# Patient Record
Sex: FEMALE | Race: ASIAN | NOT HISPANIC OR LATINO | Employment: PART TIME | URBAN - METROPOLITAN AREA
[De-identification: names, ages, dates, MRNs, and addresses within clinical notes are randomized per-mention and may not be internally consistent; named-entity substitution may affect disease eponyms.]

---

## 2017-01-20 ENCOUNTER — OFFICE VISIT - HEALTHEAST (OUTPATIENT)
Dept: FAMILY MEDICINE | Facility: CLINIC | Age: 37
End: 2017-01-20

## 2017-01-20 DIAGNOSIS — G47.9 TROUBLE IN SLEEPING: ICD-10-CM

## 2017-01-26 ENCOUNTER — COMMUNICATION - HEALTHEAST (OUTPATIENT)
Dept: FAMILY MEDICINE | Facility: CLINIC | Age: 37
End: 2017-01-26

## 2017-02-13 ENCOUNTER — AMBULATORY - HEALTHEAST (OUTPATIENT)
Dept: FAMILY MEDICINE | Facility: CLINIC | Age: 37
End: 2017-02-13

## 2017-02-22 ENCOUNTER — OFFICE VISIT - HEALTHEAST (OUTPATIENT)
Dept: FAMILY MEDICINE | Facility: CLINIC | Age: 37
End: 2017-02-22

## 2017-02-22 DIAGNOSIS — F32.3 SEVERE MAJOR DEPRESSION WITH PSYCHOTIC FEATURES (H): ICD-10-CM

## 2017-02-22 DIAGNOSIS — B19.10 HEPATITIS B: ICD-10-CM

## 2017-02-22 DIAGNOSIS — Z00.00 ROUTINE GENERAL MEDICAL EXAMINATION AT A HEALTH CARE FACILITY: ICD-10-CM

## 2017-02-22 LAB
AFP SERPL-MCNC: 2.6 UG/ML
CHOLEST SERPL-MCNC: 208 MG/DL
FASTING STATUS PATIENT QL REPORTED: YES
HDLC SERPL-MCNC: 37 MG/DL
LDLC SERPL CALC-MCNC: 144 MG/DL
TRIGL SERPL-MCNC: 137 MG/DL

## 2017-02-22 ASSESSMENT — MIFFLIN-ST. JEOR: SCORE: 1173.45

## 2017-02-23 LAB — HBE ANTIBODY, S - HISTORICAL: NEGATIVE

## 2017-02-24 LAB
HAV IGM SERPL QL IA: NEGATIVE
HBV CORE IGM SERPL QL IA: NEGATIVE
HBV SURFACE AG SERPL QL IA: ABNORMAL
HCV AB SERPL QL IA: NEGATIVE

## 2017-02-27 LAB — HBV DNA DETECT/QUANT, S: DETECTED IU/ML

## 2017-03-14 ENCOUNTER — OFFICE VISIT - HEALTHEAST (OUTPATIENT)
Dept: FAMILY MEDICINE | Facility: CLINIC | Age: 37
End: 2017-03-14

## 2017-03-14 DIAGNOSIS — J06.9 URI (UPPER RESPIRATORY INFECTION): ICD-10-CM

## 2017-03-14 DIAGNOSIS — F32.3 SEVERE MAJOR DEPRESSION WITH PSYCHOTIC FEATURES (H): ICD-10-CM

## 2017-03-14 DIAGNOSIS — E78.5 HYPERLIPIDEMIA: ICD-10-CM

## 2017-03-14 DIAGNOSIS — E55.9 VITAMIN D DEFICIENCY: ICD-10-CM

## 2017-03-14 DIAGNOSIS — B19.10 HEPATITIS B: ICD-10-CM

## 2017-03-16 ENCOUNTER — OFFICE VISIT - HEALTHEAST (OUTPATIENT)
Dept: BEHAVIORAL HEALTH | Facility: CLINIC | Age: 37
End: 2017-03-16

## 2017-03-16 DIAGNOSIS — F43.10 PTSD (POST-TRAUMATIC STRESS DISORDER): ICD-10-CM

## 2017-03-16 DIAGNOSIS — F32.A DEPRESSION, UNSPECIFIED DEPRESSION TYPE: ICD-10-CM

## 2017-03-30 ENCOUNTER — OFFICE VISIT - HEALTHEAST (OUTPATIENT)
Dept: BEHAVIORAL HEALTH | Facility: CLINIC | Age: 37
End: 2017-03-30

## 2017-03-30 DIAGNOSIS — F32.A DEPRESSION, UNSPECIFIED DEPRESSION TYPE: ICD-10-CM

## 2017-04-17 ENCOUNTER — RECORDS - HEALTHEAST (OUTPATIENT)
Dept: ADMINISTRATIVE | Facility: OTHER | Age: 37
End: 2017-04-17

## 2017-04-27 ENCOUNTER — OFFICE VISIT - HEALTHEAST (OUTPATIENT)
Dept: BEHAVIORAL HEALTH | Facility: CLINIC | Age: 37
End: 2017-04-27

## 2017-04-27 DIAGNOSIS — F43.10 PTSD (POST-TRAUMATIC STRESS DISORDER): ICD-10-CM

## 2017-04-27 DIAGNOSIS — F32.A DEPRESSION, UNSPECIFIED DEPRESSION TYPE: ICD-10-CM

## 2017-06-01 ENCOUNTER — OFFICE VISIT - HEALTHEAST (OUTPATIENT)
Dept: BEHAVIORAL HEALTH | Facility: CLINIC | Age: 37
End: 2017-06-01

## 2017-06-01 ENCOUNTER — OFFICE VISIT - HEALTHEAST (OUTPATIENT)
Dept: FAMILY MEDICINE | Facility: CLINIC | Age: 37
End: 2017-06-01

## 2017-06-01 DIAGNOSIS — F43.10 PTSD (POST-TRAUMATIC STRESS DISORDER): ICD-10-CM

## 2017-06-01 DIAGNOSIS — F32.A DEPRESSION, UNSPECIFIED DEPRESSION TYPE: ICD-10-CM

## 2017-06-01 DIAGNOSIS — R68.89 COLD INTOLERANCE: ICD-10-CM

## 2017-06-15 ENCOUNTER — OFFICE VISIT - HEALTHEAST (OUTPATIENT)
Dept: BEHAVIORAL HEALTH | Facility: CLINIC | Age: 37
End: 2017-06-15

## 2017-06-15 DIAGNOSIS — F33.3 SEVERE EPISODE OF RECURRENT MAJOR DEPRESSIVE DISORDER, WITH PSYCHOTIC FEATURES (H): ICD-10-CM

## 2017-06-15 DIAGNOSIS — F43.10 PTSD (POST-TRAUMATIC STRESS DISORDER): ICD-10-CM

## 2017-06-29 ENCOUNTER — OFFICE VISIT - HEALTHEAST (OUTPATIENT)
Dept: BEHAVIORAL HEALTH | Facility: CLINIC | Age: 37
End: 2017-06-29

## 2017-06-29 DIAGNOSIS — F33.3 SEVERE EPISODE OF RECURRENT MAJOR DEPRESSIVE DISORDER, WITH PSYCHOTIC FEATURES (H): ICD-10-CM

## 2017-06-29 DIAGNOSIS — F43.10 PTSD (POST-TRAUMATIC STRESS DISORDER): ICD-10-CM

## 2017-07-20 ENCOUNTER — OFFICE VISIT - HEALTHEAST (OUTPATIENT)
Dept: BEHAVIORAL HEALTH | Facility: CLINIC | Age: 37
End: 2017-07-20

## 2017-07-20 DIAGNOSIS — F43.10 PTSD (POST-TRAUMATIC STRESS DISORDER): ICD-10-CM

## 2017-07-20 DIAGNOSIS — F33.3 SEVERE EPISODE OF RECURRENT MAJOR DEPRESSIVE DISORDER, WITH PSYCHOTIC FEATURES (H): ICD-10-CM

## 2017-08-29 ENCOUNTER — COMMUNICATION - HEALTHEAST (OUTPATIENT)
Dept: FAMILY MEDICINE | Facility: CLINIC | Age: 37
End: 2017-08-29

## 2017-08-29 ENCOUNTER — OFFICE VISIT - HEALTHEAST (OUTPATIENT)
Dept: FAMILY MEDICINE | Facility: CLINIC | Age: 37
End: 2017-08-29

## 2017-08-29 DIAGNOSIS — F32.3 SEVERE MAJOR DEPRESSION WITH PSYCHOTIC FEATURES (H): ICD-10-CM

## 2017-08-29 DIAGNOSIS — B19.10 HEPATITIS B: ICD-10-CM

## 2017-08-29 DIAGNOSIS — N76.0 VAGINITIS: ICD-10-CM

## 2017-08-30 LAB
AFP SERPL-MCNC: 2.5 UG/ML
HBV SURFACE AB SERPL IA-ACNC: NEGATIVE M[IU]/ML
HBV SURFACE AG SERPL QL IA: ABNORMAL

## 2017-08-31 LAB
HBE ANTIBODY, S - HISTORICAL: POSITIVE
HBV DNA DETECT/QUANT, S: 21 IU/ML
HEPATITIS BE AG, S - HISTORICAL: NEGATIVE

## 2017-09-01 LAB — HCV RNA DETECT/QUANT, S - HISTORICAL: NORMAL IU/ML

## 2017-09-28 ENCOUNTER — OFFICE VISIT - HEALTHEAST (OUTPATIENT)
Dept: FAMILY MEDICINE | Facility: CLINIC | Age: 37
End: 2017-09-28

## 2017-09-28 DIAGNOSIS — G47.9 TROUBLE IN SLEEPING: ICD-10-CM

## 2017-09-28 DIAGNOSIS — Z23 NEED FOR PROPHYLACTIC VACCINATION AND INOCULATION AGAINST INFLUENZA: ICD-10-CM

## 2017-09-28 DIAGNOSIS — R10.11 RUQ ABDOMINAL PAIN: ICD-10-CM

## 2017-09-28 DIAGNOSIS — B19.10 HEPATITIS B: ICD-10-CM

## 2017-09-28 DIAGNOSIS — F32.3 SEVERE MAJOR DEPRESSION WITH PSYCHOTIC FEATURES (H): ICD-10-CM

## 2017-10-02 ENCOUNTER — HOSPITAL ENCOUNTER (OUTPATIENT)
Dept: ULTRASOUND IMAGING | Facility: HOSPITAL | Age: 37
Discharge: HOME | End: 2017-10-02
Attending: FAMILY MEDICINE

## 2017-10-02 ENCOUNTER — COMMUNICATION - HEALTHEAST (OUTPATIENT)
Dept: FAMILY MEDICINE | Facility: CLINIC | Age: 37
End: 2017-10-02

## 2017-10-02 DIAGNOSIS — F32.9 MAJOR DEPRESSION: ICD-10-CM

## 2017-10-02 DIAGNOSIS — R10.11 RUQ ABDOMINAL PAIN: ICD-10-CM

## 2017-10-02 DIAGNOSIS — B19.10 HEPATITIS B: ICD-10-CM

## 2017-10-04 ENCOUNTER — COMMUNICATION - HEALTHEAST (OUTPATIENT)
Dept: FAMILY MEDICINE | Facility: CLINIC | Age: 37
End: 2017-10-04

## 2017-10-04 DIAGNOSIS — R74.8 ELEVATED LIPASE: ICD-10-CM

## 2017-10-05 ENCOUNTER — AMBULATORY - HEALTHEAST (OUTPATIENT)
Dept: LAB | Facility: CLINIC | Age: 37
End: 2017-10-05

## 2017-10-05 DIAGNOSIS — R74.8 ELEVATED LIPASE: ICD-10-CM

## 2017-10-06 ENCOUNTER — COMMUNICATION - HEALTHEAST (OUTPATIENT)
Dept: FAMILY MEDICINE | Facility: CLINIC | Age: 37
End: 2017-10-06

## 2017-10-19 ENCOUNTER — OFFICE VISIT - HEALTHEAST (OUTPATIENT)
Dept: FAMILY MEDICINE | Facility: CLINIC | Age: 37
End: 2017-10-19

## 2017-10-19 DIAGNOSIS — R74.8 ELEVATED LIPASE: ICD-10-CM

## 2017-10-19 DIAGNOSIS — R10.11 RUQ ABDOMINAL PAIN: ICD-10-CM

## 2017-10-20 ENCOUNTER — HOSPITAL ENCOUNTER (OUTPATIENT)
Dept: CT IMAGING | Facility: HOSPITAL | Age: 37
Discharge: HOME | End: 2017-10-20
Attending: FAMILY MEDICINE

## 2017-10-20 DIAGNOSIS — R74.8 ELEVATED LIPASE: ICD-10-CM

## 2017-11-01 ENCOUNTER — OFFICE VISIT - HEALTHEAST (OUTPATIENT)
Dept: FAMILY MEDICINE | Facility: CLINIC | Age: 37
End: 2017-11-01

## 2017-11-01 DIAGNOSIS — B19.10 HEPATITIS B: ICD-10-CM

## 2017-11-01 DIAGNOSIS — R74.8 ELEVATED LIPASE: ICD-10-CM

## 2017-11-01 DIAGNOSIS — F32.3 SEVERE MAJOR DEPRESSION WITH PSYCHOTIC FEATURES (H): ICD-10-CM

## 2017-11-07 ENCOUNTER — OFFICE VISIT - HEALTHEAST (OUTPATIENT)
Dept: BEHAVIORAL HEALTH | Facility: CLINIC | Age: 37
End: 2017-11-07

## 2017-11-07 DIAGNOSIS — F43.10 PTSD (POST-TRAUMATIC STRESS DISORDER): ICD-10-CM

## 2017-11-07 DIAGNOSIS — F33.3 SEVERE EPISODE OF RECURRENT MAJOR DEPRESSIVE DISORDER, WITH PSYCHOTIC FEATURES (H): ICD-10-CM

## 2017-11-09 ENCOUNTER — AMBULATORY - HEALTHEAST (OUTPATIENT)
Dept: FAMILY MEDICINE | Facility: CLINIC | Age: 37
End: 2017-11-09

## 2017-11-09 DIAGNOSIS — F39 MOOD DISORDER (H): ICD-10-CM

## 2017-11-14 ENCOUNTER — OFFICE VISIT - HEALTHEAST (OUTPATIENT)
Dept: BEHAVIORAL HEALTH | Facility: CLINIC | Age: 37
End: 2017-11-14

## 2017-11-14 DIAGNOSIS — F33.3 SEVERE EPISODE OF RECURRENT MAJOR DEPRESSIVE DISORDER, WITH PSYCHOTIC FEATURES (H): ICD-10-CM

## 2017-11-14 DIAGNOSIS — F43.10 PTSD (POST-TRAUMATIC STRESS DISORDER): ICD-10-CM

## 2017-11-28 ENCOUNTER — AMBULATORY - HEALTHEAST (OUTPATIENT)
Dept: BEHAVIORAL HEALTH | Facility: CLINIC | Age: 37
End: 2017-11-28

## 2017-11-28 ENCOUNTER — OFFICE VISIT - HEALTHEAST (OUTPATIENT)
Dept: BEHAVIORAL HEALTH | Facility: CLINIC | Age: 37
End: 2017-11-28

## 2017-11-28 DIAGNOSIS — F33.3 SEVERE EPISODE OF RECURRENT MAJOR DEPRESSIVE DISORDER, WITH PSYCHOTIC FEATURES (H): ICD-10-CM

## 2017-11-28 DIAGNOSIS — F43.10 PTSD (POST-TRAUMATIC STRESS DISORDER): ICD-10-CM

## 2017-12-12 ENCOUNTER — OFFICE VISIT - HEALTHEAST (OUTPATIENT)
Dept: BEHAVIORAL HEALTH | Facility: CLINIC | Age: 37
End: 2017-12-12

## 2017-12-12 DIAGNOSIS — F43.10 PTSD (POST-TRAUMATIC STRESS DISORDER): ICD-10-CM

## 2017-12-12 DIAGNOSIS — F33.3 SEVERE EPISODE OF RECURRENT MAJOR DEPRESSIVE DISORDER, WITH PSYCHOTIC FEATURES (H): ICD-10-CM

## 2017-12-14 ENCOUNTER — OFFICE VISIT - HEALTHEAST (OUTPATIENT)
Dept: BEHAVIORAL HEALTH | Facility: CLINIC | Age: 37
End: 2017-12-14

## 2017-12-14 DIAGNOSIS — F33.3 MDD (MAJOR DEPRESSIVE DISORDER), RECURRENT, SEVERE, WITH PSYCHOSIS (H): ICD-10-CM

## 2017-12-14 ASSESSMENT — MIFFLIN-ST. JEOR: SCORE: 1109.94

## 2017-12-26 ENCOUNTER — OFFICE VISIT - HEALTHEAST (OUTPATIENT)
Dept: FAMILY MEDICINE | Facility: CLINIC | Age: 37
End: 2017-12-26

## 2017-12-26 ENCOUNTER — OFFICE VISIT - HEALTHEAST (OUTPATIENT)
Dept: BEHAVIORAL HEALTH | Facility: CLINIC | Age: 37
End: 2017-12-26

## 2017-12-26 DIAGNOSIS — F43.10 PTSD (POST-TRAUMATIC STRESS DISORDER): ICD-10-CM

## 2017-12-26 DIAGNOSIS — N76.0 VAGINITIS: ICD-10-CM

## 2017-12-26 DIAGNOSIS — F33.3 SEVERE EPISODE OF RECURRENT MAJOR DEPRESSIVE DISORDER, WITH PSYCHOTIC FEATURES (H): ICD-10-CM

## 2018-01-16 ENCOUNTER — OFFICE VISIT - HEALTHEAST (OUTPATIENT)
Dept: BEHAVIORAL HEALTH | Facility: CLINIC | Age: 38
End: 2018-01-16

## 2018-01-16 DIAGNOSIS — F33.3 SEVERE EPISODE OF RECURRENT MAJOR DEPRESSIVE DISORDER, WITH PSYCHOTIC FEATURES (H): ICD-10-CM

## 2018-01-16 DIAGNOSIS — F43.10 PTSD (POST-TRAUMATIC STRESS DISORDER): ICD-10-CM

## 2018-01-19 ENCOUNTER — COMMUNICATION - HEALTHEAST (OUTPATIENT)
Dept: BEHAVIORAL HEALTH | Facility: CLINIC | Age: 38
End: 2018-01-19

## 2018-01-24 ENCOUNTER — OFFICE VISIT - HEALTHEAST (OUTPATIENT)
Dept: BEHAVIORAL HEALTH | Facility: CLINIC | Age: 38
End: 2018-01-24

## 2018-01-24 DIAGNOSIS — F33.3 MDD (MAJOR DEPRESSIVE DISORDER), RECURRENT, SEVERE, WITH PSYCHOSIS (H): ICD-10-CM

## 2018-01-24 DIAGNOSIS — F32.9 MAJOR DEPRESSION: ICD-10-CM

## 2018-01-24 ASSESSMENT — MIFFLIN-ST. JEOR: SCORE: 1123.55

## 2018-01-29 ENCOUNTER — OFFICE VISIT - HEALTHEAST (OUTPATIENT)
Dept: BEHAVIORAL HEALTH | Facility: CLINIC | Age: 38
End: 2018-01-29

## 2018-01-29 DIAGNOSIS — F33.3 SEVERE EPISODE OF RECURRENT MAJOR DEPRESSIVE DISORDER, WITH PSYCHOTIC FEATURES (H): ICD-10-CM

## 2018-01-29 DIAGNOSIS — F43.10 PTSD (POST-TRAUMATIC STRESS DISORDER): ICD-10-CM

## 2018-02-12 ENCOUNTER — OFFICE VISIT - HEALTHEAST (OUTPATIENT)
Dept: BEHAVIORAL HEALTH | Facility: CLINIC | Age: 38
End: 2018-02-12

## 2018-02-12 DIAGNOSIS — F33.3 SEVERE EPISODE OF RECURRENT MAJOR DEPRESSIVE DISORDER, WITH PSYCHOTIC FEATURES (H): ICD-10-CM

## 2018-02-12 DIAGNOSIS — F43.10 PTSD (POST-TRAUMATIC STRESS DISORDER): ICD-10-CM

## 2018-02-26 ENCOUNTER — AMBULATORY - HEALTHEAST (OUTPATIENT)
Dept: BEHAVIORAL HEALTH | Facility: CLINIC | Age: 38
End: 2018-02-26

## 2018-02-26 ENCOUNTER — OFFICE VISIT - HEALTHEAST (OUTPATIENT)
Dept: BEHAVIORAL HEALTH | Facility: CLINIC | Age: 38
End: 2018-02-26

## 2018-02-26 DIAGNOSIS — F33.3 SEVERE EPISODE OF RECURRENT MAJOR DEPRESSIVE DISORDER, WITH PSYCHOTIC FEATURES (H): ICD-10-CM

## 2018-02-26 DIAGNOSIS — F43.10 PTSD (POST-TRAUMATIC STRESS DISORDER): ICD-10-CM

## 2018-02-26 DIAGNOSIS — F45.1 SOMATIC SYMPTOM DISORDER: ICD-10-CM

## 2018-03-06 ENCOUNTER — AMBULATORY - HEALTHEAST (OUTPATIENT)
Dept: BEHAVIORAL HEALTH | Facility: CLINIC | Age: 38
End: 2018-03-06

## 2018-03-08 ENCOUNTER — OFFICE VISIT - HEALTHEAST (OUTPATIENT)
Dept: BEHAVIORAL HEALTH | Facility: CLINIC | Age: 38
End: 2018-03-08

## 2018-03-08 DIAGNOSIS — F33.3 MDD (MAJOR DEPRESSIVE DISORDER), RECURRENT, SEVERE, WITH PSYCHOSIS (H): ICD-10-CM

## 2018-03-08 ASSESSMENT — MIFFLIN-ST. JEOR: SCORE: 1132.62

## 2018-03-12 ENCOUNTER — OFFICE VISIT - HEALTHEAST (OUTPATIENT)
Dept: BEHAVIORAL HEALTH | Facility: CLINIC | Age: 38
End: 2018-03-12

## 2018-03-12 DIAGNOSIS — F33.3 SEVERE EPISODE OF RECURRENT MAJOR DEPRESSIVE DISORDER, WITH PSYCHOTIC FEATURES (H): ICD-10-CM

## 2018-03-12 DIAGNOSIS — F43.10 PTSD (POST-TRAUMATIC STRESS DISORDER): ICD-10-CM

## 2018-03-26 ENCOUNTER — OFFICE VISIT - HEALTHEAST (OUTPATIENT)
Dept: BEHAVIORAL HEALTH | Facility: CLINIC | Age: 38
End: 2018-03-26

## 2018-03-26 DIAGNOSIS — F33.3 SEVERE EPISODE OF RECURRENT MAJOR DEPRESSIVE DISORDER, WITH PSYCHOTIC FEATURES (H): ICD-10-CM

## 2018-03-26 DIAGNOSIS — F43.10 PTSD (POST-TRAUMATIC STRESS DISORDER): ICD-10-CM

## 2018-04-09 ENCOUNTER — OFFICE VISIT - HEALTHEAST (OUTPATIENT)
Dept: BEHAVIORAL HEALTH | Facility: CLINIC | Age: 38
End: 2018-04-09

## 2018-04-09 DIAGNOSIS — F33.3 SEVERE EPISODE OF RECURRENT MAJOR DEPRESSIVE DISORDER, WITH PSYCHOTIC FEATURES (H): ICD-10-CM

## 2018-04-09 DIAGNOSIS — F43.10 PTSD (POST-TRAUMATIC STRESS DISORDER): ICD-10-CM

## 2018-04-23 ENCOUNTER — OFFICE VISIT - HEALTHEAST (OUTPATIENT)
Dept: BEHAVIORAL HEALTH | Facility: CLINIC | Age: 38
End: 2018-04-23

## 2018-04-23 DIAGNOSIS — F43.10 PTSD (POST-TRAUMATIC STRESS DISORDER): ICD-10-CM

## 2018-04-23 DIAGNOSIS — F33.3 SEVERE EPISODE OF RECURRENT MAJOR DEPRESSIVE DISORDER, WITH PSYCHOTIC FEATURES (H): ICD-10-CM

## 2018-05-07 ENCOUNTER — OFFICE VISIT - HEALTHEAST (OUTPATIENT)
Dept: BEHAVIORAL HEALTH | Facility: CLINIC | Age: 38
End: 2018-05-07

## 2018-05-07 ENCOUNTER — AMBULATORY - HEALTHEAST (OUTPATIENT)
Dept: BEHAVIORAL HEALTH | Facility: CLINIC | Age: 38
End: 2018-05-07

## 2018-05-07 DIAGNOSIS — F43.10 PTSD (POST-TRAUMATIC STRESS DISORDER): ICD-10-CM

## 2018-05-07 DIAGNOSIS — F33.3 SEVERE EPISODE OF RECURRENT MAJOR DEPRESSIVE DISORDER, WITH PSYCHOTIC FEATURES (H): ICD-10-CM

## 2018-05-29 ENCOUNTER — OFFICE VISIT - HEALTHEAST (OUTPATIENT)
Dept: FAMILY MEDICINE | Facility: CLINIC | Age: 38
End: 2018-05-29

## 2018-05-29 DIAGNOSIS — R10.84 GENERALIZED ABDOMINAL PAIN: ICD-10-CM

## 2018-05-29 DIAGNOSIS — R50.9 FEVER: ICD-10-CM

## 2018-05-29 DIAGNOSIS — N39.0 UTI (URINARY TRACT INFECTION): ICD-10-CM

## 2018-05-29 LAB
ALBUMIN UR-MCNC: ABNORMAL MG/DL
APPEARANCE UR: CLEAR
BACTERIA #/AREA URNS HPF: ABNORMAL HPF
BASOPHILS # BLD AUTO: 0.1 THOU/UL (ref 0–0.2)
BASOPHILS NFR BLD AUTO: 1 % (ref 0–2)
BILIRUB UR QL STRIP: ABNORMAL
COLOR UR AUTO: YELLOW
EOSINOPHIL # BLD AUTO: 0.1 THOU/UL (ref 0–0.4)
EOSINOPHIL NFR BLD AUTO: 1 % (ref 0–6)
ERYTHROCYTE [DISTWIDTH] IN BLOOD BY AUTOMATED COUNT: 13.5 % (ref 11–14.5)
GLUCOSE UR STRIP-MCNC: NEGATIVE MG/DL
HCG UR QL: NEGATIVE
HCT VFR BLD AUTO: 38.8 % (ref 35–47)
HGB BLD-MCNC: 12.4 G/DL (ref 12–16)
HGB UR QL STRIP: ABNORMAL
KETONES UR STRIP-MCNC: ABNORMAL MG/DL
LEUKOCYTE ESTERASE UR QL STRIP: ABNORMAL
LYMPHOCYTES # BLD AUTO: 1.8 THOU/UL (ref 0.8–4.4)
LYMPHOCYTES NFR BLD AUTO: 17 % (ref 20–40)
MCH RBC QN AUTO: 28.9 PG (ref 27–34)
MCHC RBC AUTO-ENTMCNC: 31.9 G/DL (ref 32–36)
MCV RBC AUTO: 91 FL (ref 80–100)
MONOCYTES # BLD AUTO: 1.2 THOU/UL (ref 0–0.9)
MONOCYTES NFR BLD AUTO: 12 % (ref 2–10)
MUCOUS THREADS #/AREA URNS LPF: ABNORMAL LPF
NEUTROPHILS # BLD AUTO: 7.1 THOU/UL (ref 2–7.7)
NEUTROPHILS NFR BLD AUTO: 69 % (ref 50–70)
NITRATE UR QL: NEGATIVE
PH UR STRIP: 6 [PH] (ref 5–8)
PLATELET # BLD AUTO: 174 THOU/UL (ref 140–440)
PMV BLD AUTO: 9.6 FL (ref 7–10)
RBC # BLD AUTO: 4.28 MILL/UL (ref 3.8–5.4)
RBC #/AREA URNS AUTO: ABNORMAL HPF
SP GR UR STRIP: 1.02 (ref 1–1.03)
SP GR UR STRIP: 1.02 (ref 1–1.03)
SQUAMOUS #/AREA URNS AUTO: ABNORMAL LPF
UROBILINOGEN UR STRIP-ACNC: ABNORMAL
WBC #/AREA URNS AUTO: ABNORMAL HPF
WBC: 10.2 THOU/UL (ref 4–11)

## 2018-05-30 LAB — BACTERIA SPEC CULT: NORMAL

## 2018-06-04 ENCOUNTER — RECORDS - HEALTHEAST (OUTPATIENT)
Dept: ADMINISTRATIVE | Facility: OTHER | Age: 38
End: 2018-06-04

## 2018-07-11 ENCOUNTER — OFFICE VISIT - HEALTHEAST (OUTPATIENT)
Dept: FAMILY MEDICINE | Facility: CLINIC | Age: 38
End: 2018-07-11

## 2018-07-11 DIAGNOSIS — N76.1 CHRONIC VAGINITIS: ICD-10-CM

## 2018-07-11 DIAGNOSIS — R30.0 BURNING WITH URINATION: ICD-10-CM

## 2018-07-11 LAB
ALBUMIN UR-MCNC: NEGATIVE MG/DL
APPEARANCE UR: CLEAR
BILIRUB UR QL STRIP: ABNORMAL
CLUE CELLS: NORMAL
COLOR UR AUTO: YELLOW
GLUCOSE UR STRIP-MCNC: NEGATIVE MG/DL
HBA1C MFR BLD: 5.3 % (ref 3.5–6)
HGB UR QL STRIP: NEGATIVE
KETONES UR STRIP-MCNC: ABNORMAL MG/DL
LEUKOCYTE ESTERASE UR QL STRIP: NEGATIVE
NITRATE UR QL: NEGATIVE
PH UR STRIP: 6 [PH] (ref 5–8)
SP GR UR STRIP: 1.02 (ref 1–1.03)
TRICHOMONAS, WET PREP: NORMAL
UROBILINOGEN UR STRIP-ACNC: ABNORMAL
YEAST, WET PREP: NORMAL

## 2018-08-07 ENCOUNTER — OFFICE VISIT - HEALTHEAST (OUTPATIENT)
Dept: FAMILY MEDICINE | Facility: CLINIC | Age: 38
End: 2018-08-07

## 2018-08-07 DIAGNOSIS — F32.9 MAJOR DEPRESSION: ICD-10-CM

## 2018-08-07 DIAGNOSIS — E55.9 VITAMIN D DEFICIENCY: ICD-10-CM

## 2018-08-07 DIAGNOSIS — R30.0 DYSURIA: ICD-10-CM

## 2018-08-07 DIAGNOSIS — F33.3 MDD (MAJOR DEPRESSIVE DISORDER), RECURRENT, SEVERE, WITH PSYCHOSIS (H): ICD-10-CM

## 2018-08-07 DIAGNOSIS — L29.3 PERINEAL ITCHING, FEMALE: ICD-10-CM

## 2018-08-07 LAB
ALBUMIN UR-MCNC: NEGATIVE MG/DL
APPEARANCE UR: CLEAR
BILIRUB UR QL STRIP: NEGATIVE
CLUE CELLS: NORMAL
COLOR UR AUTO: YELLOW
GLUCOSE UR STRIP-MCNC: NEGATIVE MG/DL
HGB UR QL STRIP: NEGATIVE
KETONES UR STRIP-MCNC: NEGATIVE MG/DL
LEUKOCYTE ESTERASE UR QL STRIP: NEGATIVE
NITRATE UR QL: NEGATIVE
PH UR STRIP: 5.5 [PH] (ref 5–8)
SP GR UR STRIP: >=1.03 (ref 1–1.03)
TRICHOMONAS, WET PREP: NORMAL
UROBILINOGEN UR STRIP-ACNC: NORMAL
YEAST, WET PREP: NORMAL

## 2018-08-08 ENCOUNTER — COMMUNICATION - HEALTHEAST (OUTPATIENT)
Dept: FAMILY MEDICINE | Facility: CLINIC | Age: 38
End: 2018-08-08

## 2018-08-08 LAB
BACTERIA SPEC CULT: NORMAL
C TRACH DNA SPEC QL PROBE+SIG AMP: NEGATIVE
N GONORRHOEA DNA SPEC QL NAA+PROBE: NEGATIVE

## 2018-12-04 ENCOUNTER — RECORDS - HEALTHEAST (OUTPATIENT)
Dept: ADMINISTRATIVE | Facility: OTHER | Age: 38
End: 2018-12-04

## 2018-12-07 ENCOUNTER — RECORDS - HEALTHEAST (OUTPATIENT)
Dept: ADMINISTRATIVE | Facility: OTHER | Age: 38
End: 2018-12-07

## 2018-12-28 ENCOUNTER — OFFICE VISIT - HEALTHEAST (OUTPATIENT)
Dept: FAMILY MEDICINE | Facility: CLINIC | Age: 38
End: 2018-12-28

## 2018-12-28 DIAGNOSIS — E55.9 VITAMIN D DEFICIENCY: ICD-10-CM

## 2018-12-28 DIAGNOSIS — Z23 NEED FOR IMMUNIZATION AGAINST INFLUENZA: ICD-10-CM

## 2018-12-28 DIAGNOSIS — J02.9 PHARYNGITIS: ICD-10-CM

## 2018-12-28 DIAGNOSIS — J02.0 STREP THROAT: ICD-10-CM

## 2018-12-28 LAB — DEPRECATED S PYO AG THROAT QL EIA: ABNORMAL

## 2019-02-08 ENCOUNTER — OFFICE VISIT - HEALTHEAST (OUTPATIENT)
Dept: FAMILY MEDICINE | Facility: CLINIC | Age: 39
End: 2019-02-08

## 2019-02-08 ENCOUNTER — HOSPITAL ENCOUNTER (OUTPATIENT)
Dept: LAB | Age: 39
Setting detail: SPECIMEN
Discharge: HOME | End: 2019-02-08

## 2019-02-08 DIAGNOSIS — J02.9 SORE THROAT: ICD-10-CM

## 2019-02-08 DIAGNOSIS — L29.9 ITCHING: ICD-10-CM

## 2019-02-08 LAB — DEPRECATED S PYO AG THROAT QL EIA: NORMAL

## 2019-02-08 ASSESSMENT — MIFFLIN-ST. JEOR: SCORE: 1159.84

## 2019-02-09 ENCOUNTER — COMMUNICATION - HEALTHEAST (OUTPATIENT)
Dept: SCHEDULING | Facility: CLINIC | Age: 39
End: 2019-02-09

## 2019-02-09 DIAGNOSIS — J02.0 STREP THROAT: ICD-10-CM

## 2019-02-09 LAB — GROUP A STREP BY PCR: ABNORMAL

## 2019-02-11 ENCOUNTER — AMBULATORY - HEALTHEAST (OUTPATIENT)
Dept: FAMILY MEDICINE | Facility: CLINIC | Age: 39
End: 2019-02-11

## 2019-04-19 ENCOUNTER — OFFICE VISIT - HEALTHEAST (OUTPATIENT)
Dept: FAMILY MEDICINE | Facility: CLINIC | Age: 39
End: 2019-04-19

## 2019-04-19 DIAGNOSIS — N89.8 VAGINAL ITCHING: ICD-10-CM

## 2019-04-19 DIAGNOSIS — H10.13 ALLERGIC CONJUNCTIVITIS OF BOTH EYES: ICD-10-CM

## 2019-04-19 LAB
CLUE CELLS: NORMAL
TRICHOMONAS, WET PREP: NORMAL
YEAST, WET PREP: NORMAL

## 2019-04-22 ENCOUNTER — COMMUNICATION - HEALTHEAST (OUTPATIENT)
Dept: FAMILY MEDICINE | Facility: CLINIC | Age: 39
End: 2019-04-22

## 2019-04-22 LAB
C TRACH DNA SPEC QL PROBE+SIG AMP: NEGATIVE
N GONORRHOEA DNA SPEC QL NAA+PROBE: NEGATIVE

## 2019-04-26 ENCOUNTER — OFFICE VISIT - HEALTHEAST (OUTPATIENT)
Dept: FAMILY MEDICINE | Facility: CLINIC | Age: 39
End: 2019-04-26

## 2019-04-26 DIAGNOSIS — R10.9 STOMACH PAIN: ICD-10-CM

## 2019-04-26 DIAGNOSIS — F43.10 PTSD (POST-TRAUMATIC STRESS DISORDER): ICD-10-CM

## 2019-04-26 DIAGNOSIS — E55.9 VITAMIN D DEFICIENCY: ICD-10-CM

## 2019-04-26 DIAGNOSIS — F33.3 SEVERE EPISODE OF RECURRENT MAJOR DEPRESSIVE DISORDER, WITH PSYCHOTIC FEATURES (H): ICD-10-CM

## 2019-04-26 DIAGNOSIS — R53.83 FATIGUE, UNSPECIFIED TYPE: ICD-10-CM

## 2019-04-26 DIAGNOSIS — H57.9 ITCHY EYES: ICD-10-CM

## 2019-04-26 DIAGNOSIS — Z00.00 ANNUAL PHYSICAL EXAM: ICD-10-CM

## 2019-04-26 DIAGNOSIS — B18.1 CHRONIC VIRAL HEPATITIS B WITHOUT DELTA AGENT AND WITHOUT COMA (H): ICD-10-CM

## 2019-04-26 LAB
CHOLEST SERPL-MCNC: 181 MG/DL
FASTING STATUS PATIENT QL REPORTED: YES
FASTING STATUS PATIENT QL REPORTED: YES
GLUCOSE BLD-MCNC: 85 MG/DL (ref 70–99)
HDLC SERPL-MCNC: 47 MG/DL
HGB BLD-MCNC: 12.6 G/DL (ref 12–16)
LDLC SERPL CALC-MCNC: 113 MG/DL
TRIGL SERPL-MCNC: 105 MG/DL
TSH SERPL DL<=0.005 MIU/L-ACNC: 0.85 UIU/ML (ref 0.3–5)

## 2019-04-26 ASSESSMENT — MIFFLIN-ST. JEOR: SCORE: 1128.74

## 2019-04-28 ENCOUNTER — AMBULATORY - HEALTHEAST (OUTPATIENT)
Dept: FAMILY MEDICINE | Facility: CLINIC | Age: 39
End: 2019-04-28

## 2019-04-28 DIAGNOSIS — H57.9 ITCHY EYES: ICD-10-CM

## 2019-04-29 ENCOUNTER — COMMUNICATION - HEALTHEAST (OUTPATIENT)
Dept: FAMILY MEDICINE | Facility: CLINIC | Age: 39
End: 2019-04-29

## 2019-04-29 LAB — 25(OH)D3 SERPL-MCNC: 25 NG/ML (ref 30–80)

## 2019-05-06 ENCOUNTER — AMBULATORY - HEALTHEAST (OUTPATIENT)
Dept: FAMILY MEDICINE | Facility: CLINIC | Age: 39
End: 2019-05-06

## 2019-05-06 DIAGNOSIS — E55.9 VITAMIN D DEFICIENCY: ICD-10-CM

## 2019-05-17 ENCOUNTER — OFFICE VISIT - HEALTHEAST (OUTPATIENT)
Dept: BEHAVIORAL HEALTH | Facility: CLINIC | Age: 39
End: 2019-05-17

## 2019-05-17 DIAGNOSIS — F43.10 PTSD (POST-TRAUMATIC STRESS DISORDER): ICD-10-CM

## 2019-05-17 DIAGNOSIS — F34.1 PERSISTENT DEPRESSIVE DISORDER WITH ANXIOUS DISTRESS, CURRENTLY MODERATE: ICD-10-CM

## 2019-06-03 ENCOUNTER — AMBULATORY - HEALTHEAST (OUTPATIENT)
Dept: BEHAVIORAL HEALTH | Facility: CLINIC | Age: 39
End: 2019-06-03

## 2019-06-03 ENCOUNTER — OFFICE VISIT - HEALTHEAST (OUTPATIENT)
Dept: BEHAVIORAL HEALTH | Facility: CLINIC | Age: 39
End: 2019-06-03

## 2019-06-03 DIAGNOSIS — F43.10 PTSD (POST-TRAUMATIC STRESS DISORDER): ICD-10-CM

## 2019-06-03 DIAGNOSIS — F34.1 PERSISTENT DEPRESSIVE DISORDER WITH ANXIOUS DISTRESS, CURRENTLY MODERATE: ICD-10-CM

## 2019-06-17 ENCOUNTER — AMBULATORY - HEALTHEAST (OUTPATIENT)
Dept: BEHAVIORAL HEALTH | Facility: CLINIC | Age: 39
End: 2019-06-17

## 2019-06-17 ENCOUNTER — AMBULATORY - HEALTHEAST (OUTPATIENT)
Dept: CARE COORDINATION | Facility: CLINIC | Age: 39
End: 2019-06-17

## 2019-06-17 ENCOUNTER — OFFICE VISIT - HEALTHEAST (OUTPATIENT)
Dept: BEHAVIORAL HEALTH | Facility: CLINIC | Age: 39
End: 2019-06-17

## 2019-06-17 DIAGNOSIS — F34.1 PERSISTENT DEPRESSIVE DISORDER WITH ANXIOUS DISTRESS, CURRENTLY MODERATE: ICD-10-CM

## 2019-06-17 DIAGNOSIS — F43.10 PTSD (POST-TRAUMATIC STRESS DISORDER): ICD-10-CM

## 2019-06-17 DIAGNOSIS — F33.3 SEVERE EPISODE OF RECURRENT MAJOR DEPRESSIVE DISORDER, WITH PSYCHOTIC FEATURES (H): ICD-10-CM

## 2019-06-17 DIAGNOSIS — G47.9 TROUBLE IN SLEEPING: ICD-10-CM

## 2019-06-19 ENCOUNTER — COMMUNICATION - HEALTHEAST (OUTPATIENT)
Dept: NURSING | Facility: CLINIC | Age: 39
End: 2019-06-19

## 2019-06-19 ENCOUNTER — OFFICE VISIT - HEALTHEAST (OUTPATIENT)
Dept: FAMILY MEDICINE | Facility: CLINIC | Age: 39
End: 2019-06-19

## 2019-06-19 DIAGNOSIS — G47.9 TROUBLE IN SLEEPING: ICD-10-CM

## 2019-06-28 ENCOUNTER — COMMUNICATION - HEALTHEAST (OUTPATIENT)
Dept: NURSING | Facility: CLINIC | Age: 39
End: 2019-06-28

## 2019-07-01 ENCOUNTER — OFFICE VISIT - HEALTHEAST (OUTPATIENT)
Dept: BEHAVIORAL HEALTH | Facility: CLINIC | Age: 39
End: 2019-07-01

## 2019-07-01 DIAGNOSIS — F34.1 PERSISTENT DEPRESSIVE DISORDER WITH ANXIOUS DISTRESS, CURRENTLY MODERATE: ICD-10-CM

## 2019-07-01 DIAGNOSIS — F43.10 PTSD (POST-TRAUMATIC STRESS DISORDER): ICD-10-CM

## 2019-07-03 ENCOUNTER — COMMUNICATION - HEALTHEAST (OUTPATIENT)
Dept: NURSING | Facility: CLINIC | Age: 39
End: 2019-07-03

## 2019-07-03 ENCOUNTER — AMBULATORY - HEALTHEAST (OUTPATIENT)
Dept: NURSING | Facility: CLINIC | Age: 39
End: 2019-07-03

## 2019-07-08 ENCOUNTER — COMMUNICATION - HEALTHEAST (OUTPATIENT)
Dept: CARE COORDINATION | Facility: CLINIC | Age: 39
End: 2019-07-08

## 2019-07-10 ENCOUNTER — COMMUNICATION - HEALTHEAST (OUTPATIENT)
Dept: NURSING | Facility: CLINIC | Age: 39
End: 2019-07-10

## 2019-07-15 ENCOUNTER — COMMUNICATION - HEALTHEAST (OUTPATIENT)
Dept: NURSING | Facility: CLINIC | Age: 39
End: 2019-07-15

## 2019-07-17 ENCOUNTER — OFFICE VISIT - HEALTHEAST (OUTPATIENT)
Dept: BEHAVIORAL HEALTH | Facility: CLINIC | Age: 39
End: 2019-07-17

## 2019-07-17 ENCOUNTER — AMBULATORY - HEALTHEAST (OUTPATIENT)
Dept: NURSING | Facility: CLINIC | Age: 39
End: 2019-07-17

## 2019-07-17 DIAGNOSIS — F43.10 PTSD (POST-TRAUMATIC STRESS DISORDER): ICD-10-CM

## 2019-07-17 DIAGNOSIS — F34.1 PERSISTENT DEPRESSIVE DISORDER WITH ANXIOUS DISTRESS, CURRENTLY MODERATE: ICD-10-CM

## 2019-07-29 ENCOUNTER — COMMUNICATION - HEALTHEAST (OUTPATIENT)
Dept: NURSING | Facility: CLINIC | Age: 39
End: 2019-07-29

## 2019-07-31 ENCOUNTER — OFFICE VISIT - HEALTHEAST (OUTPATIENT)
Dept: BEHAVIORAL HEALTH | Facility: CLINIC | Age: 39
End: 2019-07-31

## 2019-07-31 DIAGNOSIS — F43.10 PTSD (POST-TRAUMATIC STRESS DISORDER): ICD-10-CM

## 2019-07-31 DIAGNOSIS — F34.1 PERSISTENT DEPRESSIVE DISORDER WITH ANXIOUS DISTRESS, CURRENTLY MODERATE: ICD-10-CM

## 2019-08-01 ENCOUNTER — COMMUNICATION - HEALTHEAST (OUTPATIENT)
Dept: FAMILY MEDICINE | Facility: CLINIC | Age: 39
End: 2019-08-01

## 2019-08-09 ENCOUNTER — OFFICE VISIT - HEALTHEAST (OUTPATIENT)
Dept: FAMILY MEDICINE | Facility: CLINIC | Age: 39
End: 2019-08-09

## 2019-08-09 DIAGNOSIS — M25.511 CHRONIC PAIN OF BOTH SHOULDERS: ICD-10-CM

## 2019-08-09 DIAGNOSIS — G47.9 TROUBLE IN SLEEPING: ICD-10-CM

## 2019-08-09 DIAGNOSIS — M25.512 CHRONIC PAIN OF BOTH SHOULDERS: ICD-10-CM

## 2019-08-09 DIAGNOSIS — G89.29 CHRONIC PAIN OF BOTH SHOULDERS: ICD-10-CM

## 2019-08-09 ASSESSMENT — MIFFLIN-ST. JEOR: SCORE: 1148.07

## 2019-08-13 ENCOUNTER — OFFICE VISIT - HEALTHEAST (OUTPATIENT)
Dept: PHYSICAL THERAPY | Facility: REHABILITATION | Age: 39
End: 2019-08-13

## 2019-08-13 DIAGNOSIS — G89.29 CHRONIC UPPER BACK PAIN: ICD-10-CM

## 2019-08-13 DIAGNOSIS — M54.9 CHRONIC UPPER BACK PAIN: ICD-10-CM

## 2019-08-13 DIAGNOSIS — M62.81 GENERALIZED MUSCLE WEAKNESS: ICD-10-CM

## 2019-08-13 DIAGNOSIS — R29.3 POOR POSTURE: ICD-10-CM

## 2019-08-13 DIAGNOSIS — M25.529 ARTHRALGIA OF UPPER ARM, UNSPECIFIED LATERALITY: ICD-10-CM

## 2019-08-14 ENCOUNTER — COMMUNICATION - HEALTHEAST (OUTPATIENT)
Dept: BEHAVIORAL HEALTH | Facility: CLINIC | Age: 39
End: 2019-08-14

## 2019-08-14 ENCOUNTER — OFFICE VISIT - HEALTHEAST (OUTPATIENT)
Dept: BEHAVIORAL HEALTH | Facility: CLINIC | Age: 39
End: 2019-08-14

## 2019-08-14 DIAGNOSIS — F34.1 PERSISTENT DEPRESSIVE DISORDER WITH ANXIOUS DISTRESS, CURRENTLY MODERATE: ICD-10-CM

## 2019-08-14 DIAGNOSIS — G47.00 INSOMNIA, UNSPECIFIED TYPE: ICD-10-CM

## 2019-08-14 DIAGNOSIS — F43.10 PTSD (POST-TRAUMATIC STRESS DISORDER): ICD-10-CM

## 2019-08-20 ENCOUNTER — OFFICE VISIT - HEALTHEAST (OUTPATIENT)
Dept: PHYSICAL THERAPY | Facility: REHABILITATION | Age: 39
End: 2019-08-20

## 2019-08-20 DIAGNOSIS — M25.529 ARTHRALGIA OF UPPER ARM, UNSPECIFIED LATERALITY: ICD-10-CM

## 2019-08-20 DIAGNOSIS — R29.3 POOR POSTURE: ICD-10-CM

## 2019-08-20 DIAGNOSIS — G89.29 CHRONIC UPPER BACK PAIN: ICD-10-CM

## 2019-08-20 DIAGNOSIS — M62.81 GENERALIZED MUSCLE WEAKNESS: ICD-10-CM

## 2019-08-20 DIAGNOSIS — M54.9 CHRONIC UPPER BACK PAIN: ICD-10-CM

## 2019-08-28 ENCOUNTER — COMMUNICATION - HEALTHEAST (OUTPATIENT)
Dept: NURSING | Facility: CLINIC | Age: 39
End: 2019-08-28

## 2019-08-29 ENCOUNTER — OFFICE VISIT - HEALTHEAST (OUTPATIENT)
Dept: BEHAVIORAL HEALTH | Facility: CLINIC | Age: 39
End: 2019-08-29

## 2019-08-29 ENCOUNTER — AMBULATORY - HEALTHEAST (OUTPATIENT)
Dept: BEHAVIORAL HEALTH | Facility: CLINIC | Age: 39
End: 2019-08-29

## 2019-08-29 DIAGNOSIS — F43.10 PTSD (POST-TRAUMATIC STRESS DISORDER): ICD-10-CM

## 2019-08-29 DIAGNOSIS — F34.1 PERSISTENT DEPRESSIVE DISORDER WITH ANXIOUS DISTRESS, CURRENTLY MODERATE: ICD-10-CM

## 2019-09-04 ENCOUNTER — COMMUNICATION - HEALTHEAST (OUTPATIENT)
Dept: CARE COORDINATION | Facility: CLINIC | Age: 39
End: 2019-09-04

## 2019-09-11 ENCOUNTER — COMMUNICATION - HEALTHEAST (OUTPATIENT)
Dept: NURSING | Facility: CLINIC | Age: 39
End: 2019-09-11

## 2019-09-13 ENCOUNTER — OFFICE VISIT - HEALTHEAST (OUTPATIENT)
Dept: BEHAVIORAL HEALTH | Facility: CLINIC | Age: 39
End: 2019-09-13

## 2019-09-13 DIAGNOSIS — G47.00 INSOMNIA, UNSPECIFIED TYPE: ICD-10-CM

## 2019-09-13 DIAGNOSIS — F43.10 PTSD (POST-TRAUMATIC STRESS DISORDER): ICD-10-CM

## 2019-09-13 DIAGNOSIS — F34.1 PERSISTENT DEPRESSIVE DISORDER WITH ANXIOUS DISTRESS, CURRENTLY MODERATE: ICD-10-CM

## 2019-09-27 ENCOUNTER — OFFICE VISIT - HEALTHEAST (OUTPATIENT)
Dept: BEHAVIORAL HEALTH | Facility: CLINIC | Age: 39
End: 2019-09-27

## 2019-09-27 ENCOUNTER — AMBULATORY - HEALTHEAST (OUTPATIENT)
Dept: NURSING | Facility: CLINIC | Age: 39
End: 2019-09-27

## 2019-09-27 DIAGNOSIS — F34.1 PERSISTENT DEPRESSIVE DISORDER WITH ANXIOUS DISTRESS, CURRENTLY MODERATE: ICD-10-CM

## 2019-09-27 DIAGNOSIS — F43.10 PTSD (POST-TRAUMATIC STRESS DISORDER): ICD-10-CM

## 2019-09-30 ENCOUNTER — COMMUNICATION - HEALTHEAST (OUTPATIENT)
Dept: FAMILY MEDICINE | Facility: CLINIC | Age: 39
End: 2019-09-30

## 2019-10-07 ENCOUNTER — COMMUNICATION - HEALTHEAST (OUTPATIENT)
Dept: SCHEDULING | Facility: CLINIC | Age: 39
End: 2019-10-07

## 2019-10-18 ENCOUNTER — OFFICE VISIT - HEALTHEAST (OUTPATIENT)
Dept: BEHAVIORAL HEALTH | Facility: CLINIC | Age: 39
End: 2019-10-18

## 2019-10-18 DIAGNOSIS — F43.10 PTSD (POST-TRAUMATIC STRESS DISORDER): ICD-10-CM

## 2019-10-18 DIAGNOSIS — F34.1 PERSISTENT DEPRESSIVE DISORDER WITH ANXIOUS DISTRESS, CURRENTLY MODERATE: ICD-10-CM

## 2019-11-05 ENCOUNTER — OFFICE VISIT - HEALTHEAST (OUTPATIENT)
Dept: BEHAVIORAL HEALTH | Facility: CLINIC | Age: 39
End: 2019-11-05

## 2019-11-05 DIAGNOSIS — F43.10 PTSD (POST-TRAUMATIC STRESS DISORDER): ICD-10-CM

## 2019-11-05 DIAGNOSIS — F34.1 PERSISTENT DEPRESSIVE DISORDER WITH ANXIOUS DISTRESS, CURRENTLY MODERATE: ICD-10-CM

## 2019-11-13 ENCOUNTER — COMMUNICATION - HEALTHEAST (OUTPATIENT)
Dept: NURSING | Facility: CLINIC | Age: 39
End: 2019-11-13

## 2019-11-19 ENCOUNTER — COMMUNICATION - HEALTHEAST (OUTPATIENT)
Dept: NURSING | Facility: CLINIC | Age: 39
End: 2019-11-19

## 2019-11-19 ENCOUNTER — COMMUNICATION - HEALTHEAST (OUTPATIENT)
Dept: CARE COORDINATION | Facility: CLINIC | Age: 39
End: 2019-11-19

## 2019-11-19 ENCOUNTER — OFFICE VISIT - HEALTHEAST (OUTPATIENT)
Dept: BEHAVIORAL HEALTH | Facility: CLINIC | Age: 39
End: 2019-11-19

## 2019-11-19 DIAGNOSIS — F34.1 PERSISTENT DEPRESSIVE DISORDER WITH ANXIOUS DISTRESS, CURRENTLY MODERATE: ICD-10-CM

## 2019-11-27 ENCOUNTER — OFFICE VISIT - HEALTHEAST (OUTPATIENT)
Dept: FAMILY MEDICINE | Facility: CLINIC | Age: 39
End: 2019-11-27

## 2019-11-27 DIAGNOSIS — M25.511 CHRONIC PAIN OF BOTH SHOULDERS: ICD-10-CM

## 2019-11-27 DIAGNOSIS — G89.29 CHRONIC PAIN OF BOTH SHOULDERS: ICD-10-CM

## 2019-11-27 DIAGNOSIS — G47.00 INSOMNIA, UNSPECIFIED TYPE: ICD-10-CM

## 2019-11-27 DIAGNOSIS — K21.9 GASTROESOPHAGEAL REFLUX DISEASE WITHOUT ESOPHAGITIS: ICD-10-CM

## 2019-11-27 DIAGNOSIS — M25.512 CHRONIC PAIN OF BOTH SHOULDERS: ICD-10-CM

## 2019-11-27 DIAGNOSIS — L29.9 ITCHING: ICD-10-CM

## 2019-11-29 ENCOUNTER — COMMUNICATION - HEALTHEAST (OUTPATIENT)
Dept: FAMILY MEDICINE | Facility: CLINIC | Age: 39
End: 2019-11-29

## 2019-12-03 ENCOUNTER — COMMUNICATION - HEALTHEAST (OUTPATIENT)
Dept: NURSING | Facility: CLINIC | Age: 39
End: 2019-12-03

## 2019-12-06 ENCOUNTER — COMMUNICATION - HEALTHEAST (OUTPATIENT)
Dept: SCHEDULING | Facility: CLINIC | Age: 39
End: 2019-12-06

## 2019-12-10 ENCOUNTER — AMBULATORY - HEALTHEAST (OUTPATIENT)
Dept: BEHAVIORAL HEALTH | Facility: CLINIC | Age: 39
End: 2019-12-10

## 2019-12-10 ENCOUNTER — OFFICE VISIT - HEALTHEAST (OUTPATIENT)
Dept: BEHAVIORAL HEALTH | Facility: CLINIC | Age: 39
End: 2019-12-10

## 2019-12-10 DIAGNOSIS — F34.1 PERSISTENT DEPRESSIVE DISORDER WITH ANXIOUS DISTRESS, CURRENTLY MODERATE: ICD-10-CM

## 2019-12-10 DIAGNOSIS — F43.10 PTSD (POST-TRAUMATIC STRESS DISORDER): ICD-10-CM

## 2019-12-10 ASSESSMENT — ANXIETY QUESTIONNAIRES
1. FEELING NERVOUS, ANXIOUS, OR ON EDGE: MORE THAN HALF THE DAYS
GAD7 TOTAL SCORE: 9
3. WORRYING TOO MUCH ABOUT DIFFERENT THINGS: SEVERAL DAYS
IF YOU CHECKED OFF ANY PROBLEMS ON THIS QUESTIONNAIRE, HOW DIFFICULT HAVE THESE PROBLEMS MADE IT FOR YOU TO DO YOUR WORK, TAKE CARE OF THINGS AT HOME, OR GET ALONG WITH OTHER PEOPLE: SOMEWHAT DIFFICULT
6. BECOMING EASILY ANNOYED OR IRRITABLE: SEVERAL DAYS
2. NOT BEING ABLE TO STOP OR CONTROL WORRYING: MORE THAN HALF THE DAYS
7. FEELING AFRAID AS IF SOMETHING AWFUL MIGHT HAPPEN: NOT AT ALL
4. TROUBLE RELAXING: MORE THAN HALF THE DAYS
5. BEING SO RESTLESS THAT IT IS HARD TO SIT STILL: SEVERAL DAYS

## 2019-12-10 ASSESSMENT — PATIENT HEALTH QUESTIONNAIRE - PHQ9: SUM OF ALL RESPONSES TO PHQ QUESTIONS 1-9: 17

## 2019-12-18 ENCOUNTER — OFFICE VISIT - HEALTHEAST (OUTPATIENT)
Dept: FAMILY MEDICINE | Facility: CLINIC | Age: 39
End: 2019-12-18

## 2019-12-18 DIAGNOSIS — E55.9 VITAMIN D DEFICIENCY: ICD-10-CM

## 2019-12-18 DIAGNOSIS — N89.8 VAGINAL ITCHING: ICD-10-CM

## 2019-12-18 DIAGNOSIS — F33.3 SEVERE EPISODE OF RECURRENT MAJOR DEPRESSIVE DISORDER, WITH PSYCHOTIC FEATURES (H): ICD-10-CM

## 2019-12-18 DIAGNOSIS — K21.9 GASTROESOPHAGEAL REFLUX DISEASE WITHOUT ESOPHAGITIS: ICD-10-CM

## 2019-12-18 DIAGNOSIS — F43.10 PTSD (POST-TRAUMATIC STRESS DISORDER): ICD-10-CM

## 2019-12-18 DIAGNOSIS — G47.00 INSOMNIA, UNSPECIFIED TYPE: ICD-10-CM

## 2019-12-20 ENCOUNTER — OFFICE VISIT - HEALTHEAST (OUTPATIENT)
Dept: BEHAVIORAL HEALTH | Facility: CLINIC | Age: 39
End: 2019-12-20

## 2019-12-20 DIAGNOSIS — F43.10 PTSD (POST-TRAUMATIC STRESS DISORDER): ICD-10-CM

## 2019-12-20 DIAGNOSIS — F34.1 PERSISTENT DEPRESSIVE DISORDER WITH ANXIOUS DISTRESS, CURRENTLY MODERATE: ICD-10-CM

## 2019-12-24 ENCOUNTER — COMMUNICATION - HEALTHEAST (OUTPATIENT)
Dept: FAMILY MEDICINE | Facility: CLINIC | Age: 39
End: 2019-12-24

## 2019-12-24 DIAGNOSIS — H57.9 ITCHY EYES: ICD-10-CM

## 2020-01-14 ENCOUNTER — OFFICE VISIT - HEALTHEAST (OUTPATIENT)
Dept: BEHAVIORAL HEALTH | Facility: CLINIC | Age: 40
End: 2020-01-14

## 2020-01-14 DIAGNOSIS — F43.10 PTSD (POST-TRAUMATIC STRESS DISORDER): ICD-10-CM

## 2020-01-14 DIAGNOSIS — F34.1 PERSISTENT DEPRESSIVE DISORDER WITH ANXIOUS DISTRESS, CURRENTLY MODERATE: ICD-10-CM

## 2020-01-21 ENCOUNTER — OFFICE VISIT - HEALTHEAST (OUTPATIENT)
Dept: BEHAVIORAL HEALTH | Facility: CLINIC | Age: 40
End: 2020-01-21

## 2020-01-21 DIAGNOSIS — F43.10 PTSD (POST-TRAUMATIC STRESS DISORDER): ICD-10-CM

## 2020-01-21 DIAGNOSIS — F34.1 PERSISTENT DEPRESSIVE DISORDER WITH ANXIOUS DISTRESS, CURRENTLY MODERATE: ICD-10-CM

## 2020-02-04 ENCOUNTER — OFFICE VISIT - HEALTHEAST (OUTPATIENT)
Dept: BEHAVIORAL HEALTH | Facility: CLINIC | Age: 40
End: 2020-02-04

## 2020-02-04 DIAGNOSIS — F43.10 PTSD (POST-TRAUMATIC STRESS DISORDER): ICD-10-CM

## 2020-02-04 DIAGNOSIS — F34.1 PERSISTENT DEPRESSIVE DISORDER WITH ANXIOUS DISTRESS, CURRENTLY MODERATE: ICD-10-CM

## 2020-02-11 ENCOUNTER — OFFICE VISIT - HEALTHEAST (OUTPATIENT)
Dept: BEHAVIORAL HEALTH | Facility: CLINIC | Age: 40
End: 2020-02-11

## 2020-02-11 DIAGNOSIS — F34.1 PERSISTENT DEPRESSIVE DISORDER WITH ANXIOUS DISTRESS, CURRENTLY MODERATE: ICD-10-CM

## 2020-03-10 ENCOUNTER — OFFICE VISIT - HEALTHEAST (OUTPATIENT)
Dept: BEHAVIORAL HEALTH | Facility: CLINIC | Age: 40
End: 2020-03-10

## 2020-03-10 DIAGNOSIS — F43.10 PTSD (POST-TRAUMATIC STRESS DISORDER): ICD-10-CM

## 2020-03-10 DIAGNOSIS — F34.1 PERSISTENT DEPRESSIVE DISORDER WITH ANXIOUS DISTRESS, CURRENTLY MODERATE: ICD-10-CM

## 2020-03-23 ENCOUNTER — AMBULATORY - HEALTHEAST (OUTPATIENT)
Dept: BEHAVIORAL HEALTH | Facility: CLINIC | Age: 40
End: 2020-03-23

## 2020-03-25 ENCOUNTER — OFFICE VISIT - HEALTHEAST (OUTPATIENT)
Dept: BEHAVIORAL HEALTH | Facility: CLINIC | Age: 40
End: 2020-03-25

## 2020-03-25 DIAGNOSIS — G47.00 INSOMNIA, UNSPECIFIED TYPE: ICD-10-CM

## 2020-03-25 DIAGNOSIS — F43.10 PTSD (POST-TRAUMATIC STRESS DISORDER): ICD-10-CM

## 2020-03-25 DIAGNOSIS — F34.1 PERSISTENT DEPRESSIVE DISORDER WITH ANXIOUS DISTRESS, CURRENTLY MODERATE: ICD-10-CM

## 2020-04-16 ENCOUNTER — OFFICE VISIT - HEALTHEAST (OUTPATIENT)
Dept: BEHAVIORAL HEALTH | Facility: CLINIC | Age: 40
End: 2020-04-16

## 2020-04-16 DIAGNOSIS — F34.1 PERSISTENT DEPRESSIVE DISORDER WITH ANXIOUS DISTRESS, CURRENTLY MODERATE: ICD-10-CM

## 2020-04-16 DIAGNOSIS — F43.10 PTSD (POST-TRAUMATIC STRESS DISORDER): ICD-10-CM

## 2020-04-22 ENCOUNTER — AMBULATORY - HEALTHEAST (OUTPATIENT)
Dept: FAMILY MEDICINE | Facility: CLINIC | Age: 40
End: 2020-04-22

## 2020-04-22 ENCOUNTER — COMMUNICATION - HEALTHEAST (OUTPATIENT)
Dept: FAMILY MEDICINE | Facility: CLINIC | Age: 40
End: 2020-04-22

## 2020-04-22 DIAGNOSIS — N76.1 CHRONIC VAGINITIS: ICD-10-CM

## 2020-04-22 RX ORDER — MICONAZOLE NITRATE 20 MG/G
CREAM TOPICAL
Qty: 15 G | Refills: 1 | Status: SHIPPED | OUTPATIENT
Start: 2020-04-22 | End: 2021-10-04

## 2020-06-12 ENCOUNTER — OFFICE VISIT - HEALTHEAST (OUTPATIENT)
Dept: FAMILY MEDICINE | Facility: CLINIC | Age: 40
End: 2020-06-12

## 2020-06-12 DIAGNOSIS — M25.50 ARTHRALGIA, UNSPECIFIED JOINT: ICD-10-CM

## 2020-06-12 DIAGNOSIS — N89.8 VAGINAL ITCHING: ICD-10-CM

## 2020-06-12 ASSESSMENT — PATIENT HEALTH QUESTIONNAIRE - PHQ9: SUM OF ALL RESPONSES TO PHQ QUESTIONS 1-9: 9

## 2020-09-01 ENCOUNTER — OFFICE VISIT - HEALTHEAST (OUTPATIENT)
Dept: FAMILY MEDICINE | Facility: CLINIC | Age: 40
End: 2020-09-01

## 2020-09-01 DIAGNOSIS — L81.9 HYPERPIGMENTATION: ICD-10-CM

## 2020-09-01 DIAGNOSIS — Z13.1 ENCOUNTER FOR SCREENING FOR DIABETES MELLITUS: ICD-10-CM

## 2020-09-01 DIAGNOSIS — F34.1 PERSISTENT DEPRESSIVE DISORDER WITH ANXIOUS DISTRESS, CURRENTLY MODERATE: ICD-10-CM

## 2020-09-01 DIAGNOSIS — Z12.4 ENCOUNTER FOR SCREENING FOR CERVICAL CANCER: ICD-10-CM

## 2020-09-01 DIAGNOSIS — F43.10 PTSD (POST-TRAUMATIC STRESS DISORDER): ICD-10-CM

## 2020-09-01 DIAGNOSIS — Z00.00 ENCOUNTER FOR GENERAL ADULT MEDICAL EXAMINATION WITHOUT ABNORMAL FINDINGS: ICD-10-CM

## 2020-09-01 DIAGNOSIS — H10.10 SEASONAL ALLERGIC CONJUNCTIVITIS: ICD-10-CM

## 2020-09-01 DIAGNOSIS — E55.9 VITAMIN D DEFICIENCY: ICD-10-CM

## 2020-09-01 LAB
CHOLEST SERPL-MCNC: 152 MG/DL
CLUE CELLS: NORMAL
FASTING STATUS PATIENT QL REPORTED: YES
FASTING STATUS PATIENT QL REPORTED: YES
GLUCOSE BLD-MCNC: 66 MG/DL (ref 70–99)
HDLC SERPL-MCNC: 45 MG/DL
LDLC SERPL CALC-MCNC: 93 MG/DL
TRICHOMONAS, WET PREP: NORMAL
TRIGL SERPL-MCNC: 71 MG/DL
YEAST, WET PREP: NORMAL

## 2020-09-01 ASSESSMENT — MIFFLIN-ST. JEOR: SCORE: 1115.05

## 2020-09-02 LAB
25(OH)D3 SERPL-MCNC: 33.5 NG/ML (ref 30–80)
C TRACH DNA SPEC QL PROBE+SIG AMP: NEGATIVE
HPV SOURCE: NORMAL
HUMAN PAPILLOMA VIRUS 16 DNA: NEGATIVE
HUMAN PAPILLOMA VIRUS 18 DNA: NEGATIVE
HUMAN PAPILLOMA VIRUS FINAL DIAGNOSIS: NORMAL
HUMAN PAPILLOMA VIRUS OTHER HR: NEGATIVE
N GONORRHOEA DNA SPEC QL NAA+PROBE: NEGATIVE
SPECIMEN DESCRIPTION: NORMAL

## 2020-09-14 ENCOUNTER — COMMUNICATION - HEALTHEAST (OUTPATIENT)
Dept: FAMILY MEDICINE | Facility: CLINIC | Age: 40
End: 2020-09-14

## 2020-09-29 ENCOUNTER — RECORDS - HEALTHEAST (OUTPATIENT)
Dept: ADMINISTRATIVE | Facility: OTHER | Age: 40
End: 2020-09-29

## 2020-12-17 ASSESSMENT — PATIENT HEALTH QUESTIONNAIRE - PHQ9: SUM OF ALL RESPONSES TO PHQ QUESTIONS 1-9: 11

## 2020-12-18 ENCOUNTER — OFFICE VISIT - HEALTHEAST (OUTPATIENT)
Dept: FAMILY MEDICINE | Facility: CLINIC | Age: 40
End: 2020-12-18

## 2020-12-18 DIAGNOSIS — R45.851 SUICIDAL IDEATION: ICD-10-CM

## 2020-12-18 DIAGNOSIS — R11.0 NAUSEA: ICD-10-CM

## 2020-12-18 DIAGNOSIS — K21.9 GASTROESOPHAGEAL REFLUX DISEASE WITHOUT ESOPHAGITIS: ICD-10-CM

## 2020-12-18 DIAGNOSIS — F34.1 PERSISTENT DEPRESSIVE DISORDER WITH ANXIOUS DISTRESS, CURRENTLY MODERATE: ICD-10-CM

## 2020-12-18 DIAGNOSIS — M25.50 ARTHRALGIA, UNSPECIFIED JOINT: ICD-10-CM

## 2020-12-18 DIAGNOSIS — R53.83 FATIGUE, UNSPECIFIED TYPE: ICD-10-CM

## 2020-12-18 LAB
BASOPHILS # BLD AUTO: 0.1 THOU/UL (ref 0–0.2)
BASOPHILS NFR BLD AUTO: 1 % (ref 0–2)
EOSINOPHIL # BLD AUTO: 0.1 THOU/UL (ref 0–0.4)
EOSINOPHIL NFR BLD AUTO: 2 % (ref 0–6)
ERYTHROCYTE [DISTWIDTH] IN BLOOD BY AUTOMATED COUNT: 13.6 % (ref 11–14.5)
HCG UR QL: NEGATIVE
HCT VFR BLD AUTO: 38 % (ref 35–47)
HGB BLD-MCNC: 12.3 G/DL (ref 12–16)
IMM GRANULOCYTES # BLD: 0 THOU/UL
IMM GRANULOCYTES NFR BLD: 0 %
LYMPHOCYTES # BLD AUTO: 1.6 THOU/UL (ref 0.8–4.4)
LYMPHOCYTES NFR BLD AUTO: 21 % (ref 20–40)
MCH RBC QN AUTO: 29.9 PG (ref 27–34)
MCHC RBC AUTO-ENTMCNC: 32.4 G/DL (ref 32–36)
MCV RBC AUTO: 93 FL (ref 80–100)
MONOCYTES # BLD AUTO: 0.5 THOU/UL (ref 0–0.9)
MONOCYTES NFR BLD AUTO: 7 % (ref 2–10)
NEUTROPHILS # BLD AUTO: 5.2 THOU/UL (ref 2–7.7)
NEUTROPHILS NFR BLD AUTO: 70 % (ref 50–70)
PLATELET # BLD AUTO: 169 THOU/UL (ref 140–440)
PMV BLD AUTO: 12.9 FL (ref 8.5–12.5)
RBC # BLD AUTO: 4.11 MILL/UL (ref 3.8–5.4)
TSH SERPL DL<=0.005 MIU/L-ACNC: 0.86 UIU/ML (ref 0.3–5)
WBC: 7.5 THOU/UL (ref 4–11)

## 2020-12-18 RX ORDER — ACETAMINOPHEN 500 MG
500 TABLET ORAL EVERY 6 HOURS PRN
Qty: 100 TABLET | Refills: 0 | Status: SHIPPED | OUTPATIENT
Start: 2020-12-18 | End: 2021-10-04

## 2020-12-18 RX ORDER — FAMOTIDINE 40 MG/1
40 TABLET, FILM COATED ORAL EVERY EVENING
Qty: 30 TABLET | Refills: 2 | Status: SHIPPED | OUTPATIENT
Start: 2020-12-18 | End: 2021-10-04

## 2020-12-18 ASSESSMENT — MIFFLIN-ST. JEOR: SCORE: 1104.84

## 2021-01-18 ENCOUNTER — OFFICE VISIT - HEALTHEAST (OUTPATIENT)
Dept: FAMILY MEDICINE | Facility: CLINIC | Age: 41
End: 2021-01-18

## 2021-01-18 DIAGNOSIS — B18.1 CHRONIC VIRAL HEPATITIS B WITHOUT DELTA AGENT AND WITHOUT COMA (H): ICD-10-CM

## 2021-01-18 DIAGNOSIS — R63.0 DECREASED APPETITE: ICD-10-CM

## 2021-01-18 DIAGNOSIS — F33.3 SEVERE EPISODE OF RECURRENT MAJOR DEPRESSIVE DISORDER, WITH PSYCHOTIC FEATURES (H): ICD-10-CM

## 2021-01-18 ASSESSMENT — PATIENT HEALTH QUESTIONNAIRE - PHQ9: SUM OF ALL RESPONSES TO PHQ QUESTIONS 1-9: 15

## 2021-01-18 ASSESSMENT — MIFFLIN-ST. JEOR: SCORE: 1101.44

## 2021-02-18 ENCOUNTER — OFFICE VISIT - HEALTHEAST (OUTPATIENT)
Dept: FAMILY MEDICINE | Facility: CLINIC | Age: 41
End: 2021-02-18

## 2021-02-18 DIAGNOSIS — T14.8XXA MUSCLE STRAIN: ICD-10-CM

## 2021-02-18 DIAGNOSIS — H10.10 SEASONAL ALLERGIC CONJUNCTIVITIS: ICD-10-CM

## 2021-02-18 RX ORDER — IBUPROFEN 600 MG/1
600 TABLET, FILM COATED ORAL EVERY 6 HOURS PRN
Qty: 60 TABLET | Refills: 2 | Status: SHIPPED | OUTPATIENT
Start: 2021-02-18 | End: 2022-03-31

## 2021-02-18 RX ORDER — CETIRIZINE HYDROCHLORIDE 10 MG/1
10 TABLET ORAL DAILY
Qty: 30 TABLET | Refills: 2 | Status: SHIPPED | OUTPATIENT
Start: 2021-02-18 | End: 2021-10-04

## 2021-02-18 ASSESSMENT — MIFFLIN-ST. JEOR: SCORE: 1146.8

## 2021-02-19 ENCOUNTER — COMMUNICATION - HEALTHEAST (OUTPATIENT)
Dept: FAMILY MEDICINE | Facility: CLINIC | Age: 41
End: 2021-02-19

## 2021-03-18 ENCOUNTER — OFFICE VISIT - HEALTHEAST (OUTPATIENT)
Dept: FAMILY MEDICINE | Facility: CLINIC | Age: 41
End: 2021-03-18

## 2021-03-18 DIAGNOSIS — M77.11 LATERAL EPICONDYLITIS OF RIGHT ELBOW: ICD-10-CM

## 2021-03-18 ASSESSMENT — MIFFLIN-ST. JEOR: SCORE: 1164.94

## 2021-05-26 ASSESSMENT — PATIENT HEALTH QUESTIONNAIRE - PHQ9: SUM OF ALL RESPONSES TO PHQ QUESTIONS 1-9: 17

## 2021-05-27 ASSESSMENT — PATIENT HEALTH QUESTIONNAIRE - PHQ9
SUM OF ALL RESPONSES TO PHQ QUESTIONS 1-9: 11
SUM OF ALL RESPONSES TO PHQ QUESTIONS 1-9: 9
SUM OF ALL RESPONSES TO PHQ QUESTIONS 1-9: 15

## 2021-05-28 ASSESSMENT — ANXIETY QUESTIONNAIRES: GAD7 TOTAL SCORE: 9

## 2021-05-28 NOTE — TELEPHONE ENCOUNTER
Fax received from [facility] pharmacy requesting Prior Authorization    Medication Name: Olopatadine 0.1% eye drops    Insurance Plan: [facility]   PBM:    Patient ID Number: [id number]    Please start PA process

## 2021-05-28 NOTE — PROGRESS NOTES
Subjective:     Silvino Caceres is a 39 y.o. female who presents for an annual exam.     Other concerns today:    1.  Chronic pain.  She reports she has had coldness or feverish type symptoms along with muscle aches for 8 years.  Symptoms can come and go.  She has not necessarily had any muscle aches for at least the past 2 years.  She always feels tired during the day.  Sometimes feels short of breath when she is tired.    2.  Major depression and PTSD.  Patient was taking Seroquel and Remeron at night.  She has not taken any medicines for about 4 months.  She says the medicines were not helping, and the actually made it harder for her to sleep.  She was seeing a therapist, but has not seen a therapist in at least 6 months.  She is not sure if therapy was helping much.  She is not sure if she wants to see a therapist again.  She says that she feels sad often, has passive thoughts that it would be better off if she were not here, no active thoughts of hurting herself or others, no plan.    3. Vaginal/vulvar itching.  She was seen by Dr. Mann last week for vaginal itching.  I reviewed office note and lab results today.  Wet prep and gonorrhea chlamydia all negative.  Patient was treated for presumptive yeast with 1 oral fluconazole pill.  She notes that this pill seem to help a lot, though she still has some lingering symptoms.  No current vaginal discharge.    4.  Abdominal pain.  Constant mild abdominal pain, primarily at the umbilicus or a little above that.  Some nausea.  No vomiting, constipation, or diarrhea.  She does sometimes have some mild urinary urge incontinence.  No stress incontinence.    Regular periods every month, lasting about 3 days, sees dentist, sometimes itchy eyes but has not used any eyedrops  She says she has never been sexually active, never pregnant.  She is chewing betel not daily.  No alcohol, drugs, or smoking.  Diet is okay.  No exercise.  She works as a PCA.  She lives with her  friend's and family.  She reports feeling more fatigued than normal recently.  Has hepatitis B, following with [facility].  History of vitamin D deficiency, what I can tell she is not currently taking vitamin D.    Immunization History   Administered Date(s) Administered     Hepatitis A: Immune 2015     Influenza, inj, historic,unspecified 2016     Influenza, seasonal,quad inj 36+ mos 2017     Influenza,seasonal quad, PF, 36+MOS 2018     Tdap 2014       Gynecologic History  Patient's last menstrual period was 2019 (exact date).  Contraception: none  Last Pap:   Last mammogram: none    OB History    Para Term  AB Living   0 0 0 0 0 0   SAB TAB Ectopic Multiple Live Births   0 0 0 0 0         Current Outpatient Medications:      cholecalciferol, vitamin D3, (VITAMIN D3) 2,000 unit Tab, Take 1 tablet (2,000 Units total) by mouth daily., Disp: 100 tablet, Rfl: 1     hydrocortisone (ANUSOL-HC) 2.5 % rectal cream, Apply topically to affected area BID - no longer than 14 days, Disp: 30 g, Rfl: 1     miconazole (MICATIN) 2 % cream, Apply to affected area 2 times daily, Disp: 15 g, Rfl: 1     mirtazapine (REMERON) 30 MG tablet, Take 1 tablet (30 mg total) by mouth at bedtime., Disp: 30 tablet, Rfl: 5     olopatadine (PATANOL) 0.1 % ophthalmic solution, 1-2 gtts/eye two times a day as needed for itchiness, Disp: 5 mL, Rfl: 1     QUEtiapine (SEROQUEL) 25 MG tablet, Take 2 tablets (50 mg total) by mouth at bedtime., Disp: 30 tablet, Rfl: 1  Past Medical History:   Diagnosis Date     Depression      H. pylori infection      Hepatitis B      Past Surgical History:   Procedure Laterality Date     LIPOMA RESECTION      left face     Patient has no known allergies.  Family History   Problem Relation Age of Onset     No Medical Problems Mother      Social History     Socioeconomic History     Marital status: Single     Spouse name: Not on file     Number of children: Not on file      Years of education: Not on file     Highest education level: Not on file   Occupational History     Not on file   Social Needs     Financial resource strain: Not on file     Food insecurity:     Worry: Not on file     Inability: Not on file     Transportation needs:     Medical: Not on file     Non-medical: Not on file   Tobacco Use     Smoking status: Never Smoker     Smokeless tobacco: Current User     Tobacco comment: betle nut   Substance and Sexual Activity     Alcohol use: No     Drug use: No     Sexual activity: Never   Lifestyle     Physical activity:     Days per week: Not on file     Minutes per session: Not on file     Stress: Not on file   Relationships     Social connections:     Talks on phone: Not on file     Gets together: Not on file     Attends Anabaptism service: Not on file     Active member of club or organization: Not on file     Attends meetings of clubs or organizations: Not on file     Relationship status: Not on file     Intimate partner violence:     Fear of current or ex partner: Not on file     Emotionally abused: Not on file     Physically abused: Not on file     Forced sexual activity: Not on file   Other Topics Concern     Not on file   Social History Narrative     Not on file     Review of Systems  Complete Review of Systems is discussed with patient and is negative except as noted in HPI.    Objective:     Vitals:    04/26/19 0829   BP: 116/72   Pulse: 62     Body mass index is 22.38 kg/m .    Physical Exam:  General: Patient is alert and Oriented x 3, in no apparent distress.  HEENT, Thyroid, Lymphatic, Cardiac, Pulmonary, GI, Musculoskeletal, and Neuro exams were completed today and grossly normal.  Breast Exam: No lumps, skin changes, lymphadenopathy, or nipple discharge noted bilaterally.  Genitourinary Exam: deferred    Today's labs pending.    Assessment and Plan:     1. Annual physical exam  Health Maintenance discussed with patient as appropriate for age and risk  factors.  She is up-to-date on Pap test.  She declines STD testing.  We will follow-up with her pending screening labs.  - Glucose  - Lipid Cascade  - fluconazole (DIFLUCAN) 150 MG tablet; Take 1 tablet (150 mg total) by mouth once for 1 dose.  Dispense: 1 tablet; Refill: 0    2. Severe episode of recurrent major depressive disorder, with psychotic features (H)  3. PTSD (post-traumatic stress disorder)  She has stopped taking Seroquel and Remeron.  She feels like they did not help, and actually made her sleep worse.  She was initially hesitant to follow-up with our counselors today.  However after discussion she is agreeable to at least meet with them once, appointment made.  She does not seem interested in doing a lot of follow-up for her symptoms, but at the same time states her symptoms seem worse than they have been in the past.  If she were to restart medicine, she says that she does not want to take the same medicines that she tried before.  Patient denies any thoughts of wanting to hurt him/herself or others and does contract for safety.    4. Vaginal itching.  Symptoms have improved significantly, but not completely.  One more oral fluconazole pill prescribed for patient today.  If this does not completely treat her symptoms, she would need to return to clinic for follow-up.    5. Chronic viral hepatitis B without delta agent and without coma (H)  Following with [facility].  Last visit was in December.  I reviewed lab results from their clinic today.  I was unable to find an actual office note from that visit.  Patient states that he is due for a follow-up visit with them in June.  No acute concerns today.    6. Fatigue, unspecified type  No acute concerns.  Could be due to inadequately treated depression and PTSD.  I will follow-up with pending labs.  - Thyroid Stimulating Hormone (TSH)  - Hemoglobin    7. Itchy eyes  Exam grossly normal today.  Has not had an eye exam that she can recall.  - Ambulatory  referral to Ophthalmology    8. Stomach pain  She does not have an acute abdomen.  This was not her most concerning complaint.  Past history of H. pylori.  Discussed monitoring versus trial of omeprazole.  She would like omeprazole.  Pain does not seem to be related to hepatitis B.  Could consider retesting for H. pylori with stool antigen if symptoms do not resolve.  - omeprazole (PRILOSEC) 20 MG capsule; Take 1 capsule (20 mg total) by mouth daily.  Dispense: 30 capsule; Refill: 11    9. Vitamin D deficiency  I will review test results when available.  I do not believe she is taking vitamin D at this time.  - Vitamin D, Total (25-Hydroxy)      This dictation uses voice recognition software, which may contain typographical errors.
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
## 2021-05-28 NOTE — TELEPHONE ENCOUNTER
Central PA team  [phone]  Pool: HE PA MED ([id number])          PA has been initiated.       PA form completed and faxed insurance via Cover My Meds     Key:  [id number]      Medication: Olopatadine HCl 0.1% OP SOLN    Insurance:  [employer] [facility] Medicaid         Response will be received via fax and may take up to 5-10 business days depending on plan

## 2021-05-28 NOTE — PROGRESS NOTES
[facility] Office Visit    Chief Complaint:  Chief Complaint   Patient presents with     Eye Itchy     Bilateral x1 month     Vaginal Itchy     White Discharge - Sometimes          Assessment/Plan:  1. Vaginal itching  Based on her symptoms wonder about recurrent yeast/vaginal infection with her menstrual cycle.  No symptoms currently.  Given diflucan x1 as this did help her in past and she was found to be yeast positive.  Consider dermatitis picture, etc if ongoing.    - Wet Prep, Vaginal  - Chlamydia trachomatis & Neisseria gonorrhoeae, Amplified Detection  - fluconazole (DIFLUCAN) 150 MG tablet; Take 1 tablet (150 mg total) by mouth once for 1 dose.  Dispense: 1 tablet; Refill: 0    2. Allergic conjunctivitis of both eyes  Sx consistent with allergic conjunctivitis- trial of patanol as below.  F/u with eye doctor recommended.    - olopatadine (PATANOL) 0.1 % ophthalmic solution; 1-2 gtts/eye two times a day as needed for itchiness  Dispense: 5 mL; Refill: 1      Return in about 3 months (around 7/19/2019) for Annual physical.    Patient Education/AVS:  There are no Patient Instructions on file for this visit.    HPI:   Silvino Caceres is a 39 y.o. female c/o itchy eyes and vaginal itching.  Overall feeling very cold during the day time and feels normal at night.      Both eyes are itchy like she has an infection.  Started about a month ago.  Some days are good and some days are worse.  Seems to be worse at night.  Never had this before.  Has lived in [place] for 2 years now.  No runny nose or congestion, itchy throat or ears.  Does note some sneezing.  Does get some allergies.  No drainage from the eyes.  Eyes do get watery.  Eyes get red with rubbing only.  No contacts with eye symptoms or infections.      Vaginal itching and some white discharge.  Started before her last period with vaginal itching and keeps her awake at night.  After her period her symptoms continue.  Itching is painful at times.   Had symptoms for 6 days total and better now.  No more itching, just pain.  Worried that she has injury to this area and wants an exam.  No bleeding after her period.  Has had this problem on and off since moving to [place] 2 years ago.  Tends to get this monthly before/during/after her period.  Has had yeast infection dx in past and diflucan by mouth and pt notes improved sx for about 6 months and then sx started to come back again.  Not  and not sexually active.  No known family h/o diabetes.  Screening for diabetes neg.      ROS:  Constitutional, CV, Resp, GI, , MSK, skin, neuro, psych all negative except as outlined in the HPI above and patient denies any other symptoms.      History summarized from1-2:seen 2/2019 for generalized allergic reaction treated with benadryl for itching and asked to f/u with PCP for ongoing sx.    Lab tests reviewed-1: wet prep 2018 normal, 2017 yeast. a1c 5.3 7/2018    Physical Exam:  BP 90/66 (Patient Site: Right Arm, Patient Position: Sitting, Cuff Size: Adult Regular)   Pulse 76   Wt 118 lb (53.5 kg)   LMP 04/03/2019 (Exact Date)   Breastfeeding? No   BMI 22.30 kg/m   Body mass index is 22.3 kg/m . Patient's last menstrual period was 04/03/2019 (exact date).  Vital signs reviewed  Wt Readings from Last 3 Encounters:   04/19/19 118 lb (53.5 kg)   02/08/19 124 lb (56.2 kg)   12/28/18 119 lb 8 oz (54.2 kg)     Social History     Tobacco Use   Smoking Status Never Smoker   Smokeless Tobacco Current User   Tobacco Comment    betle nut     Social History     Substance and Sexual Activity   Sexual Activity Never     No data recorded  PHQ-9 Total Score: 4 (2/8/2019  2:43 PM)    PHQ-2 Total Score: 0 (2/8/2019  2:43 PM)    No data recorded    All normal as below except abnormalities include: GYN:  Normal external genitalia.  Healthy normal vaginal mucosa.  Health appearing cervix.  Testing obtained without pain or difficulty.  Normal bimanual exam.    General is a  39 y.o. female  sitting comfortably in no apparent distress.   HEENT:  TM are clear bilaterally.  Eye, nasal, oral exams within normal   Neck: Supple without lymphadenopathy or thyromegally  CV: Regular rate and rhythm S1S2 without rubs, murmurs or gallops,   Lungs: Clear to auscultation bilaterally  Abd:  +BS, soft NT/ND,  No masses or organomegally  Extremities: Warm, No Edema, 2+ Pedal and radial pulses bilaterally  Skin: No lesions or rashes noted  Neuro/MSK: Able to ambulate around the exam room with equal movement, strength and normal coordination of the upper and lower extremeties symmetrically    Results for orders placed or performed in visit on 04/19/19   Wet Prep, Vaginal   Result Value Ref Range    Yeast Result No yeast seen No yeast seen    Trichomonas No Trichomonas seen No Trichomonas seen    Clue Cells, Wet Prep No Clue cells seen No Clue cells seen   Chlamydia trachomatis & Neisseria gonorrhoeae, Amplified Detection   Result Value Ref Range    Chlamydia trachomatis, Amplified Detection Negative Negative    Neisseria gonorrhoeae, Amplified Detection Negative Negative       Med list and active problem list reviewed and updated as part of this encounter    Current Outpatient Medications on File Prior to Visit   Medication Sig Dispense Refill     hydrocortisone (ANUSOL-HC) 2.5 % rectal cream Apply topically to affected area BID - no longer than 14 days 30 g 1     cholecalciferol, vitamin D3, (VITAMIN D3) 2,000 unit Tab Take 1 tablet (2,000 Units total) by mouth daily. 100 tablet 1     miconazole (MICATIN) 2 % cream Apply to affected area 2 times daily 15 g 1     mirtazapine (REMERON) 30 MG tablet Take 1 tablet (30 mg total) by mouth at bedtime. 30 tablet 5     QUEtiapine (SEROQUEL) 25 MG tablet Take 2 tablets (50 mg total) by mouth at bedtime. 30 tablet 1     No current facility-administered medications on file prior to visit.          Jeniffer Mann MD

## 2021-05-29 NOTE — PROGRESS NOTES
"Mental Health Visit Note    6/17/2019    Start time: 2:15pm    Stop Time: 2:47pm   Session # 2    Session Type: Patient is presenting for an Individual session.    Silvino Caceres is a 39 y.o. female is being seen today for    Chief Complaint   Patient presents with      Follow Up     Patient presented for follow up psychotherapy to address coping wiht fatigue and health  concerns   .     New symptoms or complaints: None    Functional Impairment:   Personal: 4  Family: 3  Work: 3  Social:3    Clinical assessment of mental status: Grooming: Well groomed  Attire: Appropriate  Age: Appears Stated  Behavior Towards Examiner: Guarded/Evasive  Motor Activity: Within normal   Eye Contact: Appropriate  Mood: Depressed  Affect: Congruent w/content of speech and Flat  Speech/Language: Delayed/Hesitant and Soft  Attention: Distractible  Concentration: Brief  Thought Process: Within normal  Thought Content: Hallucinations: Within noraml  Delusions: Within normal  Orientation: X 3  Memory: Impairment  Judgement: No Evidence of Impairment  Estimated Intelligence: Below Average  Demonstrated Insight: Diminished  Fund of Knowledge: adequate        Suicidal/Homicidal Ideation present: None Reported This Session    Patient's impression of their current status: Patient reports body is chilly often or if she is active, it is achy.   Patient endorses the following symptoms:   Sleep: insomnia most days. Nightmares some days.  Depressed mood: feeling down most days  Anxiety/Worries: \"no worries\"  Pain: feel body aches after gardening work  Appetite: poor most days  Engagement in activities: gardening. Stopped attending classes because no transportation (friend stopped attending for summer whom she rides with). 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  Medication Adherence: poor      Therapist impression of patients current state: Patient presented for follow up individual psychotherapy visit. A professional Yareli  from [facility], Veronique WORTHY, was " present for the visit due to the language complexity. Patient continues to present with somatic symptoms that are characteristic of depression. She presents with depressed mood and flat affect. She lacks energy and motivation and engages in limited activities during the day. This likely impacts her ability to sleep at night as well. She lacks insight into how limited engagement in activities and isolation impact her mental health. She is at the contemplative stage of change in regards to new employment and is not ready to take action steps at this time.  Prior to next session, she will work on implementing relaxation techniques and sleep routine for bed.  Prior to next session, patient will schedule appointment with Johnna Morales PA-C to further assess medication to help manage insomnia/nightmares, depressed mood, and other bothersome health symptoms noted by patient. Patient was previously on Seroquel but stopped the medication as she felt it was not effective in treatment insomnia. Physician could also consult as needed with PharmD to inquire about alternative medications that may help manage depression and PTSD diagnoses rather than Seroquel. It will be important that patient take medications as prescribed in order for them to be effective. She has a history of poor medication adherence.   Therapist referred patient to Clinic Care coordination program.       Type of psychotherapeutic technique provided: Insight oriented and Client centered, motivational interviewing, sleep hygiene.     Progress toward short term goals:Poor progress, decreased activity as she is no longer going to classes. She did not schedule wiht a physician to address health concerns she mentioned last session. Continues to be tired and have poor sleep. Lacking motivation to take action on her own behalf outside of attending appointment.    Review of long term goals: Not done at today's visit. Effective  06/03/2019-09/03/2019.    Diagnosis:   1. Persistent depressive disorder with anxious distress, currently moderate    2. PTSD (post-traumatic stress disorder)        Plan and Follow up: Patient is scheduled for follow up psychotherapy on 7/1/19.      Discharge Criteria/Planning: Patient will continue with follow-up until therapy can be discontinued without return of signs and symptoms.    Rae Lentz [facility] 6/17/2019

## 2021-05-29 NOTE — PROGRESS NOTES
Outpatient Mental Health Treatment Plan     Name:  Silvino Caceres  :  1980  MRN:  [id number]     Treatment Plan:  Updated Treatment Plan  Intake/initial treatment plan date:  2017                   Benefit and risks and alternatives have been discussed: Yes  Is this treatment appropriate with minimal intrusion/restrictions: Yes  Estimated duration of treatment:  Approximately 20 sessions.  Anticipated frequency of services:  Every 1-2 weeks  Necessity for frequency: This frequency is needed to establish therapeutic goals and for continuity of care in order to monitor progress.  Necessity for treatment: To address cognitive, behavioral, and/or emotional barriers in order to work toward goals and to improve quality of life.     Plan:      ? Depression                                     Goal:              Decrease average depression level from 4 to 1.                        Strategies:                 ?[x] Decrease social isolation                                                                    [x] Increase involvement in meaningful activities                                                                    ?[x] Discuss sleep hygiene                                                                    ?[x] Explore thoughts and expectations about self and others                                                                    ?[x] Process grief (loss of significant person, independence, role, etc.)                                                                    ?[x] Assess for suicide risk                                                                    ?[x] Implement physical activity routine (with physician approval)                                                                    [x] Consider introduction of bibliotherapy and/or videos                                                                    [x] Continue compliance with medical treatment plan (or explore barriers)        - Referral to  Pending sale to [facility] through [facility]                                                                     ?     ?Degree to which this is a problem: 4  Degree to which goal is met: 1  Date of Review: 06/03/2019-09/03/2019        ?Other:  PTSD  Goal: Decrease average impact of PTSD symptoms from 4 to 1                        Strategies:                             - Forge a therapeutic alliance built on trust in order to allow for further processing of trauma experience.                        - Assessment of client-acculturation process. Psycho-education about western-therapy.                        - Exposure therapy and psycho-education to develop a sense normalization, legitimization, and description of trauma reactions.                         - Development of trauma timeline or narrative.                        - Cognitive Restructuring to help make sense of the trauma and to put meaning to the trauma- develop new insights and thorough understanding of behaviors during the event- modify feelings of guilt and shame.                         - Learn and implement somatic/experiential techniques.                        - Learn and Implement Mindfulness/Grounding techniques to decrease hyperarousal.             ?      Degree to which this is a problem: 4  Degree to which goal is met: 1  Date of Review: 06/03/2019-09/03/2019        *6/3/2019-Denies any anger concerns anymore. Therefore, goal was discontinued.        Patient may also benefit from the following services and referrals:   - Psychiatry services (if patient willing to re-establish care and take medications again)  - Pending sale to [facility]  - Clinic Care Coordination (Patient declined at this time)  - Determine eligibility for any additional financial supports/services: energy assistance, housing assistance, additional food support, etc.  - Employment support- [facility] [employer] [facility] Workforce Development services or Workforce Center.   -Increased social support/socialization (Patient  "declining [facility] at this time)  -Continue attending English classes        Functional Impairment:   Personal: 4  Family: 3  Work: 2  Social:4     Diagnosis   PTSD (F43.10)  Persistent Depressive Disorder (Dysthymia) (F34.1)    WHODAS 2.0 12-item version: 19 or 40%    Scores presented in qualifiers to represent level of disability.  MODERATE Problem (medium, fair, ...) 25-49%  H1= 25  H2= 5  H3= 12        Clinical assessments and measures completed:. SNEHAL-7, PHQ-9, PCL-5, CAGE-AID and C-SSRS  PHQ-9 = 13, moderate depression.   SNEHAL-7 = 05, minimal anxiety.    PCL-5 = 32  CAGE: 0/4  C-SSRS= Low to moderate risk. Passive Wish to be dead. \"If I die, it would be better\"    Strengths: Patient is resourceful and resilient. She maintains regularly appointments and always attends.       Limitations: Tendency to isolate. Lacks social support. Patient appeared to be a poor historian, lacking insight regarding the impact of underlying emotional issues. Despite extensive prompting, the patient has difficulty engaging in the therapeutic process but displays some willingness to gain trust in the therapist. She is often guarded in session. Stopped taking medications without oversight of physician.     Cultural Considerations: The patient is a [place] female who was born in [place]. She grew up in [place] and eventually lived in a refugee camp which she left at age 25. She moved to [place] in 2011 but first lived in [place] and [place] before finally relocating to [place] in 2016. She experiences acculturation difficulties and barriers. Patient relies on the assistance of a professional, Yareli  to communicate with providers.      Persons responsible for this plan: Patient, Provider and Other: Coordinate with PCP and psychiatry as appropriate.             Rae Lentz LICSW  06/03/2019  Psychotherapist Signature         Silvino Caceres 06/03/2019  Patient Signature:              Guardian " Signature             Provider: Performed and documented by BIMAL Arevalo   Date:  6/3/2019

## 2021-05-29 NOTE — PROGRESS NOTES
Referral for Clinic care Coordination program.     Potential CCC Goals:  Ensuring [facility] gets established (referred to Volodymyr by therapist). [facility] to help with paperwork and mail, employment, symptom management and medication adherence.   Improve medication adherence.   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  FRG help re: food support and energy assistance or other financial support.   Decrease insomnia and nightmares. See physician to find new/different medication than Seroquel as patient reports it was unhelpful.     Rae WALLIS

## 2021-05-29 NOTE — PROGRESS NOTES
: Jaden ID: [id number] Agency: Language Line    Attempt 1:  Care guide left a message for the patient about CCC enrollment.  If the patient is trying to call the Care guide back transfer them to Orion Brock at [phone].    Next Outreach: 6/25/19

## 2021-05-29 NOTE — PROGRESS NOTES
Faxed [place] referral form, signed JAEL, and copy of diagnostic assessment to [facility] for ARM referral. Patient is in agreement with [facility] services. Fax sent. Documents sent to be scanned into EMR. Rae WALLIS

## 2021-05-29 NOTE — PROGRESS NOTES
Subjective:      Silvino Caceres is a 39 y.o. female who presents for evaluation of trouble sleeping.  Initially she also stated she was here for possible yeast infection.  I discussed this with her, she says she has no complaints of yeast infection.  She saw Dr. Mann a few months ago and was diagnosed with a yeast infection and treated with fluconazole.  I prescribed her a second pill short time after that.  She notes she took both pills and symptoms resolved.  No more vaginal itching.  No further concerns.    Trouble sleeping.  This is a long-term problem for her.  We had reviewed this at her last visit on 4/26/2019.  At that visit she reported that Seroquel and Remeron were not helping her at all with her sleep.  She had not been into see a therapist for a while and was hesitant to do so.  Referral made at that visit for therapy.  She has now been seeing her therapist Rae for several visits.  Symptoms seem to not be improving.  Referral made to [facility] for psychiatry and further treatment.  Patient continues to have difficulty sleeping.  Her work shift is in the middle of the day, only for a few hours at a time.  No overnight shifts.  She sleeps in her own bed.    Patient Active Problem List   Diagnosis     Trouble in sleeping     Anemia     Hepatitis B     Hyperlipidemia     Vitamin D deficiency     Severe episode of recurrent major depressive disorder, with psychotic features (H)     PTSD (post-traumatic stress disorder)     Persistent depressive disorder with anxious distress, currently moderate       Current Outpatient Medications:      cholecalciferol, vitamin D3, (VITAMIN D3) 2,000 unit Tab, Take 1 tablet (2,000 Units total) by mouth daily., Disp: 100 tablet, Rfl: 1     omeprazole (PRILOSEC) 20 MG capsule, Take 1 capsule (20 mg total) by mouth daily., Disp: 30 capsule, Rfl: 11     azelastine (OPTIVAR) 0.05 % ophthalmic solution, One drop in each eye 1-2 times a day, as needed., Disp: 6 mL, Rfl: 12      hydrocortisone (ANUSOL-HC) 2.5 % rectal cream, Apply topically to affected area BID - no longer than 14 days, Disp: 30 g, Rfl: 1     miconazole (MICATIN) 2 % cream, Apply to affected area 2 times daily, Disp: 15 g, Rfl: 1     mirtazapine (REMERON) 30 MG tablet, Take 1 tablet (30 mg total) by mouth at bedtime., Disp: 30 tablet, Rfl: 5     prazosin (MINIPRESS) 1 MG capsule, Take 1 capsule (1 mg total) by mouth at bedtime., Disp: 30 capsule, Rfl: 3     QUEtiapine (SEROQUEL) 25 MG tablet, Take 2 tablets (50 mg total) by mouth at bedtime., Disp: 30 tablet, Rfl: 1     Objective:     Allergies:  Patient has no known allergies.    Vitals:  Vitals:    06/19/19 1037   BP: 90/62   Pulse: 80   Resp: 16     Body mass index is 22.85 kg/m .    Vital signs reviewed.  General: Patient is alert and oriented x 3, in no apparent distress, appropriately groomed with normal affect  Cardiac: regular rate and rhythm, no murmurs  Pulmonary: lungs clear to auscultation bilaterally, no crackles, rales, rhonchi, or wheezing noted    Assessment and Plan:   1.  Trouble sleeping, PTSD, major depression.  She is not taking anything right now for sleep.  I think it would be worth trying prazosin.  1 mg pills sent to her pharmacy.  I reviewed appropriate use with her.  If she notices any unwanted side effects, or medicine makes symptoms worse, she will stop medicine and contact us.    Continue with regular visits with Rae for therapy.  Also continue with [facility] referral.  Follow-up as needed.    This dictation uses voice recognition software, which may contain typographical errors.

## 2021-05-30 VITALS — WEIGHT: 121.5 LBS

## 2021-05-30 VITALS — WEIGHT: 127 LBS | BODY MASS INDEX: 23.98 KG/M2 | HEIGHT: 61 IN

## 2021-05-30 VITALS — BODY MASS INDEX: 24 KG/M2 | WEIGHT: 127 LBS

## 2021-05-30 NOTE — PROGRESS NOTES
: Mindy Pearl Agency: [employer]    The Care guide met with the patient in the clinic to go over the Welcome folder for [facility]. The patient is a Citizen at this time. She is looking to obtain help with [facility] assistance and to try to get an [facility] Worker. The Care guide then had the patient sign the release for [place] Department of Human Services. The Care guide and patient agreed to the Care guide calling the patient in about a month. Referral for the FRG was sent.    Upcoming Appointments:  7/15/19 at 9:00 with Rae Lentz  7/17/19 at 9:00 with [facility]    Next Outreach: 7/29/19

## 2021-05-30 NOTE — PROGRESS NOTES
: [place] ID #: [id number] Agency: Language Line    The CHW called the patient to go over her goals. The patient stated that she received a letter stating that her SNAP benefits are being increased to $70 at the start of August. The CHW will update the goals and inform the FRG of the status change.      Upcoming Appointments:  7/31/19 at 10:00 with Rae Lentz    Next Outreach: 8/28/19

## 2021-05-30 NOTE — PROGRESS NOTES
: [place] ID: [id number] Agency: Language Line    The Clinic Care Guide called the patient  today at the request of the PCP to discuss possible clinic care coordination enrollment. Clinic care coordination was described to the patient and immediate needs were discussed. The patient agreed to enroll in clinic care coordination. The RN Assessment was scheduled for 7/3/19.    The patient would like assistance with her Medical needs and applying for SNAP.    Referral Reason:  Ensuring [facility] gets established (referred to Volodymyr by therapist). [facility] to help with paperwork and mail.   Improve medication adherence.   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 Want   FRG help re: food support and energy assistance or other financial support.   Decrease insomnia and nightmares. See physician to find new/different medication than Seroquel as patient reports it was unhelpful.   Note: She is planning to move in with a friend soon. She will remain in the [place] area.    Upcoming Appointments:  7/1/19 at 9:00 with Rae Lentz  7/3/19 at 9:00 with CCC RN    Next Outreach: 7/3/19

## 2021-05-30 NOTE — PROGRESS NOTES
"Mental Health Visit Note    06/03/2019    Start time: 2:10pm    Stop Time: 2:58pm   Session # 1    Session Type: Patient is presenting for an Individual session.    Silvino Caceres is a 39 y.o. female is being seen today for    Chief Complaint   Patient presents with      Follow Up     Patient presented for follow up psychotherapy to address coping with depressed mood and social isolation.   .     New symptoms or complaints: None    Functional Impairment:   Personal: 4  Family: 3  Work: 3  Social:4    Clinical assessment of mental status:   Grooming: Well groomed  Attire: Appropriate  Age: Appears Stated  Behavior Towards Examiner: Guarded/Evasive  Motor Activity: Within normal   Eye Contact: Appropriate  Mood: Depressed  Affect: Flat  Speech/Language: Within normal  Attention: Distractible  Concentration: Brief  Thought Process: Within normal  Thought Content: Hallucinations: Within noraml  Delusions: Within normal  Orientation: X 3  Memory: No Evidence of Impairment  Judgement: No Evidence of Impairment  Estimated Intelligence: Below Average  Demonstrated Insight: Diminished  Fund of Knowledge: adequate        Suicidal/Homicidal Ideation present: None Reported This Session. Denies SI/ HI.       Patient's impression of their current status: \"I feel stressed because of my health.\" Sleeping about 4 hours/night. A lot of nightmares, see scary things. Stopped attending psychiatrist and taking Seroquel- \"didn't really help.\" Feeling tired most days. Hasn't felt motivated to look for jobs, although would like to work more. Not attending English classes anymore. Feels she can't work more than 3 days per week or she will get too tired. She reports financial stress.     Therapist impression of patients current state: Patient presented for follow up individual psychotherapy session. A professional Yareli , Veronique WORTHY, was present for the visit due to the language complexity.   She continues to present with persistent " depressed mood and flat affect. She continues to be somewhat guarded in session. [facility] would help with paperwork, mail and employment goal- referral through [employer]. Patient would also benefit from [facility] consult for financial support, food and energy assistance. Her fatigue is a somatic manifestation of her depression. Patient to continue to work on taking action to help improve depression symptoms- increase engagement in activities and socialization. At this time, poor social, occupational and emotional functioning due to persistent depressive disorder.  Patient and therapist updated treatment plan goals. View  Treatment plan document..      Type of psychotherapeutic technique provided: Insight oriented, Client centered and Solution-focused, motivational interviewing    Progress toward short term goals:Poor progress, poor sleep, isolating, financial stress, stressed because of health.     Review of long term goals: Treatment Plan updated. Effective 6/03/2019-09/03/2019.    Diagnosis:   1. Persistent depressive disorder with anxious distress, currently moderate    2. PTSD (post-traumatic stress disorder)        Plan and Follow up: Patient is scheduled for follow up psychotherapy on 6/17/19    Discharge Criteria/Planning: Patient will continue with follow-up until therapy can be discontinued without return of signs and symptoms.    Rae Lentz 06/03/2019

## 2021-05-30 NOTE — PROGRESS NOTES
Programs Applying For: [facility] Incentive Targeting   County: [place]   Case #:  [facility] Worker:       Outreach:   7/29/19: Message received from CHW. [facility] received updated income and SNAP benefit was increased to $70/m. No further needs from [facility].   7/17/19: FRG received paystub and faxed to [facility], Pt did not sign JAEL. FRG will reach out to pt next week to have her sign JAEL.   7/16/19: FRG called pt, she will drop off paystubs at clinic this week and sign forms waiting at  [facility].   7/8/19: FRG called patient regarding referral to apply for [facility] benefits (energy assistance program has ended). Patient currently has SNAP, $15/m. She would like to report an income change. 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 Patient will need to sign change report form and JAEL. FRG will have this ready for patient at .     Health Insurance Information:   [employer] [place]     Referral:   food support and energy assistance or other financial support.

## 2021-05-30 NOTE — PROGRESS NOTES
My Clinic Care Coordination Care Plan    [facility]  [address] [place]  [address]  [phone]    My Preferred Method of Contact:  Phone: [phone]    My Primary/Preferred Language:  Yareli    Preferred Learning Style:  Face to face discussion, Pictures/Diagrams and Hands on teaching    Emergency Contact: Extended Emergency Contact Information  Primary Emergency Contact: [facility]  Address: [place] [address] [facility]  Home Phone: [phone]  Relation: Cousin  Secondary Emergency Contact: NONE,PER PT   [facility]  Relation: Declined     PCP:  Maggie Rowe MD  Specialists:    Care Team            Maggie Rowe MD   [phone] PCP - General, Family Medicine    Rae Lentz LICSW   [phone] Licensed Clinical     psychotherapist    Lilia Brock, [facility]   [phone] Community Health Worker, Primary Care - CC    Venus Luu   [phone] , Primary Care - CC    Reanna Segundo, RN   [phone] Lead Care Coordinator, Primary Care - CC          Hospitalizations and/or ED Visits  Most Recent: N/A     Previous:  N/A  Reason:  N/A    Concerns regarding my health is receiving an Community Health Worker and to receive county benefits.    Advanced Directive/Living Will: The patient was given information regarding Adanced Directives/Living Will      Ten elected to enroll in the [facility] Care Coordination.  Ten was given a copy of the Clinic Care Coordination brochure and full description of how to access their care team both during clinic hours and after hours.   My Care Guide's Name and Phone Number:  Orion Brock at [phone]  The Care Guide and myself agreed to be in contact monthly.      Medication Update  The patient's medication list is up to date per the PCP.    Health Maintenance and Immunization Update  The patient's preventive health screenings and immunizations are up  to date per the PCP.    Emergency Plan  4 Steps for Eating Healthier    Changing the way you eat can improve your health. It can lower your cholesterol and blood pressure, and help you stay at a healthy weight. Your diet doesn t have to be bland and boring to be healthy. Just watch your calories and follow these steps:  Step 1. Eat fewer unhealthy fats    Choose more fish and lean meats instead of fatty cuts of meat.    Skip butter and lard, and use less margarine.    Pass on foods that have palm, coconut, or hydrogenated oils.    Eat fewer high-fat dairy foods like cheese, ice cream, and whole milk.    Get a heart-healthy cookbook and try some low-fat recipes.  Step 2. Go light on salt    Keep the saltshaker off the table.    Limit high-salt ingredients, such as soy sauce, bouillon, and garlic salt.    Instead of adding salt when cooking, season your food with herbs and flavorings. Try lemon, garlic, and onion, or salt-free herb seasonings.    Limit convenience foods, such as boxed or canned foods and restaurant food.    Read food labels and choose lower-sodium options.  Step 3. Limit sugar    Pause before you add sugars to pancakes, cereal, coffee, or tea. This includes white and brown table sugar, syrup, honey, and molasses. Cut your usual amount by half.    Use non-sugar sweeteners. Stevia, aspartame, and sucralose can satisfy a sweet tooth without adding calories.    Swap out sugar-filled soda and other drinks. Buy sugar-free or low-calorie beverages. Remember water is always the best choice.    Read labels and choose foods with less added sugar. Keep in mind that dairy foods and foods with fruit will have some natural sugar.    Cut the sugar in recipes by 1/3 to 1/2. Boost the flavor with extracts like almond, vanilla, or orange. Or add spices such as cinnamon or nutmeg.  Step 4. Eat more fiber    Eat fresh fruits and vegetables every day.    Boost your diet with whole grains. Go for oats, whole-grain rice,  and bran.    Add beans and lentils to your meals.    Drink more water to match your fiber increase to help prevent constipation.  Date Last Reviewed: 6/1/2017 2000-2018 [employer]. [address] [place] [address], [place] [address]. All rights reserved. This information is not intended as a substitute for professional medical care. Always follow your healthcare professional's instructions.        Tips forTakingMedicine  It s easy to forget to take your medicine, especially when you take a lot of pills. But, to get the best results from medicines, always take them as directed. The tips on these pages can help you keep track.  Staying on schedule  Every medicine has a different purpose. So, each one needs to be taken as prescribed. Don t skip pills or stop taking a medicine, even when you feel fine. To stay on track try to:    Take your medicine at set times. You could take it each morning with breakfast or right before you go to bed. Some medicines may need to be taken at certain times of the day, or with food. Ask your doctor if this is the case for any of your medicines.    Find ways to remind yourself to take medicine. Use a pillbox or organize pills for the week. Set your watch or cell phone alarm to go off when you re supposed to take your medicine. Or, put a note on the bathroom mirror to remind yourself.    Have your prescriptions refilled while you still have plenty of pills left. Keep in mind that certain suppliers, such as mail order pharmacies, may take longer to fill prescriptions.    When traveling, keep all medicines in your carry-on bag. This way you ll have them in case you and your checked luggage get . Also, bring copies of each of your prescriptions when you travel.  Safety tips  Read the warning labels and usage instructions for each medicine you take. Also, keep these safety tips in mind:    Get help organizing your pills if you need it. Taking more than one medicine can be  confusing. A family member or friend can help prevent you from making a mistake that could be dangerous to your health.    Fill all your prescriptions at the same drug store. This way, your records are all in one place.    Ask your pharmacist or doctor for a  fact sheet  or other patient information when you start a new medicine.    Tell your doctor and pharmacist if you have allergies to any medicine.    Don t split your pills to save money. Talk to your doctor if you re having trouble paying for your medicine.    Never share medicine with anyone.    Ask your pharmacy how you should dispose of old or  medicine.    Give a copy of your medicine list to a family member or close friend. Hold copies of each other s lists in case of emergency.    Store medicines in a cool, dry, dark place - not in a steamy bathroom.    Make sure you tell your healthcare providers if you are taking any other supplements or drugs over-the-counter.  If you have side effects  Some medicines can cause side effects, such as nausea or dizziness. Tell your doctor if you have any side effects. He or she may change the dosage or schedule to reduce effects. Be sure to keep taking your medicine as directed, and always talk to your healthcare team about how you feel. Your feedback will help the doctor find the best medicine plan for you.  Know the Medications You reTaking   You should know certain details about your medications. This helps you take them correctly and safely. For each medicine, ask your doctor or pharmacist the questions below. Write down the answers so you don t forget. Then fill in the information on your medication list. Also, ask about anything you don t understand or that seems wrong. For instance, if you get a refill and the pills don t look like the ones from last time, talk to the pharmacist before taking them.  Questions to ask     What is the medication's name? Find out the brand name as well as the generic name, if  any.    Why am I taking this? What does it do? How fast will it work?    How often should I take this? At what time of day?    How much of the medication (what dosage) should I take? How many pills is that?    What should I do if I miss a dose? What are some of the symptoms that may occur if I miss a dose?    Should I expect any side effects from this medication? What should I do if I have them?    Do I follow any special instructions while taking this? Are there any activities, foods, or other medicines I should avoid while taking this?    How long should I keep taking this? When I run out, should I order more?    How often should I come in to have this medication monitored?  Beware of medication interactions  Vitamins, herbal supplements, and some over-the-counter drugs can be dangerous to take if you use heart medications. So tell your doctor about all products you re taking. This includes even simple remedies for headaches, allergies, colds, or constipation. Show your medication list to the pharmacist every time you buy prescription or over-the-counter medication. They can tell you which drugs to avoid. Also, drinking alcohol while taking heart medication can be dangerous.  Depression  Everyone feels down at times. The blues are a natural part of life. But an unhappy period that s intense or lasts for more than a couple of weeks can be a sign of depression. Depression is a serious illness. It can disrupt the lives of family and friends. If you know someone you think may be depressed, find out what you can do to help.     Know the serious signals  Warning signals for suicide include:    Threats or talk of suicide    Statements such as  I won t be a problem much longer  or  Nothing matters     Giving away possessions or making a will or  arrangements    Buying a gun or other weapon    Sudden, unexplained cheerfulness or calm after a period of depression  If you notice any of these signs, get help right  away. Call a health care professional, mental health clinic, or suicide hotline and ask what action to take. In an emergency, don t hesitate to call the police.     [facility] Mental Health Crisis Lines:  [facility] [phone]  [facility] [phone]  [facility] [phone]  [facility] [phone]  [facility], Adults [phone]  [facility], Children [phone]  [facility], Adults [phone]  [facility], Children [phone].  Emergency Plan Recommendation:     When to Use the Emergency Department (ED)  An emergency means you could die if you don t get care quickly. Or you could be hurt permanently (disabled). Read below to know when to use -- and when not to use -- an emergency department (also called ED).     Dangers to your life  Here are examples of emergencies. These need immediate care:  A hard time breathing  Severe chest pain  Choking  Severe bleeding  Suddenly not able to move or speak  Blacking out (fainting)`  Poisoning     Dangers of permanent injuries  Here are other emergencies. These also need immediate care:  Deep cuts or severe burns  Broken bones, or sudden severe pain and swelling in a joint     When it s an emergency  If you have an emergency, follow these steps:     1. Go to the nearest emergency department  If you can, go to the hospital ED closest to you right away.  If you cannot get there right away, or if it is not safe to take yourself, call 911 or your police emergency number.  2. Call your primary care doctor  Tell your doctor about the emergency. Call within 24 hours of going to the ED.  If you cannot call, have someone call for you.  Go to your doctor (not the ED) for any follow-up care.     When it s not an emergency  If a problem is not an emergency, follow these steps:     1. Call your primary care doctor  If you don t know the name of your doctor, call your health plan.  If you cannot call, have someone call for you.  2. Follow  instructions  Your doctor will tell you what you should do.  You may be told to see your doctor right away. You may be told to go to the ED. Or you may be told to go to an urgent care center.  Follow your doctor s advice.  Handwashing: Tips for Patients, Family, and Friends  Germs are everywhere around us. Normally, we live with germs without getting sick. In certain cases, harmful germs cause us to get sick with an infection. Or we can spread harmful germs to others and cause them to get sick. Keeping your hands clean is the best way to prevent getting or spreading germs that cause infection. Wash your hands with soap and water or use an alcohol-based hand .  When to clean your hands: For patients  In the hospital or in your home, you can come in contact with many harmful germs. To help prevent infection, wash your hands often, especially:    After using the bathroom    Before and after eating    After coughing or sneezing    After using a tissue    After touching or changing a dressing or bandage    After touching any object or surface that may be contaminated    After touching an animal during a pet therapy session (hospital)    After touching an animal, cleaning up after a pet, or preparing food for pets (home)  If you don t have access to soap and water, use an alcohol-based hand gel containing at least 60% alcohol. These products kill most germs and are easy to use. But use soap and water (not alcohol-based hand gel) if your hands are visibly dirty.  When to clean your hands: For family and friends  When visiting or caring for a loved one, washing your hands or using an alcohol-based hand  can help stop germs from spreading. Wash your hands:    Before entering and after leaving the patient s room    As soon as you remove gloves or other protective clothing    After changing a dressing or bandage    After any contact with blood or other body fluids    After touching or changing the patient s bed  linen or towels    After touching an animal during a pet therapy session (hospital)    After touching an animal, cleaning up after a pet, or preparing food for pets (home)  Many hospitals have sinks or gel dispensers right outside patient rooms. If not, carry a bottle of alcohol-based hand gel with you, and use it every time you visit. Use soap and water (not alcohol-based hand gel) if your hands are visibly dirty.  Tips for good handwashing  Here are some suggestions to follow:    Use warm water and plenty of soap. Work up a good lather.    Clean the whole hand, including under your nails, between your fingers, and up the wrists.    Wash for at least 15 to 20 seconds. Don t just wipe. Scrub well.    Rinse, letting the water run down your fingers, not up your wrists.    Dry your hands well. Use a paper towel to turn off the faucet and open the door.  Time matters  The longer you wash your hands, the more germs you ll remove. Most people wash their hands for 6 to 7 seconds. But at least 15 seconds are needed to remove germs. Singing Happy Birthday or the WiiiWaaat Song are examples of how long 15 seconds would be. To protect yourself and others from infection, washing for 30 seconds is best.  How to use an alcohol-based hand   Alcohol-based hand  may kill more germs than soap and water. Use them when your hands aren t visibly dirty. For best results, follow these steps:    Choose a gel or spray that contains at least 60 percent alcohol. Products with less alcohol may not kill germs.    Spread about a tablespoon of  in the palm of one hand.    Rub your hands together briskly, cleaning the backs of your hands, the palms, between your fingers, and up the wrists.    Rub until the  is gone, and your hands are completely dry.       Patient/Caregiver understanding: Patient verbalized understanding, engaged in AIDET communication behavior during encounter.    All [facility] clinic patients have  access to a Nurse 24 hours a day, 7 days a week.  If you have questions or want advice from a Nurse, please know [facility] is here for you.  You can call your clinic and they will connect you or you can call [facility] at [phone].  [facility] also has Walk In Care clinics in multiple locations.  Call the number listed above for more information about our Walk In Care clinics or visit the [facility] website at [email or web address].    Patient Support  I will ask my emergency contact for help in supporting me in these goals.  Relationship to patient: Cousin  Home # : [phone] , best time to call anytime.

## 2021-05-30 NOTE — PROGRESS NOTES
"  Mental Health Visit Note    7/17/2019    Start time: 8:20am    Stop Time: 8:55am   Session # 4    Session Type: Patient is presenting for an Individual session.    Silvino Caceres is a 39 y.o. female is being seen today for    Chief Complaint   Patient presents with      Follow Up     follow up session to address coping with fatigue, low motivation and isolating from others       New symptoms or complaints: None    Functional Impairment:   Personal: 4  Family: 3  Work: 3  Social:3    Clinical assessment of mental status: Reviewed. MSE is current effective 7/17/19  Grooming: Well groomed  Attire: Appropriate  Age: Appears Stated  Behavior Towards Examiner: Guarded/Evasive  Motor Activity: Within normal   Eye Contact: Appropriate  Mood: Depressed  Affect: Congruent w/content of speech and Flat.  Speech/Language: Within normal and Delayed/Hesitant  Attention: Distractible  Concentration: Brief  Thought Process: Within normal  Thought Content: Hallucinations: Within noraml  Delusions: Within normal  Orientation: X 3  Memory: Impairment  Judgement: No Evidence of Impairment  Estimated Intelligence: Below Average  Demonstrated Insight: Diminished  Fund of Knowledge: adequate        Suicidal/Homicidal Ideation present: Yes: continued passive wish to be dead. Doesn't enjoy life- feels hopeless.  Patient denies any active plans, means or intent. No self- harm. No preparatory behaviors.   Reviewed crisis plan to call 911, go to nearest ER, or contact other supports/services. Provided printout of contact information for [facility] crisis, suicide hotline, and  urgent care.     Patient's impression of their current status: Patient reports she is \"so so.\" Low energy, tired, problems sleeping. She is getting about 2-3 hours of sleep per night. Denies thoughts in her mind keeping her awake. \"I just don't feel like sleeping.\" She would like to sleep, but her mind and body doesn't want to. She doesn't know why or about any thoughts in " "her head. She does \"nothing\" in the moment, just lays there. \"When there is noise, I can't go to sleep.\" Denies fear. Easily awoken by sounds such as cars. Taking medication, but still don't sleep.  She recalls she used to sleep a lot and didn't want to wake up; therefore, this is different from her past experiencing. This change occurred in 2003 after she took the medications and got sick.  She hasn't received any outreach from [facility] since therapist made referral a month ago (long wait list at [place]).    Therapist impression of patients current state: Patient presented for follow up individual psychotherapy visit. Session started late due to trying to find an available . A professional Yareli , Ravi Alvares (#[id number]) from [place], was present for the visit due to the language complexity. She appears to lack awareness to thoughts in her mind. Changes in sleep appear to be correlated to her onset of depression.  Patient continues to have chronic depressed mood. She continues to be somewhat guarded in session and appears somewhat uncomfortable with the therapeutic process. We continue to openly discuss how to best use therapy in order to manage depression and PTSD symptoms. She lacks understanding of the formal concept of mental health, much of which is due to her cultural background. Her depression is persistent and therefore, therapy treatment is helpful to present further decompensation and maintain functioning in daily life. Therapy has seemed to help with managing history of SI. She continues to lack motivation in order to be an agent for making changes that would improve her mood.  She may benefit from PharmD consult as she continues to feel like her medications aren't effective.       Type of psychotherapeutic technique provided: Insight oriented and Client centered, motivational interviewing    Progress toward short term goals:Poor progress, continues to have poor sleep- only 2-3 hours " per night. Trying to implement sleep hygiene skills. Reports increased socialization and communication with friends and going fishing.    Prior to next session, patient will meet with [facility]. She will continue to socialize with friends and engage in enjoyable activities daily.    Review of long term goals: Long-term goals reviewed recommendations and treatment plan.  Effective 06/03/2019-09/03/2019.    Diagnosis:   1. PTSD (post-traumatic stress disorder)    2. Persistent depressive disorder with anxious distress, currently moderate        Plan and Follow up: Patient is scheduled for follow up psychotherapy on 7/31/19.      Discharge Criteria/Planning: Patient will continue with follow-up until therapy can be discontinued without return of signs and symptoms.    Rae Lentz [facility] 7/17/2019

## 2021-05-30 NOTE — PROGRESS NOTES
[employer]    Open Encountered today.  Episodes created, care management status validated and encounters linked

## 2021-05-30 NOTE — PROGRESS NOTES
"  Mental Health Visit Note    7/1/2019    Start time: 9:23am    Stop Time: 10:04am   Session # 3    Session Type: Patient is presenting for an Individual session.    Silvino Caceres is a 39 y.o. female is being seen today for    Chief Complaint   Patient presents with      Follow Up     Patient presented for follow up psychotherapy to address coping with depressed mood and low energy.         New symptoms or complaints: None    Functional Impairment:   Personal: 4  Family: 3  Work: 3  Social:3    Clinical assessment of mental status: Reviewed. MSE is current effective 7/1/19  Grooming: Well groomed  Attire: Appropriate  Age: Appears Stated  Behavior Towards Examiner: Cooperative, guarded at times.  Motor Activity: Within normal   Eye Contact: Appropriate  Mood: Depressed  Affect: Congruent w/content of speech. Difficult to read. Smile/smirk at times  Speech/Language: Within normal and Delayed/Hesitant  Attention: Distractible  Concentration: Brief  Thought Process: Within normal  Thought Content: Hallucinations: Within noraml  Delusions: Within normal  Orientation: X 3  Memory: Impairment  Judgement: No Evidence of Impairment  Estimated Intelligence: Below Average  Demonstrated Insight: Diminished  Fund of Knowledge: adequate        Suicidal/Homicidal Ideation present: Yes: passive SI- wish to be dead as feels like life is a struggle. Feelings of hopelessness. No active plans, means or intent. No self- harm. No preparatory behaviors.   Reviewed crisis plan to call 911, go to nearest ER, or contact other supports/services. Provided printout of contact information for [facility] crisis, suicide hotline, and  urgent care.     Patient's impression of their current status: Patient reports feeling the \"same.\" Her perception is that she has been cursed. She thinks someone cursed her whole family, which is why she feels the way she does.   Sleep: continues to be poor most nights. Difficulty falling asleep. Typically 2am-6am " "sleep. Has tried to implement some sleep hygiene tips such as leaving bed when unable to sleep and going to a calm, quiet space like the living room for a bit. Also started taking new medication from physician at clinic- not noticing any difference. Still having nightmares.  Depressed mood: Daily depressed mood.   Pain: \"whole body pain and muscle aches\" when standing up a lot (not history of injury to arms, backs or legs)  Energy: Low energy. Body weakness. Tired. shortness of breath.   Engagement in activities:Isolating. \"don't want to go out.\" Not interested in socializing or going to school. Mostly stay at home and lay down during the day.       Therapist impression of patients current state: Patient presented for follow up individual psychotherapy visit. A professional Yareli  from [facility], Peggy [facility], was present for the visit due to the language complexity. Patient presents with depressed mood. She lacks motivation. She feels stuck and seems to believe there is not way to heal herself or take action to make improvements in her symptoms. Many of her symptoms appear to be mental health related. She endorses many somatic symptoms with no history of physical injury. Her fatigue related symptoms are likely fatigue due to depressed mood. She continues to have insomnia, even with medications. Developed insight into the difference in socializing and energy when living in [facility] versus [place]. She has shared in the past how she was social back home. Now, she has no desire to make friends with people. Developed insight into how her staying home is related to an internal factor, feeling depressed, lacking interest/desire to socialize.   Prior to next session, she will continue to work on sleep hygiene. She will also try to take more action to be active or do things that might improve her mood, such as fishing.      Type of psychotherapeutic technique provided: Insight oriented and Client centered, " motivational interviewing, sleep hygiene.    Progress toward short term goals:Progress as expected, attendced follow up appointment with provider re: prescribed medications for mental health.  Has also started connecting with [facility] team.     Review of long term goals: Not done at today's visit. Effective 06/03/2019-09/03/2019.    Diagnosis:   1. Persistent depressive disorder with anxious distress, currently moderate    2. PTSD (post-traumatic stress disorder)        Plan and Follow up: Patient is scheduled for follow up psychotherapy on 7/15/19.      Discharge Criteria/Planning: Patient will continue with follow-up until therapy can be discontinued without return of signs and symptoms.    Rae Lentz [facility] 7/1/2019

## 2021-05-30 NOTE — PROGRESS NOTES
"RN [facility] met with patient and  in person today to introduce self and clinic care coordination.   Community Health Worker joined visit and gave intro folder to patient  Patient primary concern was request for assistance in review paperwork and application for [facility] resources and services.         Medication Management: Patient self manage    Functional Status: Indepentant  With ADL        Living Situation: Home - rent   Live with friends   No family   [place] Citizen - 2016       Diet: \"Fair\"  Morning - water with small amount of food as needed for mediction  Water - 2-3 large bottles of water a day  Tea occasionally  Alcohol - no   Smoking - no   Exercise: walk every day for about 15-20 days  Sleep:  1-2 am - 7 am - wake up tired - no naps - dx with insomnia     Transportation: walked to today visit   No access to car    Has used public transportation in past  Psychosocial: Patient notes that she does have friends who could take her to medical visit if needed.      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  I will meet with  on Clinic care Coordination team to review options and paperwork  2.  I will report status update with Community Health Worker      Date goal set: 07/03/19          I want to apply for Financial assistance within 1-2 months  (pt-stated)      Action steps to achieve this goal  1.  I will meet with  on clinic care coordination " and follow up with Financial Care guide if recommended  2.  I will update Community Health Worker with status and if any additional resources or questions    Date goal set:  07/03/19              Clinic Care Coordination RN Assessed Needs  Little interest or pleasure in doing things: Nearly every day  Feeling down, depressed, or hopeless: Nearly every day  Trouble falling or staying asleep, or sleeping too much: Nearly every day  Feeling tired or having little energy: Nearly every day  Poor appetite or overeating: Several days  Feeling bad about yourself - or that you are a failure or have let yourself or your family down: Several days  Trouble concentrating on things, such as reading the newspaper or watching television: Nearly every day  Moving or speaking so slowly that other people could have noticed. Or the opposite - being so fidgety or restless that you have been moving around a lot more than usual: Several days  Thoughts that you would be better off dead, or of hurting yourself in some way: Several days  PHQ-9 Total Score: 19  If you checked off any problems, how difficult have these problems made it for you to do your work, take care of things at home, or get along with other people?: Not difficult at all  Depression Follow-up Plan: under care of mental health counselor    Patient Centered Assessment Method-PCAM TOTAL SCORE: 27 (7/3/2019  9:40 AM)    Level 2:  A score of 25-36 indicates that the patient has a moderate initial need for RN or SW intervention at the discretion of the .  The RN will add this patient to her panel and follow closely in partnership with the care guide until stable.  She will reach out to the care guide for support in care coordination needs and graduate the patient to standard care guide outreach when appropriate.      PCAM (Patient Centered Assessment Method)   HEALTH AND WELL-BEING  RN Assessment: Physical Health Needs: No identified areas of uncertainty or problems  "already being investigated  RN Assessment: Physical Health Problems: Mild impact on mental well-being e.g. \"feeling fed-up\", \"reduced enjoyment\"  RN Assessment:Other Mental Well-Being Concern: Mod to severe problems that interfere with function  Lifestyle/Habit Concerns: Diet  RN Assessment: Lifestyle Behaviors: No identified areas of concern  SOCIAL ENVIRONMENT  RN Assessment: Home Environment: Consistently safe, supportive, stable, no identified problems  RN Assessment: Daily Activites: Little participation, lonely and socially isolated  RN Assessment: Social Network: Restricted participation with some degree of social isolation  RN Assessment: Financial Resources: Financially insecure, some resource challenges  HEALTH LITERACY AND COMMUNICATION  RN Assessment: Health Literacy: Reasonable to good understanding but do not feel able to engage with advice at this time  Engagement Concerns: Some difficulties in communication with or without moderate barriers  RN Assessment: Engagement: Adequate communication, with or without minor barriers  Barriers to Compliance with Medical Recommendations: Language, Transportation, Financial  SERVICE COORDINATION  Other Services: Other care/services in place with some coordination barriers  Coordination of Services: Required care/services in place and adequately coordinated  PCAM TOTAL SCORE: 27      Emergency Plan  4 Steps for Eating Healthier    Changing the way you eat can improve your health. It can lower your cholesterol and blood pressure, and help you stay at a healthy weight. Your diet doesn t have to be bland and boring to be healthy. Just watch your calories and follow these steps:  Step 1. Eat fewer unhealthy fats    Choose more fish and lean meats instead of fatty cuts of meat.    Skip butter and lard, and use less margarine.    Pass on foods that have palm, coconut, or hydrogenated oils.    Eat fewer high-fat dairy foods like cheese, ice cream, and whole milk.    Get a " heart-healthy cookbook and try some low-fat recipes.  Step 2. Go light on salt    Keep the saltshaker off the table.    Limit high-salt ingredients, such as soy sauce, bouillon, and garlic salt.    Instead of adding salt when cooking, season your food with herbs and flavorings. Try lemon, garlic, and onion, or salt-free herb seasonings.    Limit convenience foods, such as boxed or canned foods and restaurant food.    Read food labels and choose lower-sodium options.  Step 3. Limit sugar    Pause before you add sugars to pancakes, cereal, coffee, or tea. This includes white and brown table sugar, syrup, honey, and molasses. Cut your usual amount by half.    Use non-sugar sweeteners. Stevia, aspartame, and sucralose can satisfy a sweet tooth without adding calories.    Swap out sugar-filled soda and other drinks. Buy sugar-free or low-calorie beverages. Remember water is always the best choice.    Read labels and choose foods with less added sugar. Keep in mind that dairy foods and foods with fruit will have some natural sugar.    Cut the sugar in recipes by 1/3 to 1/2. Boost the flavor with extracts like almond, vanilla, or orange. Or add spices such as cinnamon or nutmeg.  Step 4. Eat more fiber    Eat fresh fruits and vegetables every day.    Boost your diet with whole grains. Go for oats, whole-grain rice, and bran.    Add beans and lentils to your meals.    Drink more water to match your fiber increase to help prevent constipation.  Date Last Reviewed: 6/1/2017 2000-2018 [employer]. [address] [place] [address], [place] [address]. All rights reserved. This information is not intended as a substitute for professional medical care. Always follow your healthcare professional's instructions.       Tips forTakingMedicine  It s easy to forget to take your medicine, especially when you take a lot of pills. But, to get the best results from medicines, always take them as directed. The tips on these pages can  help you keep track.  Staying on schedule  Every medicine has a different purpose. So, each one needs to be taken as prescribed. Don t skip pills or stop taking a medicine, even when you feel fine. To stay on track try to:  Take your medicine at set times. You could take it each morning with breakfast or right before you go to bed. Some medicines may need to be taken at certain times of the day, or with food. Ask your doctor if this is the case for any of your medicines.  Find ways to remind yourself to take medicine. Use a pillbox or organize pills for the week. Set your watch or cell phone alarm to go off when you re supposed to take your medicine. Or, put a note on the bathroom mirror to remind yourself.  Have your prescriptions refilled while you still have plenty of pills left. Keep in mind that certain suppliers, such as mail order pharmacies, may take longer to fill prescriptions.  When traveling, keep all medicines in your carry-on bag. This way you ll have them in case you and your checked luggage get . Also, bring copies of each of your prescriptions when you travel.  Safety tips  Read the warning labels and usage instructions for each medicine you take. Also, keep these safety tips in mind:  Get help organizing your pills if you need it. Taking more than one medicine can be confusing. A family member or friend can help prevent you from making a mistake that could be dangerous to your health.  Fill all your prescriptions at the same drug store. This way, your records are all in one place.  Ask your pharmacist or doctor for a  fact sheet  or other patient information when you start a new medicine.  Tell your doctor and pharmacist if you have allergies to any medicine.  Don t split your pills to save money. Talk to your doctor if you re having trouble paying for your medicine.  Never share medicine with anyone.  Ask your pharmacy how you should dispose of old or  medicine.  Give a copy of  your medicine list to a family member or close friend. Hold copies of each other s lists in case of emergency.  Store medicines in a cool, dry, dark place - not in a steamy bathroom.  Make sure you tell your healthcare providers if you are taking any other supplements or drugs over-the-counter.  If you have side effects  Some medicines can cause side effects, such as nausea or dizziness. Tell your doctor if you have any side effects. He or she may change the dosage or schedule to reduce effects. Be sure to keep taking your medicine as directed, and always talk to your healthcare team about how you feel. Your feedback will help the doctor find the best medicine plan for you.  Know the Medications You reTaking   You should know certain details about your medications. This helps you take them correctly and safely. For each medicine, ask your doctor or pharmacist the questions below. Write down the answers so you don t forget. Then fill in the information on your medication list. Also, ask about anything you don t understand or that seems wrong. For instance, if you get a refill and the pills don t look like the ones from last time, talk to the pharmacist before taking them.  Questions to ask   What is the medication's name? Find out the brand name as well as the generic name, if any.  Why am I taking this? What does it do? How fast will it work?  How often should I take this? At what time of day?  How much of the medication (what dosage) should I take? How many pills is that?  What should I do if I miss a dose? What are some of the symptoms that may occur if I miss a dose?  Should I expect any side effects from this medication? What should I do if I have them?  Do I follow any special instructions while taking this? Are there any activities, foods, or other medicines I should avoid while taking this?  How long should I keep taking this? When I run out, should I order more?  How often should I come in to have this  medication monitored?  Beware of medication interactions  Vitamins, herbal supplements, and some over-the-counter drugs can be dangerous to take if you use heart medications. So tell your doctor about all products you re taking. This includes even simple remedies for headaches, allergies, colds, or constipation. Show your medication list to the pharmacist every time you buy prescription or over-the-counter medication. They can tell you which drugs to avoid. Also, drinking alcohol while taking heart medication can be dangerous.  Depression  Everyone feels down at times. The blues are a natural part of life. But an unhappy period that s intense or lasts for more than a couple of weeks can be a sign of depression. Depression is a serious illness. It can disrupt the lives of family and friends. If you know someone you think may be depressed, find out what you can do to help.    Know the serious signals  Warning signals for suicide include:    Threats or talk of suicide    Statements such as  I won t be a problem much longer  or  Nothing matters     Giving away possessions or making a will or  arrangements    Buying a gun or other weapon    Sudden, unexplained cheerfulness or calm after a period of depression  If you notice any of these signs, get help right away. Call a health care professional, mental health clinic, or suicide hotline and ask what action to take. In an emergency, don t hesitate to call the police.    [facility] Mental Health Crisis Lines:  [facility] [phone]  [facility] [phone]  [facility] [phone]  [facility] [phone]  [facility], Adults [phone]  [facility], Children [phone]  [facility], Adults [phone]  [facility], Children [phone].  Emergency Plan Recommendation:    When to Use the Emergency Department (ED)  An emergency means you could die if you don t get care quickly. Or you could be hurt permanently (disabled).  Read below to know when to use -- and when not to use -- an emergency department (also called ED).    Dangers to your life  Here are examples of emergencies. These need immediate care:  A hard time breathing  Severe chest pain  Choking  Severe bleeding  Suddenly not able to move or speak  Blacking out (fainting)`  Poisoning    Dangers of permanent injuries  Here are other emergencies. These also need immediate care:  Deep cuts or severe burns  Broken bones, or sudden severe pain and swelling in a joint    When it s an emergency  If you have an emergency, follow these steps:    1. Go to the nearest emergency department  If you can, go to the hospital ED closest to you right away.  If you cannot get there right away, or if it is not safe to take yourself, call 911 or your police emergency number.  2. Call your primary care doctor  Tell your doctor about the emergency. Call within 24 hours of going to the ED.  If you cannot call, have someone call for you.  Go to your doctor (not the ED) for any follow-up care.    When it s not an emergency  If a problem is not an emergency, follow these steps:    1. Call your primary care doctor  If you don t know the name of your doctor, call your health plan.  If you cannot call, have someone call for you.  2. Follow instructions  Your doctor will tell you what you should do.  You may be told to see your doctor right away. You may be told to go to the ED. Or you may be told to go to an urgent care center.  Follow your doctor s advice.  Handwashing: Tips for Patients, Family, and Friends  Germs are everywhere around us. Normally, we live with germs without getting sick. In certain cases, harmful germs cause us to get sick with an infection. Or we can spread harmful germs to others and cause them to get sick. Keeping your hands clean is the best way to prevent getting or spreading germs that cause infection. Wash your hands with soap and water or use an alcohol-based hand  .  When to clean your hands: For patients  In the hospital or in your home, you can come in contact with many harmful germs. To help prevent infection, wash your hands often, especially:  After using the bathroom  Before and after eating  After coughing or sneezing  After using a tissue  After touching or changing a dressing or bandage  After touching any object or surface that may be contaminated  After touching an animal during a pet therapy session (hospital)  After touching an animal, cleaning up after a pet, or preparing food for pets (home)  If you don t have access to soap and water, use an alcohol-based hand gel containing at least 60% alcohol. These products kill most germs and are easy to use. But use soap and water (not alcohol-based hand gel) if your hands are visibly dirty.  When to clean your hands: For family and friends  When visiting or caring for a loved one, washing your hands or using an alcohol-based hand  can help stop germs from spreading. Wash your hands:  Before entering and after leaving the patient s room  As soon as you remove gloves or other protective clothing  After changing a dressing or bandage  After any contact with blood or other body fluids  After touching or changing the patient s bed linen or towels  After touching an animal during a pet therapy session (hospital)  After touching an animal, cleaning up after a pet, or preparing food for pets (home)  Many hospitals have sinks or gel dispensers right outside patient rooms. If not, carry a bottle of alcohol-based hand gel with you, and use it every time you visit. Use soap and water (not alcohol-based hand gel) if your hands are visibly dirty.  Tips for good handwashing  Here are some suggestions to follow:  Use warm water and plenty of soap. Work up a good lather.  Clean the whole hand, including under your nails, between your fingers, and up the wrists.  Wash for at least 15 to 20 seconds. Don t just wipe. Scrub  well.  Rinse, letting the water run down your fingers, not up your wrists.  Dry your hands well. Use a paper towel to turn off the faucet and open the door.  Time matters  The longer you wash your hands, the more germs you ll remove. Most people wash their hands for 6 to 7 seconds. But at least 15 seconds are needed to remove germs. Singing Happy Birthday or the Alphabet Song are examples of how long 15 seconds would be. To protect yourself and others from infection, washing for 30 seconds is best.  How to use an alcohol-based hand   Alcohol-based hand  may kill more germs than soap and water. Use them when your hands aren t visibly dirty. For best results, follow these steps:  Choose a gel or spray that contains at least 60 percent alcohol. Products with less alcohol may not kill germs.  Spread about a tablespoon of  in the palm of one hand.  Rub your hands together briskly, cleaning the backs of your hands, the palms, between your fingers, and up the wrists.  Rub until the  is gone, and your hands are completely dry.                Patient/Caregiver understanding: Patient verbalized understanding, engaged in AIDET communication behavior during encounter.

## 2021-05-31 VITALS — WEIGHT: 113 LBS | HEIGHT: 61 IN | BODY MASS INDEX: 21.34 KG/M2

## 2021-05-31 VITALS — WEIGHT: 116 LBS | BODY MASS INDEX: 21.9 KG/M2 | HEIGHT: 61 IN

## 2021-05-31 VITALS — BODY MASS INDEX: 21.92 KG/M2 | WEIGHT: 116 LBS

## 2021-05-31 VITALS — BODY MASS INDEX: 21.73 KG/M2 | WEIGHT: 115 LBS

## 2021-05-31 VITALS — BODY MASS INDEX: 20.97 KG/M2 | WEIGHT: 111 LBS

## 2021-05-31 VITALS — BODY MASS INDEX: 21.54 KG/M2 | WEIGHT: 114 LBS

## 2021-05-31 VITALS — WEIGHT: 118 LBS | BODY MASS INDEX: 22.3 KG/M2

## 2021-05-31 VITALS — WEIGHT: 115 LBS | BODY MASS INDEX: 21.73 KG/M2

## 2021-05-31 NOTE — PROGRESS NOTES
Outpatient Mental Health Treatment Plan     Name:  Silvino Caceres  :  1980  MRN:  [id number]     Treatment Plan:  Updated Treatment Plan  Intake/initial treatment plan date:  2017                   Benefit and risks and alternatives have been discussed: Yes  Is this treatment appropriate with minimal intrusion/restrictions: Yes  Estimated duration of treatment:  Approximately 20 sessions.  Anticipated frequency of services:  Every 1-2 weeks  Necessity for frequency: This frequency is needed to establish therapeutic goals and for continuity of care in order to monitor progress.  Necessity for treatment: To address cognitive, behavioral, and/or emotional barriers in order to work toward goals and to improve quality of life.     Plan:      ? Depression                                     Goal:              Decrease average depression level from 4 to 1.                        Strategies:                 ?[x] Decrease social isolation                                                                    [x] Increase involvement in meaningful activities                                                                    ?[x] Discuss sleep hygiene                                                                    ?[x] Explore thoughts and expectations about self and others                                                                    ?[x] Process grief (loss of significant person, independence, role, etc.)                                                                    ?[x] Assess for suicide risk                                                                    ?[x] Implement physical activity routine (with physician approval)                                                                    [x] Consider introduction of bibliotherapy and/or videos                                                                    [x] Continue compliance with medical treatment plan (or explore barriers)                                                                           - Referral to [facility] through [facility]                                                                     ?     ?Degree to which this is a problem: 4  Degree to which goal is met: 1  Date of Review: 08/29/2019-11/29/2019        ?Other:  PTSD  Goal: Decrease average impact of PTSD symptoms from 4 to 1                        Strategies:                             - Forge a therapeutic alliance built on trust in order to allow for further processing of trauma experience.                        - Assessment of client-acculturation process. Psycho-education about western-therapy.                        - Exposure therapy and psycho-education to develop a sense normalization, legitimization, and description of trauma reactions.                         - Development of trauma timeline or narrative.                        - Cognitive Restructuring to help make sense of the trauma and to put meaning to the trauma- develop new insights and thorough understanding of behaviors during the event- modify feelings of guilt and shame.                         - Learn and implement somatic/experiential techniques.                        - Learn and Implement Mindfulness/Grounding techniques to decrease hyperarousal.             ?      Degree to which this is a problem: 4  Degree to which goal is met: 1  Date of Review: 08/29/2019-11/29/2019          Patient may also benefit from the following services and referrals:   - Psychiatry services (if patient willing to re-establish care and take medications again)  - [facility]  - Clinic Care Coordination (Patient declined at this time)  - Determine eligibility for any additional financial supports/services: energy assistance, housing assistance, additional food support, etc.  - Employment support- [facility] [employer] [facility] [employer] services or Workforce Center.   -Increased social support/socialization (Patient declining [facility] at this time)  -Continue attending English classes         Functional Impairment:   Personal: 4  Family: 3  Work: 2  Social:4     Diagnosis   PTSD (F43.10)  Persistent Depressive Disorder (Dysthymia) (F34.1)     WHODAS 2.0 12-item version: 19 or 40%    Scores presented in qualifiers to represent level of disability.  MODERATE Problem (medium, fair, ...) 25-49%  H1= 25  H2= 5  H3= 12        Clinical assessments and measures completed:. SNEHAL-7, PHQ-9, PCL-C, CAGE-AID and C-SSRS  PHQ-9 Total Score: 19  SNEHAL 7 Total Score: 3  Score: __/4: 0  In the past Month, the patient has been bothered by:   Stressful event patient experienced : civil war in [place]- many near death experiences  How difficult have the probelms made it for pt to work, at home, getting along with others: Very difficult  Repeated disturbing memories, thoughts, or images of the stressful experience? : Not at all  Repeated, disturbing dreams of the stressful experience: Moderately  Suddenly acting or feeling as if the stressful experience was happening again as if reliving it: Moderately  Feeling very upset when something remindes patient of the stressful experience: Moderately  Having physical reactions when something reminded the patient of the stressful experience: A little bit  Avoiding thinking about or talking about the stressful experience or avoiding having feelings about it: Quite a bit  Avoiding activitiesor situations because they remind patient of the stressful experience : Moderately  Trouble remembering important parts of the stressful experience: Not at all  Loss of interest in activities that you used to enjoy: Quite a bit  Feeling distant or cut off from other people: Quite a bit  Feeling emotionally numb or being able to have loving feelings for those close to you: A little bit  Feeling as if future will be cut short: Not at all  Trouble falling or staying asleep: Extremely  Feeling irritable or having angry outbursts:  "Quite a bit  Having difficulty concentrating: Extremely  Being \"Super alert\" or watchful or on guard: A little bit  Feeling jumpy or easily startled: Not at all  C-SSRS= Low to moderate risk. Passive Wish to be dead. \"If I die, it would be better\"  CGI: Most recent:  Considering your total clinical experience with this particular patient population, how severe are the patient's symptoms at this time?: 5 - Markedly ill  Compared to the patient's condition at the START of treatment, this patient's condition is:: 3 - Minimally improved    Strengths: Patient is resourceful and resilient. She maintains regularly appointments and always attends.       Limitations: Tendency to isolate. Lacks social support. Patient appeared to be a poor historian, lacking insight regarding the impact of underlying emotional issues. Despite extensive prompting, the patient has difficulty engaging in the therapeutic process but displays some willingness to gain trust in the therapist. She is often guarded in session. Stopped taking medications without oversight of physician.     Cultural Considerations: The patient is a [place] female who was born in [place]. She grew up in [place] and eventually lived in a refugee [place] which she left at age 25. She moved to [place] in 2011 but first lived in [place] and [place] before finally relocating to [place] in 2016. She experiences acculturation difficulties and barriers. Patient relies on the assistance of a professional, Yareli  to communicate with providers.      Persons responsible for this plan: Patient, Provider and Other: Coordinate with PCP and psychiatry as appropriate.         Rae WALLIS 08/29/2019  Psychotherapist Signature           Silvino Caceres 08/29/2019  Patient Signature:              Guardian Signature             Provider: Performed and documented by BIMAL Arevalo   Date:  8/29/2019      "

## 2021-05-31 NOTE — PROGRESS NOTES
"Mental Health Visit Note    8/14/2019    Start time: 10:08am    Stop Time: 10:54am   Session # 6    Session Type: Patient is presenting for an Individual session.    Silvino Caceres is a 39 y.o. female is being seen today for    Chief Complaint   Patient presents with      Follow Up     session to address coping with body aches and depressed mood.        New symptoms or complaints: None    Functional Impairment:   Personal: 4  Family: 3  Work: 4  Social: 4    Clinical assessment of mental status: Reviewed. MSE is current effective 8/14/19  Grooming: Well groomed  Attire: Appropriate  Age: Appears Stated  Behavior Towards Examiner: Cooperative  Motor Activity: Within normal  Eye Contact: Appropriate  Mood: Euthymic  Affect: Congruent w/content of speech.  Speech/Language: Within normal  Attention: Within normal  Concentration: Brief  Thought Process: Within normal  Thought Content: Hallucinations: Within noraml  Delusions: Within normal  Orientation: X 3  Memory: Impairment  Judgement: No Evidence of Impairment  Estimated Intelligence: Below Average  Demonstrated Insight: Diminished  Fund of Knowledge: adequate        Suicidal/Homicidal Ideation present: None Reported This Session    Patient's impression of their current status:   Denies thoughts keeping her awake. Feels medication doesn't help. \"If I keep taking medication for sleep, it will destroy my brain or hurt my memory.\" Admits to some clock watching, but then minimizes and says only looks when she has an appointment. Not able to identify what keeps her awake besides some \"aches\" in body. Only getting 2-3 hours of sleep per night.   Feeling nausea from increased Prazosin dosage to 2mg. \"Felt like I wanted to throw up at PT yesterday.\" Believes it is from the increased medication dosage. \"Even with 1 pill I still felt like that.\"  \"Before I came to [place], I didn't have to take medications. Now I have to take medications\"  I've never slept 8 hours. Denies fear " "to fall asleep. Denies napping in the day. Denies caffeine intake. Denies hearing voices or seeing things that scare her or keep her away.     Therapist impression of patients current state: Patient presented for follow up individual psychotherapy visit. Session started late due to trying to find an available . A professional female, Yareli , Benji, from [place], was present for the visit due to the language complexity. Recommend follow up with prescriber of sleep medication that she does not feel effective or consider sleep study. She reports no changes in sleep amount with recent change from 1mg to 2mg Prazosin. She lacks insight into what is impacting her ability to sleep. Only getting 2-3 hours of sleep per night as she reports will have significant physical and mental affects. We have reviewed sleep hygiene repeatedly, she states she follows the steps, but does not see improvement. Unsure about accuracy of her reporting re: sleep or implementation of medication and sleep hygiene skills because she is not getting improved results from any of the interventions per her report.  Therapist will consult with Johnna Morales PA-C regarding continued sleep concerns and side effects of medication. Patient didn't disclose this to provider at recent appointment as she \"wanted to try first.\"   She has a history of not wanting to take medications and stopping medications without provider oversight. She also has a history of sensitivity to some medications. Her view is to try alternative forms of treatment besides medications; however, sleep hygiene skills haven't made improvement either.  Therapist believes it is likely mental health related, but will still consult with PCP and consider sleep specialist referral.   Encouraged patient to continue to implement sleep hygiene skills as behavioral/habit changes take time to change and see improvements.   Patient has transportation barrier- typically walks " to clinic. Challenge getting to appointments further away- learn to use medical rides. She will need them to go to PT. She has never used them and doesn't know how she said.     Type of psychotherapeutic technique provided: Insight oriented and Client centered, sleep hygiene, motivational interviewing    Progress toward short term goals: Good Progress, followed up on pain concerns with PCP and has started PT.  Taking medications. Poor progress on seeing any improvements in sleep.     Review of long term goals: Not done at today's visit. Effective 06/03/2019-09/03/2019.    Diagnosis:   1. Persistent depressive disorder with anxious distress, currently moderate    2. PTSD (post-traumatic stress disorder)    3. Insomnia, unspecified type        Plan and Follow up: Patient is scheduled for follow up psychotherapy on 8/29/19.      Discharge Criteria/Planning: Patient will continue with follow-up until therapy can be discontinued without return of signs and symptoms.    Rae Lentz [facility] 8/14/2019

## 2021-05-31 NOTE — PROGRESS NOTES
"Mental Health Visit Note    7/31/2019    Start time: 10:08am    Stop Time: 10:40am   Session # 5    Session Type: Patient is presenting for an Individual session.    Silvino Caceres is a 39 y.o. female is being seen today for    Chief Complaint   Patient presents with      Follow Up     session to address inability to sleep and persistent depressed mood       New symptoms or complaints: None    Functional Impairment:   Personal: 4  Family: 3  Work: 4  Social: 4    Clinical assessment of mental status: Reviewed. MSE is current effective 7/31/19  Grooming: Well groomed  Attire: Appropriate  Age: Appears Stated  Behavior Towards Examiner: Cooperative  Motor Activity: Within normal, calm/relaxed  Eye Contact: Appropriate  Mood: Depressed  Affect: Congruent w/content of speech.  Speech/Language: Delayed/Hesitant and Soft  Attention: Within normal  Concentration: Brief  Thought Process: Within normal  Thought Content: Hallucinations: Within noraml  Delusions: Within normal  Orientation: X 3  Memory: Impairment  Judgement: No Evidence of Impairment  Estimated Intelligence: Below Average  Demonstrated Insight: Diminished  Fund of Knowledge: adequate        Suicidal/Homicidal Ideation present: None Reported This Session, which is improvement.     Patient's impression of their current status:   Patient reports \"she is not feeling good today.\"  Patient endorses the following symptoms: Body chills, potential fever feeling, nausea lasting 1 week, fatigue, shoulder pain  Sleep: Continues to be poor for example last night slept from 3 AM to 6 AM.  She spent the whole evening laying in bed without being able to fall asleep until 3 AM.  She shares a room with a 7 or 8-year-old child and reports the child does not keep her up as it sleeps well through the night.  She denies any sounds or lights in the room that are bothersome to her.  She continues to state that she is easily awoken if she hears vehicles outside.  She has been trying " "to go to bed at 8:00 because she \"wants to sleep early.\"  At this time her body feels tired, but she says her eyes do not feel tired.  Therefore her sleep has been \"no different\" since our last session.  Depressed mood: Continues to have depressed mood.  \"It is hard, difficult-I feel down because I cannot work.\"  Anxiety/Worries: No reported worries at this time  Pain: Shoulder pain that she states started when she moved to the [place].  Denies any injury that occurred and is not sure why she has the shoulder pain.  Prescribed medication to help with pain.  No reported history or orders for anything such as physical therapy.  Appetite: No changes or concerns noted  Engagement in activities: Minimal activity, reports spending some time in her bed during the day.  Working 1 hour as a PCA.  No other work, no longer attending classes.  She did follow up and talk to peers and people in her community about jobs that are available and stated \"there are a lot of jobs, but I cannot work because of my shoulder and body pain.\"  Medication Adherence: Reports she is taking medications but does not feel they are effective.      Therapist impression of patients current state: Patient presented for follow up individual psychotherapy visit. Session started late due to trying to find an available . A professional female, Yareli , Ravi Alvares, from [place], was present for the visit due to the language complexity. Patient continues to have chronic depressed mood. She was more open in session today than our last visit.  Her body language was calm today and relaxed.  She has not followed up with a doctor about not feeling well.  Significant amount of time was spent reviewing her sleep routine and continued education on sleep hygiene.  It is likely that she has been going to bed too early in her eyes are not ready for sleep.  She was encouraged to try a different activity that allows her body to relax but is not in " "her bed.  We decided that something she could do was spent time in the living room when her body feels tired but her eyes do not.  She lacks activities that she does other than watching some TV.  We adjusted and created sleep schedule for her.  This was listed in patient instructions and reviewed multiple times with patient.  She states she \"will try, but not sure if it will work.\"  However her current sleep routine has not been working well for her either.  We started talking about the mind and body connection as it relates to her physical pain and emotional pain that are holding her back from pursuing more work.  She was encouraged to follow-up with her PCP about her reported shoulder pain today to see if there are alternative things that could help decrease pain.  Therapist would like to work further on mind and body connection because she currently is stuck in mental and physical pain.  It is likely that working would help improve her mood.  Based on my knowledge of her physical and mental health history, I do believe she is capable of working.  Therapist would like to first make some changes in the sleep routine and see improvements with sleep before further working on the mind body connection, because lack of sleep is also a significant factor in her functioning at this time.    Type of psychotherapeutic technique provided: Insight oriented and Client centered, sleep hygiene, education on mind-body connection.    Progress toward short term goals:Progress as expected, met with [facility].  View encounter details below:  \"Patient reported they do not need waiver services or PCA services. SW educated patient on [facility] services and they reported they do not want this at this time.\"  Goal Statement- I want to know if I'm eligible for food support within one month  Action steps to achieve this goal  1.  [facility] SW will place referral to [facility] to review my information    Review of long term goals: Not done at today's visit. " Effective 06/03/2019-09/03/2019.    Diagnosis:   1. Persistent depressive disorder with anxious distress, currently moderate    2. PTSD (post-traumatic stress disorder)        Plan and Follow up: Patient is scheduled for follow up psychotherapy on 8/14/19, 8/29/19.      Discharge Criteria/Planning: Patient will continue with follow-up until therapy can be discontinued without return of signs and symptoms.    Rae Lentz [facility] 7/31/2019

## 2021-05-31 NOTE — PROGRESS NOTES
Subjective:    Silvino Caceres is a 39 y.o. female who presents for evaluation of shoulder pain.  She has had bilateral shoulder pain for years.  She cannot think of any specific injuries that have caused the pain.  She is never really had any treatment for this before.  She did mention this to her therapist at 1 of their previous visits.  Patient carries heavy things at work.  She says primarily the only time she gets pain is when she has to lift heavy things at work.  No significant pains with just carrying things or other movements.  She says sometimes she feels like the pain in her shoulders goes all the way down her body into her legs.  Occasionally her  gets weak, but overall  is normal.      Additionally, she is continuing to have some difficulty sleeping.  This is being addressed with her therapist.  I reviewed last note with the therapist today regarding working on sleep and improving sleep hygiene/routine.  She is taking prazosin 1 mg daily.  She feels like her sleep is up and down.  Some days she has better sleep, other days it is worse.  It is hard for her to say if 1 mg prazosin helps her sleep or not.  She does take it most nights.    Patient Active Problem List   Diagnosis     Trouble in sleeping     Anemia     Hepatitis B     Hyperlipidemia     Vitamin D deficiency     Severe episode of recurrent major depressive disorder, with psychotic features (H)     PTSD (post-traumatic stress disorder)     Persistent depressive disorder with anxious distress, currently moderate       Current Outpatient Medications:      azelastine (OPTIVAR) 0.05 % ophthalmic solution, One drop in each eye 1-2 times a day, as needed., Disp: 6 mL, Rfl: 12     cholecalciferol, vitamin D3, (VITAMIN D3) 2,000 unit Tab, Take 1 tablet (2,000 Units total) by mouth daily., Disp: 100 tablet, Rfl: 1     hydrocortisone (ANUSOL-HC) 2.5 % rectal cream, Apply topically to affected area BID - no longer than 14 days, Disp: 30 g, Rfl: 1     " miconazole (MICATIN) 2 % cream, Apply to affected area 2 times daily, Disp: 15 g, Rfl: 1     omeprazole (PRILOSEC) 20 MG capsule, Take 1 capsule (20 mg total) by mouth daily., Disp: 30 capsule, Rfl: 11     prazosin (MINIPRESS) 1 MG capsule, Take 1 capsule (1 mg total) by mouth at bedtime., Disp: 30 capsule, Rfl: 3     acetaminophen (TYLENOL EXTRA STRENGTH) 500 MG tablet, Take 1 tablet (500 mg total) by mouth every 6 (six) hours as needed for pain., Disp: 100 tablet, Rfl: 0     Objective:   Allergies:  Patient has no known allergies.    Vitals:  Vitals:    08/09/19 0915   BP: 98/58   Patient Site: Right Arm   Patient Position: Sitting   Cuff Size: Adult Regular   Pulse: 66   Resp: 16   Temp: 98  F (36.7  C)   TempSrc: Oral   Weight: 122 lb (55.3 kg)   Height: 5' 0.83\" (1.545 m)     Body mass index is 23.18 kg/m .    Vital signs reviewed.  General: Patient is alert and oriented x 3, in no apparent distress, appropriately groomed with normal affect  Cardiac: regular rate and rhythm, no murmurs  Pulmonary: lungs clear to auscultation bilaterally, no crackles, rales, rhonchi, or wheezing noted  Muscular skeletal: Pain is primarily in the bilateral posterior shoulder blades, no pain with direct palpation there, full active range of motion of both arms at the shoulders without pain, normal  strength, normal 4/5 strength in major muscle groups in upper extremities bilaterally, full active range of motion of neck and normal strength in neck muscles      Assessment and Plan:   1.  Bilateral shoulder pain, likely musculoskeletal in origin.  She has not taken any medication for pain.  Prescription sent for Tylenol today and I reviewed appropriate use.  Referral placed for physical therapy.  It is possible that there may be some element of somatization with her pain.  Follow-up with PCP as needed.    2.  Trouble sleeping.  We discussed treatment options.  She will continue to work with her therapist regarding overall mental " health and improved sleep.  For now, increase prazosin to 2 mg at night.  She has almost a full bottle of medicine.  For now she will take 2 pills (two 1 mg pills) for sleep.  If this is helpful, will refill at a 2 mg dose, so she will only need to take one 1 pill of the new prescription.    This dictation uses voice recognition software, which may contain typographical errors.

## 2021-05-31 NOTE — PATIENT INSTRUCTIONS - HE
Sleep Hygiene Skills  *Use bed only for sleeping at night    Routine   6pm- Dinner  7pm- relax in living room  8:30pm- Medication  9pm- go to bed  *If unable to fall asleep, leave bed by 9:30ish and go back to living room for quiet calm activity. Go back to bed once eyes start to feel tired again.
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
## 2021-05-31 NOTE — TELEPHONE ENCOUNTER
I spoke with HENRY from [employer].    Patient has been scheduled with Krishna ResendizGyfmixnhx-[phone] for a  time of 8 am round trip.   TRIP ID#:  [id number]

## 2021-05-31 NOTE — PROGRESS NOTES
: Keven ID #: [id number] Agency: [employer]    The CHW called the patient and went through the goals. The patient is now receiving the $70 in SNAP benefits. The CHW completed this goal and asked about any other concerns. At this time there are no other concerns.    Encounter routed to the RN to complete a Chart Review for Maintenance.    Upcoming Appointments:  8/29/19 at 10:00 with Rae Lentz    Next Outreach: 9/11/19

## 2021-05-31 NOTE — TELEPHONE ENCOUNTER
Use  line to contact : ElaynewID:[id number]  Called and spoke with pt , Message was given, pt understood, no further questions.  Appointment made to come in.

## 2021-05-31 NOTE — PROGRESS NOTES
[facility] Daily Progress     Patient Name: Silvino Caceres  Date: 8/20/2019  Visit #: 2/10 (10/22/19)  PTA visit #:    Referral Diagnosis: Chronic pain of both shoulders  Referring provider: Johnna Morales, * **  Visit Diagnosis:     ICD-10-CM    1. Chronic upper back pain M54.9     G89.29    2. Arthralgia of upper arm, unspecified laterality M25.529    3. Poor posture R29.3    4. Generalized muscle weakness M62.81      Assessment:   Pt needs moderate cueing for all HEP. Unable to perform rows with correct form with cues and demonstration.      From Evaluation :Pt is a 39 y.o. year old female with bilateral shoulder, upper back and arm pain since 2012.  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 Findings are consistent with myofascial pain, poor posture and neural tension.  Patient does not appear motivated to participate in Physical Therapy. Pt would benefit from PT but is unsure if she will be about to come to therapy due to transportation issues.      Patient demonstrates understanding/independence with home program.  Patient is benefitting from skilled physical therapy and is making steady progress toward functional goals.  Patient is appropriate to continue with skilled physical therapy intervention, as indicated by initial plan of care.    Goal Status:  Pt. will demonstrate/verbalize independence in self-management of condition in : in other weeks  [place] Self Management Progress Towards Goal Other:  10 weeks   Pt. will be independent with home exercise program in : 6 weeks  Pt. will report decreased intensity, frequency of : Pain;in 6 weeks  Pt. 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       Plan / Patient Education:     Continue with initial plan of care.  Progress with home  program as tolerated.   Plan for next session: review HEP   STM to upper back and arms     Subjective:     Pain Rating: no change   Pt reports compliance with HEP    Objective:     Exercises:  Exercise #1: pec stretch at doorway or corner up to 30 seconds   Comment #1: rows x10-15 with OTB - added lat pull down   Exercise #2: scapular retraction hold 5 secs x10   Comment #2: countertop push-ups x10     Cueing needed for review of HEP.  Pt unable to perform rows with correct form.     Treatment Today     TREATMENT MINUTES COMMENTS   Evaluation     Self-care/ Home management     Manual therapy 8 Prone: upper back STM - UT and rhomboid, rib mobs   Neuromuscular Re-education     Therapeutic Activity     Therapeutic Exercises 18 See ex's flow sheet    Gait training     Modality__________________                Total 26    Blank areas are intentional and mean the treatment did not include these items.       Erin Mason, PT, DPT   8/20/2019  [facility] Discharge Summary  Patient Name: Silvino Caceres  Date: 9/24/2019  Referral Diagnosis:Chronic pain of both shoulders  Referring provider: Johnna Morales, * **  Visit Diagnosis:   1. Chronic upper back pain     2. Arthralgia of upper arm, unspecified laterality     3. Poor posture     4. Generalized muscle weakness         Goals: NOT MET   Pt. will demonstrate/verbalize independence in self-management of condition in : in other weeks  [place] Self Management Progress Towards Goal Other:  10 weeks   Pt. will be independent with home exercise program in : 6 weeks  Pt. will report decreased intensity, frequency of : Pain;in 6 weeks  Pt. 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       Patient was seen for 2 visits from 8/20/19 to 8/13/19 with 0 missed appointments.  The patient discontinued therapy, did not  return.    Therapy will be discontinued at this time.  The patient will need a new referral to resume.    Thank you for your referral.  Erin Mason, PT, DPT   9/24/2019  2:06 PM

## 2021-05-31 NOTE — TELEPHONE ENCOUNTER
Does she want to come see me to discuss her shoulder pain, and review her medications?  Bring all of her pills with to appointment.

## 2021-05-31 NOTE — TELEPHONE ENCOUNTER
Can you see if patient is eligible for medical transportation?   If so, can you please schedule transportation for the upcoming appointment listed below?    Insurance Info  [employer] MA   [place],KIRSTY COLLADO [id number]      address/residence:   Kirsty Collado [place]  [address] [place] [address]  Phone: ([phone])      Patient is needing a ride for their appointment on:      Date: 08/20/2019  Time: 9:00am    Name of Location/Address/Phone #:   [facility]   [address] [place] [address]    Name of DrJosey patient is seeing & 's specialty:   Erin Mason, PT    Passenger (s):   1    Special considerations: None    Is the patient able to walk to the transportation vehicle?    Yes     Do they need DOOR to DOOR service? (company person comes knock on the door and walks them to car)     No    Please call patient back to confirm the ride details. Thanks!

## 2021-05-31 NOTE — PROGRESS NOTES
[facility]   Shoulder Initial Evaluation    Patient Name: Silvino Caceres  Date of evaluation: 8/13/2019  Referral Diagnosis: Chronic pain of both shoulders  Referring provider: Johnna Morales, * **  Visit Diagnosis:     ICD-10-CM    1. Chronic upper back pain M54.9     G89.29    2. Arthralgia of upper arm, unspecified laterality M25.529    3. Poor posture R29.3    4. Generalized muscle weakness M62.81      Precautions:  Trouble in sleeping      Anemia     Hepatitis B     Hyperlipidemia     Vitamin D deficiency     Severe episode of recurrent major depressive disorder, with psychotic features (H)     PTSD (post-traumatic stress disorder)     Persistent depressive disorder with anxious distress, currently moderate     Assessment:   Pt is a 39 y.o. year old female with bilateral shoulder, upper back and arm pain since 2012.  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 Findings are consistent with myofascial pain, poor posture and neural tension.  Patient does not appear motivated to participate in Physical Therapy. Pt would benefit from PT but is unsure if she will be about to come to therapy due to transportation issues.        Pt. is appropriate for skilled PT intervention as outlined in the Plan of Care (POC).  Pt. is a good candidate for skilled PT services to improve pain levels and function.    Goals:  Pt. will demonstrate/verbalize independence in self-management of condition in : in other weeks  [place] Self Management Progress Towards Goal Other:  10 weeks   Pt. will be independent with home exercise program in : 6 weeks  Pt. will report decreased intensity, frequency of : Pain;in 6 weeks  Pt. 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       Patient's goals: not sure     Patient's  expectations/goals are realistic. if pt continues to come to therapy .    Barriers to Learning or Achieving Goals:  Co-morbidities or other medical factors.  see above  Language barriers.   Chronicity of problem     8/9/19: MD: Patient carries heavy things at work.  She says primarily the only time she gets pain is when she has to lift heavy things at work.  No significant pains with just carrying things or other movements.  She says sometimes she feels like the pain in her shoulders goes all the way down her body into her legs.  Occasionally her  gets weak, but overall  is normal.    Plan / Patient Instructions:        Plan of Care:   Authorization / Certification Start Date: 08/13/19  Authorization / Certification End Date: 10/22/19  Authorization / Certification Number of Visits: up to 10   Communication with: Referral Source  Patient Related Instruction: Nature of Condition;Treatment plan and rationale;Self Care instruction;Basis of treatment;Posture;Body mechanics;Next steps;Expected outcome  Times per Week: 1  Number of Weeks: 10   Number of Visits: 10   Discharge Planning: self-management of symptoms   Therapeutic Exercise: ROM;Stretching;Strengthening  Neuromuscular Reeducation: kinesio tape;posture;core  Manual Therapy: soft tissue mobilization;myofascial release;joint mobilization  Modalities: traction;hot pack;cold pack;ultrasound  Equipment: theraband      POC and pathology of condition were reviewed with patient.  Pt. is in agreement with the Plan of Care  A Home Exercise Program (HEP) was initiated today.  Pt. was instructed in exercises by PT and patient was given a handout with detailed instructions.  Plan for next visit: follow-up with Erin   Next appointments with Inge   Posture: review HEP (pec stretch and rows), add lat pull down, counter push-ups, scapular retraction, nerve glides   STM to upper back  Ask about transportation   .   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 (walking 20-30 min a day) Pt reports swelling and burning in feet     History of Present Illness:    Silvino is a 39 y.o. female who presents to therapy today with complaints of bilateral shoulder pain. Date of onset/duration of symptoms is . Onset was gradual. Symptoms are intermittent and is not getting better.  She reports  an episodic  history of similar symptoms. She describes their previous level of function as limited with working.     Pain Ratin  Pain rating at best: 0  Pain rating at worst: 9  Pain description: burning   Pain is in upper back and shoulders.   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   No pain when she is sitting or still   Sleeping: wakes up 1-2x a night when changing positions    Patient reports benefit from:  anti-inflammatory, Tylenol helps a little bit        Objective:      Note: Items left blank indicates the item was not performed or not indicated at the time of the evaluation.    Patient Outcome Measures :    Shoulder Pain and Disability Index (SPADI) in %: 42     Scores range from 0-100%, where a score of 0% represents minimal pain and maximal function. The minimal clincically important difference is a score reduction of 10%.    Shoulder Examination  1. Chronic upper back pain     2. Arthralgia of upper arm, unspecified laterality     3. Poor posture     4. Generalized muscle weakness       Involved side: Bilateral  Posture Observation:      General sitting posture is  poor. Forward shoulder, cervical protraction     Shoulder/Elbow ROM    Date: 2019     Shoulder and Elbow ROM ( )   AROM in degrees AROM in degrees AROM in " degrees    Right Left Right Left Right Left   Shoulder Flexion (0-180 ) 180 180       Shoulder Abduction (0-180 ) 180 180       Shoulder Extension (0-60 )         Shoulder ER (0-90 )         Shoulder IR (0-70 )         Shoulder IR/Ext WFL WFL       Elbow Flexion (150 ) WFL WFL       Elbow Extension (0 ) WFL WFL        PROM in degrees PROM in degrees PROM in degrees    Right Left Right Left Right Left   Shoulder Flexion (0-180 )         Shoulder Abduction (0-180 )         Shoulder Extension (0-60 )         Shoulder ER (0-90 )         Shoulder IR (0-70 )         Elbow Flexion (150 )         Elbow Extension (0 )           Shoulder/Elbow Strength  Date: 8/13/2019     Shoulder/Elbow Strength (/5)  Manual Muscle Test (MMT) MMT MMT MMT    Right Left Right Left Right Left   Shoulder Flexion 4 4       Supraspinatus         Shoulder Abduction 4 4       Shoulder Extension         Shoulder External Rotation 4 3+       Shoulder Internal Rotation 4 3+       Elbow Flexion 4- 4-       Elbow Extension 4- 4-       Other:         Other:             Palpation: pain with all palpation: upper back/traps, L paraspinals, hypomobile C4-5 on L     Joint Assessment:  Sternoclavicular Joint Assessment: Not Indicated  Acromioclavicular Joint Assessment: Not Indicated  Scapulothoracic Joint Assessment: not tested  Glenohumeral Joint Assessment:WNL.    Shoulder Special Tests     Impingement Cluster Right (+/-) Left (+/-) Rotator Cuff Tests Right (+/-) Left (+/-)   Neer    Lift off      Sumner-Lam   Drop Arm Sign     Painful Arc                  -180/GH  - - Hornblowers (teres minor) 90/90 scaption with ER resistance     Infraspinatus Test   ERLS (lag sign)      AC Tests Right (+/-) Left (+/-) IRLS (lag sign)      Kearney's Active Compression (or labral) 10 adduction pain with thumb down    Labral Tests Right (+/-) Left (+/-)   AC Resisted Extension   (90 flexion and IR)    Biceps Load Test II - resist elbow flex in apprehension  position     Crossbody Adduction   Jerk Test     AC step down deformity    Anna Test     GH Instability Tests Right (+/-) Left (+/-)      Sulcus Sign   Biceps Tests Right (+/-) Left (+/-)   Apprehension   Speed - palm up      Relocation   Dat s - resist supination/ER     Other:   Nerve testing :  Median   Ulnar Tingling     stretch Tingling     Stretch    Other:   Other:       Pt reports nausea while laying prone.     Treatment Today    TREATMENT MINUTES COMMENTS   Evaluation 25 -shoulder pain   Self-care/ Home management     Manual therapy 8 Upper trap stretch, grade III lateral joint mobs L C3-4-5   Upper traps STM      Neuromuscular Re-education     Therapeutic Activity     Therapeutic Exercises 20 -see exercise flow sheet  -educated on POC, diagnosis, relevant anatomy and basis for treatment. Performed HEP with handouts provided.    Gait training     Modality__________________                Total 53    Blank areas are intentional and mean the treatment did not include these items.     PT Evaluation Code: (Please list factors)  Patient History/Comorbidities: see above  Examination: UE  Clinical Presentation: stable   Clinical Decision Making: low    Patient History/  Comorbidities Examination  (body structures and functions, activity limitations, and/or participation restrictions) Clinical Presentation Clinical Decision Making (Complexity)   No documented Comorbidities or personal factors 1-2 Elements Stable and/or uncomplicated Low   1-2 documented comorbidities or personal factor 3 Elements Evolving clinical presentation with changing characteristics Moderate   3-4 documented comorbidities or personal factors 4 or more Unstable and unpredictable High                Erin Mason, PT, DPT  8/13/2019  8:27 AM

## 2021-06-01 VITALS — BODY MASS INDEX: 22.28 KG/M2 | WEIGHT: 118 LBS | HEIGHT: 61 IN

## 2021-06-01 VITALS — WEIGHT: 113.02 LBS | BODY MASS INDEX: 21.35 KG/M2

## 2021-06-01 VITALS — BODY MASS INDEX: 21.35 KG/M2 | WEIGHT: 113 LBS

## 2021-06-01 VITALS — WEIGHT: 113 LBS | BODY MASS INDEX: 21.35 KG/M2

## 2021-06-01 NOTE — PROGRESS NOTES
My Clinic Care Coordination Wellness Plan  This Maintenance Wellness Plan provides private information in regard to the work I have done with my Care Team at my Primary Care Clinic.  This document provides insight on the goals I have worked hard to achieve.  My Care Team congratulates me on my journey to become well.  With the assistance of the Clinic Care Coordination Team and my Primary Care Provider, I have succeeded in improving areas of my health that I identified as barriers to becoming well.  I will continue to seek wellness and use the skills I have obtained to further my journey.  My Community Health Worker will follow up with me in two months.  In the meantime, if I should have any questions or concerns I will contact my Community Health Worker.    [address]  [address] [phone]    My Preferred Method of Contact:  Phone: [phone]    My Primary/Preferred Language:  Yareli    Preferred Learning Style:  Face to face discussion, Pictures/Diagrams and Hands on teaching    Emergency Contact: Extended Emergency Contact Information  Primary Emergency Contact: [facility]  Address: [address] [place] [address] [facility]  Home Phone: [phone]  Relation: Cousin  Secondary Emergency Contact: Keila White   [place]  Mobile Phone: [phone]  Relation: Friend     PCP:  Maggie Rowe MD  Specialists:    Care Team            Maggie Rowe MD   [phone] PCP - General, Family Medicine    Rae Lentz, [facility]   [phone] Licensed Clinical     psychotherapist    Lilia Brock W   [phone] Community Health Worker, Primary Care - CC    [place] [employer]     Faxed to the [facility] on 7/19/19    Johnna Morales PA-C   [phone] Assigned PCP    Mena Gayle RN Lead Care Coordinator, Primary Care - [facility]          Accomplishments:  Goals        Patient Stated      COMPLETED:  Financial Wellbeing (pt-stated)      Goal Statement- I know that I'm eligible for food support.    Personal Plan  I will make sure that I talk to my Financial Worker at the [employer] if there are any concerns. I will make sure that I fill out all of my renewal paperwork and send it back on time.        COMPLETED: I don't want waivered services or PCA services (pt-stated)      I discussed [facility] benefits with [facility] on 7/17/19 and clarified I would like food support, not waiver supports       Date goal set: 07/03/19   Updated and completed: 7/17/19 BC              Advanced Directive/Living Will: The patient was given information regarding Adanced Directives/Living Will    Emergency Plan:    When a Loved One Has a Mental Illness   It s hard to watch a loved one deal with mental illness. You want to help. Yet you may not know what to do. Your loved one may even push you away. But don t give up. Your support is needed now more than ever. Talk to your loved one s health care provider. Or contact a group for families of people with mental illness. They can help give you the guidance you need.  What you can do  Living with mental illness can be overwhelming. Your loved one may say or do things that shock or frighten you. Sometimes your loved one may resist treatment. Knowing what to do can help you cope:    Help your loved one get proper care. Often people with a mental illness deny there s a problem. Or they may not be able to seek help on their own.    Encourage your loved one to stick with treatment. This may be your most crucial job. Medicines that treat mental illness can have side effects. As a result, your loved one may stop taking them. But this will likely cause symptoms to come back. You might also want to go to healthcare provider visits with your loved one to discuss medicine and other issues.    Provide emotional support. Encourage your loved one to share his or her feelings. Listen and don t . Let your  loved one know he or she can count on you.    Be patient. The healing process takes time. In some cases, your loved one may never fully recover. But his or her symptoms will likely improve.    Ask them to join in. Invite your loved one to take part in activities. But don t push.    Take care of yourself. Helping your loved one can be very stressful. Take time to care for yourself. You ll have more patience and will be better able to cope.       Seeking help    If you are worried your loved one may harm themselves or attempt suicide, ask him or her. Asking doesn t cause suicide.    If the suicide risk is not immediate, call the person s healthcare provider, go to a local mental health clinic, or call the National Suicide Prevention Lifeline at 329-076-TKLV (9811). It is open 24 hours a day, every day. They speak English and Lithuanian. This resource provides immediate crisis intervention and information on local resources. It is free and confidential.     Don t ignore a person's talk of suicide or think it s just an attention-seeking behavior. As hard as it is, talking can save lives.  Call 911    Call 911 if the person is at immediate risk of suicide (he or she has a suicide plan and access to a method such as guns or drugs). Or take the person to the nearest emergency room. Don t leave the person alone. Remove any guns and medicines.   Resources    National [place] on Mental Illness 294-177-3096 www.ingrid.org    National [place] of Mental Health 775-678-9932 www.nimh.nih.gov    Mental Health [place] 084-328-2220 www.[facility].org    National Suicide Prevention Lifeline 873-932-NNJG (4587) www.suicidepreventionlifeline.org           [facility] Mental Health Crisis Lines:  [facility] [phone]  [facility] [phone]  [facility] [phone]  [facility] [phone]  [facility], Adults [phone]  [facility], Children [phone]  [facility], Adults [phone]  [facility]  [facility] [phone]     Emergency Plan Recommendation:     When to Use the Emergency Department (ED)  An emergency means you could die if you don t get care quickly. Or you could be hurt permanently (disabled). Read below to know when to use -- and when not to use -- an emergency department (also called ED).     Dangers to your life  Here are examples of emergencies. These need immediate care:  A hard time breathing  Severe chest pain  Choking  Severe bleeding  Suddenly not able to move or speak  Blacking out (fainting)`  Poisoning     Dangers of permanent injuries  Here are other emergencies. These also need immediate care:  Deep cuts or severe burns  Broken bones, or sudden severe pain and swelling in a joint     When it s an emergency  If you have an emergency, follow these steps:     1. Go to the nearest emergency department  If you can, go to the hospital ED closest to you right away.  If you cannot get there right away, or if it is not safe to take yourself, call 911 or your police emergency number.  2. Call your primary care doctor  Tell your doctor about the emergency. Call within 24 hours of going to the ED.  If you cannot call, have someone call for you.  Go to your doctor (not the ED) for any follow-up care.     When it s not an emergency  If a problem is not an emergency, follow these steps:     1. Call your primary care doctor  If you don t know the name of your doctor, call your health plan.  If you cannot call, have someone call for you.  2. Follow instructions  Your doctor will tell you what you should do.  You may be told to see your doctor right away. You may be told to go to the ED. Or you may be told to go to an urgent care center.  Follow your doctor s advice.      Clinical Emergency Plan    All [facility] clinic patients have access to a Nurse 24 hours a day, 7 days a week.  If you have questions or want advice from a Nurse, please know [facility] is here for you.  You can call your  clinic and they will connect you or you can call Care Connection at [phone].  [facility] also has Walk In Care clinics in multiple locations.  Call the number listed above for more information about our Walk In Care clinics or visit the [facility] website at [email or web address].

## 2021-06-01 NOTE — PROGRESS NOTES
"  Mental Health Visit Note    9/27/2019    Start time: 2:15pm    Stop Time: 2:55pm   Session # 9    Session Type: Patient is presenting for an Individual session.    Silvino Caceres is a 39 y.o. female is being seen today for    Chief Complaint   Patient presents with      Follow Up     session to address coping with sadness and poor sleep         New symptoms or complaints: None    Functional Impairment:   Personal: 4  Family: 3  Work: 4  Social: 4    Clinical assessment of mental status: Reviewed. MSE is current effective 9/27/19  Grooming: Well groomed  Attire: Appropriate  Age: Appears Stated  Behavior Towards Examiner: Cooperative  Motor Activity: Within normal  Eye Contact: Appropriate  Mood: Depressed  Affect: Congruent w/content of speech and Flat.  Speech/Language: Within normal  Attention: Within normal  Concentration: Brief  Thought Process: Within normal  Thought Content: Hallucinations: Within noraml  Delusions: Within normal  Orientation: X 3  Memory: Impairment  Judgement: No Evidence of Impairment  Estimated Intelligence: Below Average  Demonstrated Insight: Diminished  Fund of Knowledge: adequate        Suicidal/Homicidal Ideation present: None Reported This Session. No SI or HI since last session.       Patient's impression of their current status: Patient endorses shortness of breath, stomach bloating, feeling cold and unable to warm up, pain in back and hands. She stopped attending PT after going twice. She stopped taking Prazosin at bedtime as didn't feel like it helped. Sleeping 2-3 hours per night only. \"Maybe because I don't sleep, I don't have enough energy.\" Denies any fears or worries. Continues to attend school, but reports she doesn't learn anything because of forgetfulness and difficulty sitting for long periods of time.  [place] \"it was like snf- you can't go anywhere.\"      Therapist impression of patients current state: Patient " presented for follow up individual psychotherapy visit. A professional female Yareli , Abril, was present for the visit due to the language complexity. Patient was well groomed and chewing betel nut. She presents with depressed mood and flat affect. Her pain is impacted by the change in weather. She is a poor historian, especially about her medications. It is important to repeat questions and ask in different ways. She has poor medication adherence and follow through on other recommendations from providers, such as how she stopped PT after going twice. She is quick to feel like things don't work. We spent time increasing awareness of her body. We continue to work on acceptance of body aches and pains as well as feeling cold. Explored areas of her body that feel fine. In regards to her English classes, encouraged small, specific goals such as picking one word to focus on at a time and learn.       Type of psychotherapeutic technique provided: Insight oriented and Client centered, ACT, bottom-up approaches: body scans    Progress toward short term goals: Poor progress, stopped PT and Prazosin. Still only getting 2-3 hours per night of sleep.    Review of long term goals: Not done at today's visit. Effective 08/29/2019-11/29/2019    Diagnosis:   1. Persistent depressive disorder with anxious distress, currently moderate    2. PTSD (post-traumatic stress disorder)        Plan and Follow up: Patient is scheduled for follow up psychotherapy on 10/18/19.      Discharge Criteria/Planning: Patient will continue with follow-up until therapy can be discontinued without return of signs and symptoms.    Rae Lentz [facility] 09/27/2019

## 2021-06-01 NOTE — TELEPHONE ENCOUNTER
Prior Authorization Request  Who s requesting:  Pharmacy  Pharmacy Name and Location: [facility]   Medication Name: omeprazole 20 mg  cap  Ins[facility] [facility]  Insurance Member ID Number:  [id number]  Informed patient that prior authorizations can take up to 10 business days for response:   No  Okay to leave a detailed message: No

## 2021-06-01 NOTE — PROGRESS NOTES
Clinic Care Coordination Contact    Follow Up Progress Note      S= Situation: Chart review of clinic care coordination enrollment status   B= Background: Chart review notes [facility] outreach confirmation of  patientreceiving the $70 in SNAP benefits. TPatient completed goals. No new needs identified   A= Action Steps:Outreach in 2 months per protocol. Clinic care coordination will be available for patient before if needed.   R= Recommendation: Transition to maintenance status  Care guide Delegations from RN CC : Please send maintenance plan to patient and outreach per standard work protocol   When a Loved One Has a Mental Illness   It s hard to watch a loved one deal with mental illness. You want to help. Yet you may not know what to do. Your loved one may even push you away. But don t give up. Your support is needed now more than ever. Talk to your loved one s health care provider. Or contact a group for families of people with mental illness. They can help give you the guidance you need.  What you can do  Living with mental illness can be overwhelming. Your loved one may say or do things that shock or frighten you. Sometimes your loved one may resist treatment. Knowing what to do can help you cope:  Help your loved one get proper care. Often people with a mental illness deny there s a problem. Or they may not be able to seek help on their own.  Encourage your loved one to stick with treatment. This may be your most crucial job. Medicines that treat mental illness can have side effects. As a result, your loved one may stop taking them. But this will likely cause symptoms to come back. You might also want to go to healthcare provider visits with your loved one to discuss medicine and other issues.  Provide emotional support. Encourage your loved one to share his or her feelings. Listen and don t . Let your loved one know he or she can count on you.  Be patient. The healing process takes time. In some cases, your  loved one may never fully recover. But his or her symptoms will likely improve.  Ask them to join in. Invite your loved one to take part in activities. But don t push.  Take care of yourself. Helping your loved one can be very stressful. Take time to care for yourself. You ll have more patience and will be better able to cope.    Seeking help  If you are worried your loved one may harm themselves or attempt suicide, ask him or her. Asking doesn t cause suicide.  If the suicide risk is not immediate, call the person s healthcare provider, go to a local mental health clinic, or call the National Suicide Prevention Lifeline at 181-597-XGIX (2871). It is open 24 hours a day, every day. They speak English and [place]. This resource provides immediate crisis intervention and information on local resources. It is free and confidential.   Don t ignore a person's talk of suicide or think it s just an attention-seeking behavior. As hard as it is, talking can save lives.  Call 911  Call 911 if the person is at immediate risk of suicide (he or she has a suicide plan and access to a method such as guns or drugs). Or take the person to the nearest emergency room. Don t leave the person alone. Remove any guns and medicines.   Resources  National [place] on Mental Illness 865-506-5108 www.ingrid.org  National [place] of Mental Health 957-664-3811 www.nimh.nih.gov  Mental Health [place] 180-517-8227 www.nmha.org  National Suicide Prevention Lifeline 567-258-UQKW (8541) www.suicidepreventionlifeline.org        [facility] Mental Health Crisis Lines:  [facility] [phone]  [facility] [phone]  [facility] [phone]  [facility] [phone]  [facility], Adults [phone]  [facility], Children [phone]  [facility], Adults [phone]  [facility], Children [phone]    Emergency Plan Recommendation:    When to Use the Emergency Department (ED)  An emergency means you could die if  you don t get care quickly. Or you could be hurt permanently (disabled). Read below to know when to use -- and when not to use -- an emergency department (also called ED).    Dangers to your life  Here are examples of emergencies. These need immediate care:  A hard time breathing  Severe chest pain  Choking  Severe bleeding  Suddenly not able to move or speak  Blacking out (fainting)`  Poisoning    Dangers of permanent injuries  Here are other emergencies. These also need immediate care:  Deep cuts or severe burns  Broken bones, or sudden severe pain and swelling in a joint    When it s an emergency  If you have an emergency, follow these steps:    1. Go to the nearest emergency department  If you can, go to the hospital ED closest to you right away.  If you cannot get there right away, or if it is not safe to take yourself, call 911 or your police emergency number.  2. Call your primary care doctor  Tell your doctor about the emergency. Call within 24 hours of going to the ED.  If you cannot call, have someone call for you.  Go to your doctor (not the ED) for any follow-up care.    When it s not an emergency  If a problem is not an emergency, follow these steps:    1. Call your primary care doctor  If you don t know the name of your doctor, call your health plan.  If you cannot call, have someone call for you.  2. Follow instructions  Your doctor will tell you what you should do.  You may be told to see your doctor right away. You may be told to go to the ED. Or you may be told to go to an urgent care center.  Follow your doctor s advice.

## 2021-06-01 NOTE — PROGRESS NOTES
Mental Health Visit Note    9/13/2019    Start time: 2:07pm    Stop Time: 2:49pm   Session # 8    Session Type: Patient is presenting for an Individual session.    Silvino Caceres is a 39 y.o. female is being seen today for    Chief Complaint   Patient presents with      Follow Up     session to address coping with depressed mood, inability to sleep and forgetfulness.         New symptoms or complaints: None    Functional Impairment:   Personal: 4  Family: 3  Work: 4  Social: 4    Clinical assessment of mental status: Reviewed. MSE is current effective 9/13/19  Grooming: Well groomed  Attire: Appropriate  Age: Appears Stated  Behavior Towards Examiner: Cooperative  Motor Activity: Within normal  Eye Contact: Appropriate  Mood: Depressed  Affect: Congruent w/content of speech and Flat.  Speech/Language: Within normal  Attention: Within normal  Concentration: Brief  Thought Process: Within normal  Thought Content: Hallucinations: Within noraml  Delusions: Within normal  Orientation: X 3  Memory: Impairment  Judgement: No Evidence of Impairment  Estimated Intelligence: Below Average  Demonstrated Insight: Diminished  Fund of Knowledge: adequate        Suicidal/Homicidal Ideation present: None Reported This Session      Patient's impression of their current status: Patient endorses continued sleep concerns, pain, feeling down.  She is tending classes for English but finds it difficult to learn or remember things.  She still has PT set up for her pain management.  She does not feel like things are improving or changing.      Therapist impression of patients current state: Patient presented for follow up individual psychotherapy visit. A professional Yareli  was present for the visit due to the language complexity.  She presents with depressed mood and flat affect.  She reports implementing skills outside of session but is not seeing any improvement.  Historically she does not report any improvement with use of  skills, medication, additional interventions.  This is not only with her mental health symptoms but also her physical health conditions.  She continues to be somewhat distant and guarded. We continue to work on engagement and building therapeutic trusting relationship      Type of psychotherapeutic technique provided: Insight oriented and Client centered, solution focused    Progress toward short term goals: Poor progress, continued problems sleeping. Not noticing improvement.     Review of long term goals: Long-term goals reviewed ,signed and scanned into EMR. Effective 08/29/2019-11/29/2019    Diagnosis:   1. Persistent depressive disorder with anxious distress, currently moderate    2. PTSD (post-traumatic stress disorder)    3. Insomnia, unspecified type        Plan and Follow up: Patient is scheduled for follow up psychotherapy on 9/27/19.      Discharge Criteria/Planning: Patient will continue with follow-up until therapy can be discontinued without return of signs and symptoms.    Rae Lentz [facility] 9/13/2019

## 2021-06-01 NOTE — TELEPHONE ENCOUNTER
Central PA team  [phone]  Pool: HE PA MED ([id number])          PA has been initiated.       PA form completed and faxed insurance via Cover My Meds     Key:  [id number]       Medication:  Omeprazole 20MG dr capsules      Insurance:  [employer]        Response will be received via fax and may take up to 5-10 business days depending on plan

## 2021-06-02 VITALS — BODY MASS INDEX: 23.41 KG/M2 | HEIGHT: 61 IN | WEIGHT: 124 LBS

## 2021-06-02 VITALS — WEIGHT: 119.5 LBS | BODY MASS INDEX: 22.58 KG/M2

## 2021-06-02 NOTE — TELEPHONE ENCOUNTER
Attempt call 2. Left message on patients voice to return call to clinic via Language line  ID [id number].

## 2021-06-02 NOTE — PROGRESS NOTES
Mental Health Visit Note    10/18/2019    Start time: 2:10pm    Stop Time: 2:52pm   Session # 10    Session Type: Patient is presenting for an Individual session.    Silvino Caceres is a 39 y.o. female is being seen today for    Chief Complaint   Patient presents with      Follow Up     coping with nightmares       New symptoms or complaints: None    Functional Impairment:   Personal: 4  Family: 3  Work: 4  Social: 4    Clinical assessment of mental status: Reviewed. MSE is current effective 10/18/19  Grooming: Well groomed  Attire: Appropriate  Age: Appears Stated  Behavior Towards Examiner: Cooperative  Motor Activity: Within normal  Eye Contact: Appropriate  Mood: Depressed  Affect: Congruent w/content of speech and Flat.  Speech/Language: Within normal  Attention: Within normal  Concentration: Brief  Thought Process: Within normal  Thought Content: Hallucinations: Within noraml  Delusions: Within normal  Orientation: X 3  Memory: Impairment  Judgement: No Evidence of Impairment  Estimated Intelligence: Below Average  Demonstrated Insight: Diminished  Fund of Knowledge: adequate        Suicidal/Homicidal Ideation present: None Reported This Session      Patient's impression of their current status: Screaming in sleep. Nightmares about [place] and [place]- burning villages, sick in [place] from taking medications, almost  from diarrhea at that time.   Low energy. Doesn't like the cold weather. Don't like physical activity or moving body too much. Still aches and pains in body. Still not taking medications or attending PT.   Her val has played a strong role in her survival during the war and continues to play an important role in helping her cope.       Therapist impression of patients current state: Patient presented for follow up individual psychotherapy visit. A professional female Yareli  was present for the visit due to the language complexity. Patient was engaged in session; however, she  often declines recommendations or use of coping skills that would be beneficial outside of session. Her past experience in [facility] with being ill after taking medications has impacted her view of medication. She was able to engage in some memory processing while staying within the window of tolerance- no evidence of dissociation or hyperarousal. It will be challenging for her to find improvement when she is not open to taking medications or implementing skills encouraged in session, such as grounding skills explored today, and more somatic/body approaches encouraged over time by this provider. Provider continues to provide education on why these are proven to be effective. More understanding or motivation to change may be beneficial.     Type of psychotherapeutic technique provided: Insight oriented and Client centered, memory processing    Progress toward short term goals: Poor progress, not implementing skills or adhering to medications. Increased nightmares-wakes up screaming most nights.     Review of long term goals: Not done at today's visit. Effective 08/29/2019-11/29/2019    Diagnosis:   1. Persistent depressive disorder with anxious distress, currently moderate        Plan and Follow up: Patient is scheduled for follow up psychotherapy on 11/05/19.      Discharge Criteria/Planning: Patient will continue with follow-up until therapy can be discontinued without return of signs and symptoms.    Rae Lentz [facility] 10/18/2019

## 2021-06-02 NOTE — TELEPHONE ENCOUNTER
Called patient via  [employer] ID# [id number].  had miscommunication with patient to return call to us. Please relay providers message if patient does return call. I will re try to contact patient again.

## 2021-06-02 NOTE — TELEPHONE ENCOUNTER
3rd attempt call. Left message to relay message via language line ID# [id number]. Okay to relay providers message.    Letter okay?

## 2021-06-02 NOTE — TELEPHONE ENCOUNTER
Let pt know that her omeprazole won't be covered by insurance anymore (at least until the new year?)  Options:  -stop the medicine and see how things go  -buy the medicine over-the-counter  -try a different medicine, that may not work as well    Let me know what she wants to do

## 2021-06-03 VITALS — HEIGHT: 61 IN | WEIGHT: 122 LBS | BODY MASS INDEX: 23.03 KG/M2

## 2021-06-03 VITALS — WEIGHT: 118 LBS | BODY MASS INDEX: 22.3 KG/M2

## 2021-06-03 VITALS — WEIGHT: 117.75 LBS | BODY MASS INDEX: 22.23 KG/M2 | HEIGHT: 61 IN

## 2021-06-03 VITALS — BODY MASS INDEX: 22.85 KG/M2 | WEIGHT: 120.25 LBS

## 2021-06-03 NOTE — PROGRESS NOTES
: Livier ID #: [id number] Agency: Language Line    Scheduled Follow Up Call:   Attempt 1 Community Health Worker called and left a message for the patient. If the patient is returning my call, please transfer the patient to Orion Brock at ext. [phone].    Upcoming Appointments:  11/19/19 at 3:00 with Rae Reece Outreach: 11/19/19

## 2021-06-03 NOTE — PROGRESS NOTES
"  Mental Health Visit Note    11/19/2019    Start time: 3:09pm    Stop Time: 4:02pm   Session # 12    Session Type: Patient is presenting for an Individual session.    Silvino Caceres is a 39 y.o. female is being seen today for    Chief Complaint   Patient presents with      Follow Up     coping with insomnia and feeling down.        New symptoms or complaints: None    Functional Impairment:   Personal: 4  Family: 3  Work: 4  Social: 4    Clinical assessment of mental status: Reviewed. MSE is current effective 11/19/19  Grooming: Well groomed  Attire: Appropriate  Age: Appears Stated  Behavior Towards Examiner: Guarded/Evasive  Motor Activity: Within normal  Eye Contact: Appropriate  Mood: Depressed  Affect: Congruent w/content of speech and Flat.  Speech/Language: Within normal  Attention: Within normal  Concentration: Brief  Thought Process: Within normal  Thought Content: Hallucinations: Within noraml  Delusions: Within normal  Orientation: X 3  Memory: Impairment  Judgement: No Evidence of Impairment  Estimated Intelligence: Below Average  Demonstrated Insight: Diminished  Fund of Knowledge: adequate      Suicidal/Homicidal Ideation present: None Reported This Session      Patient's impression of their current status: She reports concerns about her aunt who is back in the hospital.  Spending time at the hospital with her aunt. 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   \"I have not been sleeping.\"  She reports screaming a lot in her sleep.  Endorsed pain concerns but is not doing anything to manage the pain.  Currently not taking any medications.    Therapist impression of patients current state: Patient presented for follow up individual psychotherapy visit. A professional male Yareli RAVEN from [employer], was present for the visit due to the language complexity.  Her new [employer] worker, Cynthia Davidson, from [employer] was also present. Patient signed JAEL for [employer] worker. Patient " continues to present with depressed mood, flat affect, low energy. She was guarded in session.  [facility] worker stated patient is guarded during their visits as well. Therapist and patient reviewed treatment goals in order for [facility] worker to know patient's goals, symptoms, and areas for improvement. Explored ways [facility] could best support patient and managing her mental health in the community. Specifically encouraged [facility] worker and patient to work on sleep hygiene skills. Encouraged increasing activities. SULEIMAN will be helpful with this as she reports plans to bring patient to [facility] and other outings. Increasing activities will likely increase her energy, motivation, and mood.     Patient continues to struggle with follow-through on recommendations, Prior to next session she was encouraged to see her physician in regards to her pain.  She reports her pain symptoms are what are preventing her from work.  However, she is not engaging in any activities to help manage the symptoms in order to find improvements so that she can improve daily functioning such as seeking employment.    Clinical Global Impressions Scale Ratings:    1. Considering your total clinical experience with this particular population, how mentally ill is the patient at this time? (which is rated on the following seven-point scale: 1=normal, not at all ill; 2=borderline mentally ill; 3=mildly ill; 4=moderately ill; 5=markedly ill; 6=severely ill; 7=among the most extremely ill patients:) score of 5 today    2. Compared to the patient's condition at admission to the program, currently this patient's condition is: (1=very much improved since the initiation of treatment; 2=much improved; 3=minimally improved; 4=no change from baseline (the initiation of treatment); 5=minimally worse; 6= much worse; 7=very much worse since the initiation of treatment:) score of 3 today      Type of psychotherapeutic technique provided: Insight oriented and  Client centered, motivational interviewing    Progress toward short term goals: Poor progress in regards to managing physical and mental health symptoms.   Good progress in regards to establishing with [facility] worker and keeping their visits.        Review of long term goals: Not done at today's visit. Effective 08/29/2019-11/29/2019    Diagnosis:   1. Persistent depressive disorder with anxious distress, currently moderate        Plan and Follow up: Patient is scheduled for follow up psychotherapy on 12/10/19.      Discharge Criteria/Planning: Patient will continue with follow-up until therapy can be discontinued without return of signs and symptoms.    Rae Lentz [facility] 11/19/2019

## 2021-06-03 NOTE — PROGRESS NOTES
"Mental Health Visit Note    11/05/2019    Start time: 3:12pm    Stop Time: 3:47pm   Session # 11    Session Type: Patient is presenting for an Individual session.    Silvino Caceres is a 39 y.o. female is being seen today for    Chief Complaint   Patient presents with      Follow Up     coping with heavy heart, trouble sleeping.       New symptoms or complaints: None    Functional Impairment:   Personal: 4  Family: 3  Work: 4  Social: 4    Clinical assessment of mental status: Reviewed. MSE is current effective 11/5/19  Grooming: Well groomed  Attire: Appropriate  Age: Appears Stated  Behavior Towards Examiner: Cooperative  Motor Activity: Within normal  Eye Contact: Appropriate  Mood: Depressed  Affect: Congruent w/content of speech and Flat.  Speech/Language: Within normal  Attention: Within normal  Concentration: Brief  Thought Process: Within normal  Thought Content: Hallucinations: Within noraml  Delusions: Within normal  Orientation: X 3  Memory: Impairment  Judgement: No Evidence of Impairment  Estimated Intelligence: Below Average  Demonstrated Insight: Diminished  Fund of Knowledge: adequate      Suicidal/Homicidal Ideation present: None Reported This Session      Patient's impression of their current status: She has noticed a decrease in her activity because of the cold weather.  She is going to her school classes every day, but finds it hard to remember the things she is learning.  She likes going to school to have activity in her day.  \"I cannot stay home all day.\"  She expresses a desire to look for another job but believes her health impacts her ability to work due to lack of sleep and pain in her right shoulder and arm and leg.  She reports she has a weak  and cannot lift heavy and also has back pain.  She has never applied for disability and at this point is not planning to.  She continues to struggle with sleep at night and reports she is not sleeping in the day.  She denies any fears or feeling " "scared to go to sleep.  \"I just cannot sleep.\"  She reports nightmares approximately 2-3 times a week.     Therapist impression of patients current state: Patient presented for follow up individual psychotherapy visit. A professional male Yareli , Kelly, was present for the visit due to the language complexity.  Patient continues to present with depressed mood, flat affect, low energy.  She lacks motivation and willingness to participate in creating changes in her life.  Some examples include not being open to see her primary care physician about the reported health impacts that she believes impact her ability to work, not taking her medications, stopping physical therapy, trying things a few times that may help with pain and quitting after short attempts including yoga and exercise and massage.  She has had similar experiences with this sleep hygiene skills and things we have tried to help improve her sleep where she does not engage in prolonged practice and was stopped after a couple of attempts.  At this point therapist wonders if an [facility] worker would be the most helpful for her to come into her home and work more closely with her on some things.  Therapist had made a referral about 6 months ago to [facility] and agree discussed this with patient today.  Patient then stated that occur when speaking woman came to her house recently and plans on coming back out tomorrow.  She was unable to identify the company or title of this person, but will gather the information tomorrow during the visit.  Based on what she reports it sounds like an [facility] worker and it may be follow-up from the [employer] referral that was completed by this provider as they did have a long wait list.  Therapist will gather this information at next session with patient.      Clinical Global Impressions Scale Ratings:    1. Considering your total clinical experience with this particular population, how mentally ill is the patient at this " time? (which is rated on the following seven-point scale: 1=normal, not at all ill; 2=borderline mentally ill; 3=mildly ill; 4=moderately ill; 5=markedly ill; 6=severely ill; 7=among the most extremely ill patients:) score of 5 today    2. Compared to the patient's condition at admission to the program, currently this patient's condition is: (1=very much improved since the initiation of treatment; 2=much improved; 3=minimally improved; 4=no change from baseline (the initiation of treatment); 5=minimally worse; 6= much worse; 7=very much worse since the initiation of treatment:) score of 4 today        Type of psychotherapeutic technique provided: Insight oriented and Client centered, motivational interviewing    Progress toward short term goals: Poor progress, declining recommendations, stopped medications. Lacking motivation and willingness to change.     Review of long term goals: Not done at today's visit. Effective 08/29/2019-11/29/2019    Diagnosis:   1. PTSD (post-traumatic stress disorder)    2. Persistent depressive disorder with anxious distress, currently moderate        Plan and Follow up: Patient is scheduled for follow up psychotherapy on 11/19/19.      Discharge Criteria/Planning: Patient will continue with follow-up until therapy can be discontinued without return of signs and symptoms.    Rae Lentz [facility] 11/05/2019

## 2021-06-03 NOTE — TELEPHONE ENCOUNTER
Medication Question or Clarification  Who is calling: [facility]: [facility]  What medication are you calling about? (include dose and sig)   ranitidine (ZANTAC) 300 MG tablet 90 tablet 3 11/27/2019  No   Sig - Route: Take 1 tablet (300 mg total) by mouth at bedtime. - Oral     Who prescribed the medication?: Maggie Rowe MD   What is your question/concern?:  this medication is not available, please send alternative  [facility]: [facility]  Okay to leave a detailed message?: Yes  Site CMT - Please call the pharmacy to obtain any additional needed information.

## 2021-06-03 NOTE — PROGRESS NOTES
Clinic Care Coordination Contact    Assessment: Care Coordinator contacted patient for 2 month follow up.  Patient has continued to follow the plan of care and assessment is negative for any new needs or concerns.    Enrollment status: Graduated.      Plan: No further outreaches at this time.  Patient will continue to follow the plan of care.  If new needs arise a new Care Coordination referral may be placed.  FYI to PCP    Depression  Everyone feels down at times. The blues are a natural part of life. But an unhappy period that s intense or lasts for more than a couple of weeks can be a sign of depression. Depression is a serious illness. It can disrupt the lives of family and friends. If you know someone you think may be depressed, find out what you can do to help.    Know the serious signals  Warning signals for suicide include:    Threats or talk of suicide    Statements such as  I won t be a problem much longer  or  Nothing matters     Giving away possessions or making a will or  arrangements    Buying a gun or other weapon    Sudden, unexplained cheerfulness or calm after a period of depression  If you notice any of these signs, get help right away. Call a health care professional, mental health clinic, or suicide hotline and ask what action to take. In an emergency, don t hesitate to call the police.    [facility] Mental Health Crisis Lines:  [facility] [phone]  [facility] [phone]  [facility] [phone]  [facility] [phone]  [facility], Adults [phone]  [facility], Children [phone]  [facility], Adults [phone]  [facility], Children [phone]  Understanding Post-Traumatic Stress Disorder (PTSD)  You may have post-traumatic stress disorder (PTSD) if you ve been through a traumatic event and are having trouble dealing with it. Such events may include a car crash, rape, domestic violence,  combat, or violent crime. While it is  normal to have some anxiety after such an event, it usually goes away in time. But with PTSD, the anxiety is more intense and keeps coming back. And the trauma is relived through nightmares, intrusive memories, and flashbacks (vivid memories that seem real). The symptoms of PTSD can cause problems with relationships and make it hard to cope with daily life. But it can be treated. With help, you can feel better.  How does it feel?  Symptoms of PTSD often start within a few months of the event. Here are some common symptoms:  You startle more easily, and feel anxious and on edge all the time. This can lead to sleep problems. It a can cause you to feel overwhelmed or become angry or upset more easily. Panic attacks (sudden, intense feelings of terror and a strong need to escape from wherever you are) can also occur.  You relive the event through nightmares and flashbacks. During these, you may feel strong emotions and as though you re reliving the event all over again.  You avoid people, places, or activities that remind you of the trauma. You may hold in your feelings and become emotionally numb. It may be hard to concentrate at work or school or to relax with friends. You may be afraid to let people get close to you.  Who does it affect?  Not everyone who survives a trauma will have PTSD. But many will. In fact, millions of people have the condition. PTSD can happen to anyone, but it most often develops after a person feels his or her or another s life is threatened.  You re at risk for PTSD if you have experienced or witnessed:  A rape or sexual abuse  A mugging or carjacking  A car accident or plane crash  A life-threatening illness  War  Domestic violence  Childhood abuse  Natural disasters such as earthquakes, floods, or hurricanes  The sudden death of a loved one  Finding help  The first step is to talk to a trusted counselor or healthcare provider. He or she can help you take the next step to treatment. This  may involve therapy (also called counseling) and medication.  Are you having suicidal thoughts?  You may be feeling helpless, hopeless, and that you can t go on. You may even have thoughts of suicide. But help is available. There are ways to ease this pain and manage the problems in your life.  If you are thinking about harming or killing yourself, please tell your healthcare provider or someone you care about immediately or go to the nearest crisis walk-in center or emergency room.  You can also call, toll-free,  800-SUICIDE (353-175-4907)   [phone] ([phone])     Resources  American Psychiatric Association  311.866.8256  www.healthyminds.org  American Psychological Association  www.apa.org/helpcenter  Anxiety and Depression Association of [place]  www.adaa.org  Mental Health [place]  www.nmha.org  National Center for PTSD  www.ptsd.va.gov  National Alligator of Mental Health  www.nimh.nih.gov/topics/ucqfy-hhre-ymyi.shtml  National Suicide Prevention Lifeline  490.959.7400 (275-659-YCCM)  Www.suicidepreventionlifeline.org   When a Loved One Has a Mental Illness   It s hard to watch a loved one deal with mental illness. You want to help. Yet you may not know what to do. Your loved one may even push you away. But don t give up. Your support is needed now more than ever. Talk to your loved one s health care provider. Or contact a group for families of people with mental illness. They can help give you the guidance you need.  What you can do  Living with mental illness can be overwhelming. Your loved one may say or do things that shock or frighten you. Sometimes your loved one may resist treatment. Knowing what to do can help you cope:  Help your loved one get proper care. Often people with a mental illness deny there s a problem. Or they may not be able to seek help on their own.  Encourage your loved one to stick with treatment. This may be your most crucial job. Medicines that treat mental illness can have  side effects. As a result, your loved one may stop taking them. But this will likely cause symptoms to come back. You might also want to go to healthcare provider visits with your loved one to discuss medicine and other issues.  Provide emotional support. Encourage your loved one to share his or her feelings. Listen and don t . Let your loved one know he or she can count on you.  Be patient. The healing process takes time. In some cases, your loved one may never fully recover. But his or her symptoms will likely improve.  Ask them to join in. Invite your loved one to take part in activities. But don t push.  Take care of yourself. Helping your loved one can be very stressful. Take time to care for yourself. You ll have more patience and will be better able to cope.    Seeking help  If you are worried your loved one may harm themselves or attempt suicide, ask him or her. Asking doesn t cause suicide.  If the suicide risk is not immediate, call the person s healthcare provider, go to a local mental health clinic, or call the National Suicide Prevention Lifeline at 730-287-AKXD (2132). It is open 24 hours a day, every day. They speak English and [place]. This resource provides immediate crisis intervention and information on local resources. It is free and confidential.   Don t ignore a person's talk of suicide or think it s just an attention-seeking behavior. As hard as it is, talking can save lives.  Call 911  Call 911 if the person is at immediate risk of suicide (he or she has a suicide plan and access to a method such as guns or drugs). Or take the person to the nearest emergency room. Don t leave the person alone. Remove any guns and medicines.   Resources  National [place] on Mental Illness 749-019-0697 www.ingrid.org  National [place] of Mental Health 832-261-9458 www.nimh.nih.gov  Mental Health [place] 031-199-8451 www.nmha.org  National Suicide Prevention Lifeline 460-720-KCYK (0950)  www.suicidepreventionlifeline.org        [facility] Mental Health Crisis Lines:  [facility] [phone]  [facility] [phone]  [facility] [phone]  [facility] [phone]  [facility], Adults [phone]  [facility], Children [phone]  [facility], Adults [phone]  [facility], Children [phone]  Emergency Plan Recommendation:    When to Use the Emergency Department (ED)  An emergency means you could die if you don t get care quickly. Or you could be hurt permanently (disabled). Read below to know when to use -- and when not to use -- an emergency department (also called ED).    Dangers to your life  Here are examples of emergencies. These need immediate care:  A hard time breathing  Severe chest pain  Choking  Severe bleeding  Suddenly not able to move or speak  Blacking out (fainting)`  Poisoning    Dangers of permanent injuries  Here are other emergencies. These also need immediate care:  Deep cuts or severe burns  Broken bones, or sudden severe pain and swelling in a joint    When it s an emergency  If you have an emergency, follow these steps:    1. Go to the nearest emergency department  If you can, go to the hospital ED closest to you right away.  If you cannot get there right away, or if it is not safe to take yourself, call 911 or your police emergency number.  2. Call your primary care doctor  Tell your doctor about the emergency. Call within 24 hours of going to the ED.  If you cannot call, have someone call for you.  Go to your doctor (not the ED) for any follow-up care.    When it s not an emergency  If a problem is not an emergency, follow these steps:    1. Call your primary care doctor  If you don t know the name of your doctor, call your health plan.  If you cannot call, have someone call for you.  2. Follow instructions  Your doctor will tell you what you should do.  You may be told to see your doctor right away. You may be told to go to the  ED. Or you may be told to go to an urgent care center.  Follow your doctor s advice.

## 2021-06-03 NOTE — PROGRESS NOTES
: Kyle ID #: [id number] Agency: Language Line    The CHW called the patient to check in after she has been in Maintenance. The patient has no new goals and she does not have any new concerns at this time. The CHW was able to talk to her about any new concerns and at this time there are none. The CHW explained Graduation to the patient and she stated that she was okay with moving to Graduation. The CHW informed her that this should be the last call and that a Graduation plan should be sent. The CHW also reminded the patient about her upcoming appointment with her Mental Health provider.     The encounter was routed to the RN to complete a Chart review for Graduation.    Upcoming Appointments:  11/19/19 at 3:00 with Rae Lentz    Next Outreach: 12/3/19

## 2021-06-03 NOTE — TELEPHONE ENCOUNTER
Johnna, you ordered some zantac for this patient but as you may recall, this medication is on recall. Are you willing to prescribe an alternative? Thank you.

## 2021-06-03 NOTE — PROGRESS NOTES
Subjective:      Silvino Caceres is a 39 y.o. female who presents for evaluation of chronic shoulder pain.  I last saw her for this concern in August 2019.  She has been discussing this with her therapist at her mental health visits.  She is taking Tylenol for pain right now.  She did not follow-up with physical therapy as referred after her last visit.  No changes in her arm pain, except to report that sometimes pain seems to go down toward her elbow.    She notes that she occasionally has a hard time sleeping.  It seems that sleep has improved since her last visit.  She was taking 2 mg of prazosin and that did not seem to help her sleep at all.  Will follow up with therapist regarding this.    She is wondering if she can get a refill for a medicine that help with her itching.  She says she has some white vaginal discharge and also itching all over her body.  This happens after her period.  Sometime in the past she had a time where she took 1 pill a week for several weeks and that resolved her symptoms for a very long time.  In reviewing her chart, she did have a time where she took 150 mg fluconazole once a month for a few months at a time.  Last prescription appears to be in April 2019.  Wet prep and gonorrhea chlamydia testing at that time in April was all negative, I reviewed results today.    Additionally, she had asked for refill of omeprazole previously.  Received notification from her insurance company that they would not pay for daily use, only 120 (?) pills a year.  I believe that she would be able to restart this in January.  She notes that her GERD symptoms have worsened since she stopped taking the medicine.    Patient Active Problem List   Diagnosis     Insomnia, unspecified type     Anemia     Hepatitis B     Hyperlipidemia     Vitamin D deficiency     Severe episode of recurrent major depressive disorder, with psychotic features (H)     PTSD (post-traumatic stress disorder)     Persistent depressive  disorder with anxious distress, currently moderate     Chronic pain of both shoulders     Gastroesophageal reflux disease without esophagitis       Current Outpatient Medications:      acetaminophen (TYLENOL EXTRA STRENGTH) 500 MG tablet, Take 1 tablet (500 mg total) by mouth every 6 (six) hours as needed for pain., Disp: 100 tablet, Rfl: 0     azelastine (OPTIVAR) 0.05 % ophthalmic solution, One drop in each eye 1-2 times a day, as needed., Disp: 6 mL, Rfl: 12     cholecalciferol, vitamin D3, (VITAMIN D3) 2,000 unit Tab, Take 1 tablet (2,000 Units total) by mouth daily., Disp: 100 tablet, Rfl: 1     hydrocortisone (ANUSOL-HC) 2.5 % rectal cream, Apply topically to affected area BID - no longer than 14 days, Disp: 30 g, Rfl: 1     miconazole (MICATIN) 2 % cream, Apply to affected area 2 times daily, Disp: 15 g, Rfl: 1     fluconazole (DIFLUCAN) 150 MG tablet, Take 1 tablet (150 mg total) by mouth once for 1 dose., Disp: 1 tablet, Rfl: 0     ranitidine (ZANTAC) 300 MG tablet, Take 1 tablet (300 mg total) by mouth at bedtime., Disp: 90 tablet, Rfl: 3     Objective:     No Known Allergies  Vitals:    11/27/19 0857   BP: 94/68   Pulse: 80     Body mass index is 22.99 kg/m .    Vitals reviewed as above.  General: Patient is alert and oriented x 3, in no apparent distress  Cardiac: Regular rate and rhythm, no murmurs  Pulmonary: lungs clear to ausculation, no crackles, rales, rhonchi, or wheezing  Musculoskeletal: normal 4/5 strength in major muscle groups in upper extremities bilaterally, normal  strength laterally, normal bilateral radial pulses, age of motion in bilateral shoulders is fairly normal, mainly movement is limited when raising arm from 90 degrees to above her head above shoulder level  No ligament laxity noted in either shoulder, mild pain with direct palpation of right shoulder, no pain with direct palpation of left shoulder    Assessment and Plan:     1. Chronic pain of both shoulders  Continue with  Tylenol.  Patient declines referral to physical therapy at this time.  I reviewed with her that if she wished to pursue some further treatment for this, or disability, she would need to have a physical therapy assessment at some time.  She declines this today.  She also feels like in the winter, when the weather is bad, she has difficulty getting transportation for her physical therapy appointments.  She is wondering if they can come to her home.  I reviewed I am not sure that could happen.  Patient states she has a  that says that they can do that.  I think if  can arrange this that would be fine.    2. Insomnia, unspecified type  2 mg prazosin did not help with sleep.  She seems to imply today that sleep has improved somewhat.  Continue to monitor.  We will follow-up with her mental health provider to discuss.    3. Gastroesophageal reflux disease without esophagitis  I reviewed with her the prior authorization issues regarding her omeprazole.  I discussed we could have her try another medicine starting now, or have her wait until possibly January to restart omeprazole.  She initially said she wanted to wait to start omeprazole in January.  Then she said she would rather start another medicine now.  I believe the restriction includes all PPIs.  Zantac is on recall.  Short-term prescription sent for pepcid.    4. Itching  Oral fluconazole sent for patient today.  Wet prep and gonorrhea chlamydia in April 2019 were negative.  If symptoms persist, would request further follow-up visit.  - fluconazole (DIFLUCAN) 150 MG tablet; Take 1 tablet (150 mg total) by mouth once for 1 dose.  Dispense: 1 tablet; Refill: 0    This dictation uses voice recognition software, which may contain typographical errors.

## 2021-06-03 NOTE — PROGRESS NOTES
My Clinic Care Coordination Wellness Plan  This Graduation Wellness Plan provides private information in regard to the work I have done with my Care Team at my Primary Care Clinic.  This document provides insight on the goals I have accomplished.  My Care Team congratulates me on my journey to maintain wellness.  This document will help guide me on my journey to maintain a healthy lifestyle.  I will use this to help me overcome any barriers I may encounter.  If I should have any questions or concerns, I will contact the members of my Care Team or contact my Primary Care Clinic for assistance.     [address]  [id number] [phone]    My Preferred Method of Contact:  Phone: [phone]    My Primary/Preferred Language:  Yareli    Preferred Learning Style:  Face to face discussion, Pictures/Diagrams and Hands on teaching    Emergency Contact: Extended Emergency Contact Information  Primary Emergency Contact: Giorgio Zarate  Address: [address] [place] [address] [place] [address] [facility]  Home Phone: [phone]  Relation: Cousin  Secondary Emergency Contact: Keila White   [place]  Mobile Phone: [phone]  Relation: Friend     PCP:  Maggie Rowe MD  Specialists:    Care Team            Maggie Rowe MD   [phone] PCP - General, Family Medicine    Rae Lentz, [facility]   [phone] Licensed Clinical     psychotherapist    Johnna Morales PA-C   [phone] Assigned PCP          Accomplishments:  Goals        Patient Stated      COMPLETED: Financial Wellbeing (pt-stated)      Goal Statement- I know that I'm eligible for food support.    Personal Plan  I will make sure that I talk to my Financial Worker at the [facility] if there are any concerns. I will make sure that I fill out all of my renewal paperwork and send it back on time.        COMPLETED: I don't want waivered services or PCA services (pt-stated)      I  discussed county benefits with [facility] on 19 and clarified I would like food support, not waiver supports       Date goal set: 19   Updated and completed: 19 BC              Advanced Directive/Living Will: The patient was given information regarding Adanced Directives/Living Will     Emergency Plan:    Depression  Everyone feels down at times. The blues are a natural part of life. But an unhappy period that s intense or lasts for more than a couple of weeks can be a sign of depression. Depression is a serious illness. It can disrupt the lives of family and friends. If you know someone you think may be depressed, find out what you can do to help.     Know the serious signals  Warning signals for suicide include:    Threats or talk of suicide    Statements such as  I won t be a problem much longer  or  Nothing matters     Giving away possessions or making a will or  arrangements    Buying a gun or other weapon    Sudden, unexplained cheerfulness or calm after a period of depression  If you notice any of these signs, get help right away. Call a health care professional, mental health clinic, or suicide hotline and ask what action to take. In an emergency, don t hesitate to call the police.     [facility] Mental Health Crisis Lines:  [facility] [phone]  [facility] [phone]  [facility] [phone]  [facility] [phone]  [facility], Adults [phone]  [facility], Children [phone]  [facility] [place], Adults [phone]  [facility] [place], Children [phone]  Understanding Post-Traumatic Stress Disorder (PTSD)  You may have post-traumatic stress disorder (PTSD) if you ve been through a traumatic event and are having trouble dealing with it. Such events may include a car crash, rape, domestic violence,  combat, or violent crime. While it is normal to have some anxiety after such an event, it usually goes away in time. But with PTSD, the anxiety is  more intense and keeps coming back. And the trauma is relived through nightmares, intrusive memories, and flashbacks (vivid memories that seem real). The symptoms of PTSD can cause problems with relationships and make it hard to cope with daily life. But it can be treated. With help, you can feel better.  How does it feel?  Symptoms of PTSD often start within a few months of the event. Here are some common symptoms:    You startle more easily, and feel anxious and on edge all the time. This can lead to sleep problems. It a can cause you to feel overwhelmed or become angry or upset more easily. Panic attacks (sudden, intense feelings of terror and a strong need to escape from wherever you are) can also occur.    You relive the event through nightmares and flashbacks. During these, you may feel strong emotions and as though you re reliving the event all over again.    You avoid people, places, or activities that remind you of the trauma. You may hold in your feelings and become emotionally numb. It may be hard to concentrate at work or school or to relax with friends. You may be afraid to let people get close to you.  Who does it affect?  Not everyone who survives a trauma will have PTSD. But many will. In fact, millions of people have the condition. PTSD can happen to anyone, but it most often develops after a person feels his or her or another s life is threatened.  You re at risk for PTSD if you have experienced or witnessed:    A rape or sexual abuse    A mugging or carjacking    A car accident or plane crash    A life-threatening illness    War    Domestic violence    Childhood abuse    Natural disasters such as earthquakes, floods, or hurricanes    The sudden death of a loved one  Finding help  The first step is to talk to a trusted counselor or healthcare provider. He or she can help you take the next step to treatment. This may involve therapy (also called counseling) and medication.  Are you having suicidal  thoughts?  You may be feeling helpless, hopeless, and that you can t go on. You may even have thoughts of suicide. But help is available. There are ways to ease this pain and manage the problems in your life.  If you are thinking about harming or killing yourself, please tell your healthcare provider or someone you care about immediately or go to the nearest crisis walk-in center or emergency room.  You can also call, toll-free,    800-SUICIDE ([phone])     [phone] ([phone])      Resources    American Psychiatric Association  [phone]  www.healthyminds.org    American Psychological Association  www.apa.org/helpcenter    Anxiety and Depression Association of [place]  www.adaa.org    Mental Health [place]  www.nmha.org    National Center for PTSD  www.ptsd.va.gov    National [place] of Mental Health  www.nimh.nih.gov/topics/vpgit-dnnc-epke.shtml  National Suicide Prevention Lifeline  682.513.9522 (515-571-BSSN)  Www.suicidepreventionlifeline.org   When a Loved One Has a Mental Illness   It s hard to watch a loved one deal with mental illness. You want to help. Yet you may not know what to do. Your loved one may even push you away. But don t give up. Your support is needed now more than ever. Talk to your loved one s health care provider. Or contact a group for families of people with mental illness. They can help give you the guidance you need.  What you can do  Living with mental illness can be overwhelming. Your loved one may say or do things that shock or frighten you. Sometimes your loved one may resist treatment. Knowing what to do can help you cope:    Help your loved one get proper care. Often people with a mental illness deny there s a problem. Or they may not be able to seek help on their own.    Encourage your loved one to stick with treatment. This may be your most crucial job. Medicines that treat mental illness can have side effects. As a result, your loved one may stop taking them.  But this will likely cause symptoms to come back. You might also want to go to healthcare provider visits with your loved one to discuss medicine and other issues.    Provide emotional support. Encourage your loved one to share his or her feelings. Listen and don t . Let your loved one know he or she can count on you.    Be patient. The healing process takes time. In some cases, your loved one may never fully recover. But his or her symptoms will likely improve.    Ask them to join in. Invite your loved one to take part in activities. But don t push.    Take care of yourself. Helping your loved one can be very stressful. Take time to care for yourself. You ll have more patience and will be better able to cope.       Seeking help    If you are worried your loved one may harm themselves or attempt suicide, ask him or her. Asking doesn t cause suicide.    If the suicide risk is not immediate, call the person s healthcare provider, go to a local mental health clinic, or call the National Suicide Prevention Lifeline at 924-850-FMAW (2317). It is open 24 hours a day, every day. They speak English and Mongolian. This resource provides immediate crisis intervention and information on local resources. It is free and confidential.     Don t ignore a person's talk of suicide or think it s just an attention-seeking behavior. As hard as it is, talking can save lives.  Call 911    Call 911 if the person is at immediate risk of suicide (he or she has a suicide plan and access to a method such as guns or drugs). Or take the person to the nearest emergency room. Don t leave the person alone. Remove any guns and medicines.   Resources    National [place] on Mental Illness 582-389-8604 www.ingrid.org    National [place] of Mental Health 926-252-7200 www.nimh.nih.gov    Mental Health [place] 008-697-1073 www.nmha.org    National Suicide Prevention Lifeline 531-990-HNBM (7714) www.suicidepreventionlifeline.org           [facility]  Mental Health Crisis Lines:  [facility] [phone]  [facility] [phone]  [facility] [phone]  [facility] [phone]  [facility], Adults [phone]  [facility], Children [phone]  [facility] [place], Adults [phone]  [facility] [place], Children [phone]  Emergency Plan Recommendation:     When to Use the Emergency Department (ED)  An emergency means you could die if you don t get care quickly. Or you could be hurt permanently (disabled). Read below to know when to use -- and when not to use -- an emergency department (also called ED).     Dangers to your life  Here are examples of emergencies. These need immediate care:  A hard time breathing  Severe chest pain  Choking  Severe bleeding  Suddenly not able to move or speak  Blacking out (fainting)`  Poisoning     Dangers of permanent injuries  Here are other emergencies. These also need immediate care:  Deep cuts or severe burns  Broken bones, or sudden severe pain and swelling in a joint     When it s an emergency  If you have an emergency, follow these steps:     1. Go to the nearest emergency department  If you can, go to the hospital ED closest to you right away.  If you cannot get there right away, or if it is not safe to take yourself, call 911 or your police emergency number.  2. Call your primary care doctor  Tell your doctor about the emergency. Call within 24 hours of going to the ED.  If you cannot call, have someone call for you.  Go to your doctor (not the ED) for any follow-up care.     When it s not an emergency  If a problem is not an emergency, follow these steps:     1. Call your primary care doctor  If you don t know the name of your doctor, call your health plan.  If you cannot call, have someone call for you.  2. Follow instructions  Your doctor will tell you what you should do.  You may be told to see your doctor right away. You may be told to go to the ED. Or you may be told to go to an urgent  care center.  Follow your doctor s advice.       Clinical Emergency Plan    All [facility] clinic patients have access to a Nurse 24 hours a day, 7 days a week.  If you have questions or want advice from a Nurse, please know [facility] is here for you.  You can call your clinic and they will connect you or you can call [facility] at [phone].  [facility] also has Walk In Care clinics in multiple locations.  Call the number listed above for more information about our Walk In Care clinics or visit the [facility] website at [email or web address].

## 2021-06-04 VITALS
BODY MASS INDEX: 22.99 KG/M2 | HEART RATE: 80 BPM | WEIGHT: 121 LBS | DIASTOLIC BLOOD PRESSURE: 68 MMHG | SYSTOLIC BLOOD PRESSURE: 94 MMHG

## 2021-06-04 VITALS
WEIGHT: 121.12 LBS | DIASTOLIC BLOOD PRESSURE: 60 MMHG | BODY MASS INDEX: 23.01 KG/M2 | SYSTOLIC BLOOD PRESSURE: 96 MMHG | HEART RATE: 76 BPM | RESPIRATION RATE: 14 BRPM

## 2021-06-04 NOTE — TELEPHONE ENCOUNTER
I'm not sure she needs it anymore. NO recent notes addressing her problem.  Please schedule follow up appt with PCP to discuss if she needs it.    Health Maintenance Due   Topic Date Due     HPV TEST  1979     HIV SCREENING  02/12/1994     ADVANCE CARE PLANNING  02/12/1997     PNEUMOCOCCAL IMMUNIZATION 19-64 MEDIUM RISK (1 of 1 - PPSV23) 02/12/1998     DEPRESSION FOLLOW UP  04/19/2018

## 2021-06-04 NOTE — TELEPHONE ENCOUNTER
Medication Question or Clarification  Who is calling: [facility]: [facility]  What medication are you calling about?: ranitidine  What dose do you take?: 300 mg  How often are you taking the medication?: daily  Who prescribed the medication?: Soledad  What is your question/concern?: recalled- please send new rx with alternative  Pharmacy: [facility]  Okay to leave a detailed message?: Yes  Site CMT - Please call the pharmacy to obtain any additional needed information.

## 2021-06-04 NOTE — PROGRESS NOTES
Assessment/plan  1. Gastroesophageal reflux disease without esophagitis  - famotidine (PEPCID) 40 MG tablet; Take 1 tablet (40 mg total) by mouth every evening.  Dispense: 30 tablet; Refill: 2  2. Insomnia, unspecified type  3. PTSD (post-traumatic stress disorder)  4. Severe episode of recurrent major depressive disorder, with psychotic features (H)  5. Vitamin D deficiency  - cholecalciferol, vitamin D3, (VITAMIN D3) 2,000 unit Tab; Take 1 tablet (2,000 Units total) by mouth daily.  Dispense: 100 tablet; Refill: 1  6. Vaginal itching  - fluconazole (DIFLUCAN) 150 MG tablet; Take 1 tablet (150 mg total) by mouth once for 1 dose.  Dispense: 2 tablet; Refill: 0  Past medical history, problem list, past surgical history, medications reviewed.   Patient did not bring pill bottles. She thinks she does not have the right medications at home.   Recent change in PPI. Refilled famotidine. Follow up on this in three months.   Mental health appears stable. She declines any medication. Strongly encouraged to keep follow up with mental health provider. Will follow recommendations.   Patient refuses  exam. Refilled fluconazole. Discussed need for physical, pap smear. She will return at her earliest convenience.         Subjective  [age] year old female here with her .   Here for follow up.  Last seen one month ago.   She has history of GERD, PTSD, depression, hepatitis B, insomnia.   She reports ongoing symptoms of stomach burning. She denies diarrhea, vomiting, lack of appetite. Reports regular bowel movements. Has taken PPIs in the past. Was on zantac. Was then changed to pepcid. She does not recall this. She does not know if she is on any antacid.   Her mood is ok. She says she has good days and bad days. She can not sleep well. She has taken medication for this in the past. She says they do not improve her sleep. She does not want to take any new medications. She is working with a therapist. She thinks her  therapy is helping her. She intends to follow up next week.   She notes ongoing vaginal itching. Was prescribed a pill. It went away after this, but now is back. She denies discharge. She denies abnormal vaginal bleeding. She is not aware of her last pap smear. She is unaware of her last physical.       ROS: 12 systems reviewed, all negative except for what is mentioned in HPI.     Past Medical History:   Diagnosis Date     Depression      H. pylori infection      Hepatitis B      Patient Active Problem List   Diagnosis     Insomnia, unspecified type     Anemia     Hepatitis B     Hyperlipidemia     Vitamin D deficiency     Severe episode of recurrent major depressive disorder, with psychotic features (H)     PTSD (post-traumatic stress disorder)     Persistent depressive disorder with anxious distress, currently moderate     Chronic pain of both shoulders     Gastroesophageal reflux disease without esophagitis     Itching     Past Surgical History:   Procedure Laterality Date     LIPOMA RESECTION      left face     Family History   Problem Relation Age of Onset     No Medical Problems Mother      Social History     Socioeconomic History     Marital status: Single     Spouse name: Not on file     Number of children: Not on file     Years of education: Not on file     Highest education level: Not on file   Occupational History     Not on file   Social Needs     Financial resource strain: Not on file     Food insecurity:     Worry: Not on file     Inability: Not on file     Transportation needs:     Medical: Not on file     Non-medical: Not on file   Tobacco Use     Smoking status: Never Smoker     Smokeless tobacco: Current User     Tobacco comment: betle nut   Substance and Sexual Activity     Alcohol use: No     Drug use: No     Sexual activity: Never   Lifestyle     Physical activity:     Days per week: Not on file     Minutes per session: Not on file     Stress: Not on file   Relationships     Social connections:      Talks on phone: Not on file     Gets together: Not on file     Attends [employer] service: Not on file     Active member of club or organization: Not on file     Attends meetings of clubs or organizations: Not on file     Relationship status: Not on file     Intimate partner violence:     Fear of current or ex partner: Not on file     Emotionally abused: Not on file     Physically abused: Not on file     Forced sexual activity: Not on file   Other Topics Concern     Not on file   Social History Narrative     Not on file     Current Outpatient Medications on File Prior to Visit   Medication Sig Dispense Refill     miconazole (MICATIN) 2 % cream Apply to affected area 2 times daily 15 g 1     acetaminophen (TYLENOL EXTRA STRENGTH) 500 MG tablet Take 1 tablet (500 mg total) by mouth every 6 (six) hours as needed for pain. 100 tablet 0     hydrocortisone (ANUSOL-HC) 2.5 % rectal cream Apply topically to affected area BID - no longer than 14 days 30 g 1     No current facility-administered medications on file prior to visit.      Objective  Vitals:    12/18/19 0906   BP: 96/60   Pulse: 76   Resp: 14       General Appearance:  Alert, cooperative, no distress, appears stated age   Head:  Normocephalic, without obvious abnormality, atraumatic   Eyes:  PERRL, conjunctiva/corneas clear, EOM's intact   Ears:  Normal TM's and external ear canals, both ears   Nose: Nares normal, septum midline,mucosa normal, no drainage    Throat: Lips, mucosa, and tongue normal   Neck: Supple   Lungs:   Clear to auscultation bilaterally, respirations unlabored   Heart:  Regular rate and rhythm, S1 and S2 normal, no murmur, rub, or gallop   Abdomen:   Soft, non-tender, bowel sounds active all four quadrants,  no masses, no organomegaly   Extremities: Extremities normal, atraumatic, no cyanosis or edema   Skin: Skin color, texture, turgor normal, no rashes or lesions   Lymph nodes: Cervical, supraclavicular nodes normal   Neurologic: Normal,  CN 2-12 intact
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
## 2021-06-04 NOTE — TELEPHONE ENCOUNTER
Medication Question or Clarification  Who is calling: [facility]  What medication are you calling about? (include dose and sig) Azelastine 0.05 eye drop  Who prescribed the medication?: Johnna Morales  What is your question/concern?: Medication is a non-preferred drug by insurance.  Insurance will cover ketotifen.  Please send new prescription if this medication is appropriate.  [facility]  Okay to leave a detailed message?: No  Site CMT - Please call the pharmacy to obtain any additional needed information.
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
## 2021-06-05 VITALS
SYSTOLIC BLOOD PRESSURE: 94 MMHG | DIASTOLIC BLOOD PRESSURE: 60 MMHG | HEIGHT: 61 IN | HEART RATE: 97 BPM | WEIGHT: 112 LBS | OXYGEN SATURATION: 98 % | BODY MASS INDEX: 21.14 KG/M2 | TEMPERATURE: 98.3 F | RESPIRATION RATE: 16 BRPM

## 2021-06-05 VITALS
RESPIRATION RATE: 16 BRPM | HEART RATE: 74 BPM | DIASTOLIC BLOOD PRESSURE: 56 MMHG | SYSTOLIC BLOOD PRESSURE: 90 MMHG | OXYGEN SATURATION: 99 % | HEIGHT: 61 IN | WEIGHT: 122 LBS | BODY MASS INDEX: 23.03 KG/M2 | TEMPERATURE: 97.9 F

## 2021-06-05 VITALS
BODY MASS INDEX: 23.79 KG/M2 | OXYGEN SATURATION: 98 % | HEIGHT: 61 IN | DIASTOLIC BLOOD PRESSURE: 56 MMHG | HEART RATE: 77 BPM | SYSTOLIC BLOOD PRESSURE: 92 MMHG | WEIGHT: 126 LBS | TEMPERATURE: 97.9 F | RESPIRATION RATE: 16 BRPM

## 2021-06-05 VITALS
BODY MASS INDEX: 21.29 KG/M2 | WEIGHT: 112.75 LBS | HEART RATE: 72 BPM | RESPIRATION RATE: 18 BRPM | DIASTOLIC BLOOD PRESSURE: 58 MMHG | SYSTOLIC BLOOD PRESSURE: 98 MMHG | TEMPERATURE: 98.2 F | HEIGHT: 61 IN

## 2021-06-05 VITALS
TEMPERATURE: 97.8 F | WEIGHT: 115 LBS | HEIGHT: 61 IN | BODY MASS INDEX: 21.71 KG/M2 | DIASTOLIC BLOOD PRESSURE: 64 MMHG | RESPIRATION RATE: 16 BRPM | SYSTOLIC BLOOD PRESSURE: 92 MMHG | HEART RATE: 68 BPM

## 2021-06-05 NOTE — PROGRESS NOTES
Mental Health Visit Note    1/21/2020    Start time: 3:05pm    Stop Time: 3:56pm   Session # 2    Session Type: Patient is presenting for an Individual session.    Silvino Caceres is a 39 y.o. female is being seen today for    Chief Complaint   Patient presents with      Follow Up     coping with heavy heart and difficult memories from the past.        New symptoms or complaints: None    Functional Impairment:   Personal: 4  Family: 3  Work: 4  Social: 4    Clinical assessment of mental status: Reviewed. MSE is current effective 1/21/20  Grooming: Well groomed  Attire: Appropriate  Age: Appears Stated  Behavior Towards Examiner: Cooperative  Motor Activity: Within normal  Eye Contact: Appropriate  Mood: Depressed  Affect: Congruent w/content of speech and Flat.  Speech/Language: Within normal  Attention: Within normal  Concentration: Brief  Thought Process: Within normal  Thought Content: Hallucinations: Within noraml  Delusions: Within normal  Orientation: X 3  Memory: Impairment  Judgement: No Evidence of Impairment  Estimated Intelligence: Below Average  Demonstrated Insight: Diminished  Fund of Knowledge: adequate      Suicidal/Homicidal Ideation present: None Reported This Session      Patient's impression of their current status:   I'm doing ok. Visited a friend over the weekend.   Not noticing any exacerbation of trauma symptoms since engaging in NET. Noticing some improvements in energy and mood.       Therapist impression of patients current state: Patient presented for follow up individual psychotherapy visit. A professional male [place] , Veronique BROWN with [employer], was present for the visit due to the language complexity.  She continues to present with depressed mood and flat affect. Patient and therapist continued NET [facility]- patient actively participated in the placement. She has been more engaged in therapy since doing NET and there is evidence of further development of the therapeutic relationship as  she shares more about her life experiences and works in conjunction with therapist to bring healing to traumatic experiences.     Clinical Global Impressions:  Considering your total clinical experience with this particular patient population, how severe are the patient's symptoms at this time?: 5 - Markedly ill- 1/21/20  Compared to the patient's condition at the START of treatment, this patient's condition is:: 3 - Minimally improved- 1/21/20       Type of psychotherapeutic technique provided: Insight oriented and Client centered, NET    Progress toward short term goals: Good progress in regards to opening up about trauma history and working through the impact of these experiences in therapy.     Review of long term goals: Not done at today's visit Effective 12/10/2019-03/10/2020.    Diagnosis:   1. PTSD (post-traumatic stress disorder)     2. Persistent depressive disorder with anxious distress, currently moderate           Plan and Follow up: Patient is scheduled for follow up psychotherapy on 1/28/2020.   Plan to continue Narrative Exposure therapy.    Discharge Criteria/Planning: Patient will continue with follow-up until therapy can be discontinued without return of signs and symptoms.    Rae Lentz [facility] 1/21/2020

## 2021-06-05 NOTE — PROGRESS NOTES
Mental Health Visit Note    2/4/2020    Start time: 2:05pm    Stop Time: 3:06pm   Session # 3    Session Type: Patient is presenting for an Individual session.    Silvino Caceres is a 39 y.o. female is being seen today for    Chief Complaint   Patient presents with      Follow Up     coping with depressed mood, low energy, and bothersome thoughts from the past.          New symptoms or complaints: None    Functional Impairment:   Personal: 4  Family: 3  Work: 4  Social: 4    Clinical assessment of mental status: Reviewed. MSE is current effective 2/4/20  Grooming: Well groomed  Attire: Appropriate  Age: Appears Stated  Behavior Towards Examiner: Cooperative  Motor Activity: Within normal  Eye Contact: Appropriate  Mood: Depressed  Affect: Congruent w/content of speech.  Speech/Language: Within normal  Attention: Within normal  Concentration: Brief  Thought Process: Within normal  Thought Content: Hallucinations: Within noraml  Delusions: Within normal  Orientation: X 3  Memory: Impairment  Judgement: No Evidence of Impairment  Estimated Intelligence: Below Average  Demonstrated Insight: Diminished  Fund of Knowledge: adequate      Suicidal/Homicidal Ideation present: None Reported This Session      Patient's impression of their current status:   Some days are good, some days are bad.  I can't sleep. I want to sleep, but I'm just awake. If I can sleep well, its better for my body.  Met with [employer] worker and went to the [facility] today- feeling tired now.      Therapist impression of patients current state: Patient presented for follow up individual psychotherapy visit. A professional Yare[employer]  was present for the visit due to the language complexity. She presents with depressed mood. She is open and engaged in session. NET Narration day 1. Patient struggled to access some memories. She remained emotionally regulated in session, but almost to the point of seeming emotionally detached from her experience. She has  shown this during other sessions where he emotional reaction doesn't always match what is being discussed- whether there is a smile, smirk or brief laugh. Patient did not dissociate during session. As the narration continued, her memories were able to become integrated.    Extended session duration due to use of Narrative Exposure Therapy that usually requires  minute sessions.     Clinical Global Impressions:  Considering your total clinical experience with this particular patient population, how severe are the patient's symptoms at this time?: 5 - Markedly ill- 2/4/20  Compared to the patient's condition at the START of treatment, this patient's condition is:: 3 - Minimally improved- 2/4/20       Type of psychotherapeutic technique provided: Insight oriented and Client centered, NET    Progress toward short term goals: Good progress in regards to engagement and willingness to process traumatic experiences.      Review of long term goals: Not done at today's visit- Effective 12/10/2019-03/10/2020.    Diagnosis:   1. PTSD (post-traumatic stress disorder)     2. Persistent depressive disorder with anxious distress, currently moderate           Plan and Follow up: Patient is scheduled for follow up psychotherapy on 2/11/20.  Plan to continue Narrative Exposure therapy.    Discharge Criteria/Planning: Patient will continue with follow-up until therapy can be discontinued without return of signs and symptoms.    Rae Lentz [facility] 2/4/2020

## 2021-06-05 NOTE — PROGRESS NOTES
Mental Health Visit Note    1/14/2020    Start time: 2:13pm    Stop Time: 3:02pm   Session # 1    Session Type: Patient is presenting for an Individual session.    Silvino Cacrees is a 39 y.o. female is being seen today for    Chief Complaint   Patient presents with      Follow Up     coping with heavy heart, low motivation, and bothersome memories.        New symptoms or complaints: None    Functional Impairment:   Personal: 4  Family: 3  Work: 4  Social: 4    Clinical assessment of mental status: Reviewed. MSE is current effective 1/14/20  Grooming: Well groomed  Attire: Appropriate  Age: Appears Stated  Behavior Towards Examiner: Cooperative  Motor Activity: Within normal  Eye Contact: Appropriate  Mood: Depressed  Affect: Congruent w/content of speech and Flat.  Speech/Language: Within normal  Attention: Within normal  Concentration: Brief  Thought Process: Within normal  Thought Content: Hallucinations: Within noraml  Delusions: Within normal  Orientation: X 3  Memory: Impairment  Judgement: No Evidence of Impairment  Estimated Intelligence: Below Average  Demonstrated Insight: Diminished  Fund of Knowledge: adequate      Suicidal/Homicidal Ideation present: None Reported This Session      Patient's impression of their current status:   Patient reports continued heavy heart and worries about her health/sickness.   Not sleeping well- nothing seems to help.   Aches and pains in body.       Therapist impression of patients current state: Patient presented for follow up individual psychotherapy visit. A professional male Yareli , Stephanie (#[id number])  with [employer], was present for the visit due to the language complexity.    Patient engaged in NET Lifeline- patient actively participated in the placement. Patient was more engaged during this session than usual. The activity seemed to bring a lot of meaning and allowed for further processing of life experiences in a new way.     Clinical Global  Impressions:  Considering your total clinical experience with this particular patient population, how severe are the patient's symptoms at this time?: 5 - Markedly ill- same 1/14/20  Compared to the patient's condition at the START of treatment, this patient's condition is:: 3 - Minimally improved- same 1/14/20       Type of psychotherapeutic technique provided: Insight oriented and Client centered, NET    Progress toward short term goals: Good progress in regards to willingness to address trauma history in session rather than engaging in avoidance. Open and engaged today.      Review of long term goals: Not done at today's visit Effective 12/10/2019-03/10/2020.    Diagnosis:   1. Persistent depressive disorder with anxious distress, currently moderate     2. PTSD (post-traumatic stress disorder)           Plan and Follow up: Patient is scheduled for follow up psychotherapy on 1/21/2020.   Plan to continue Narrative Exposure therapy.    Discharge Criteria/Planning: Patient will continue with follow-up until therapy can be discontinued without return of signs and symptoms.    Rae Lentz [facility] 1/14/2020

## 2021-06-06 NOTE — PROGRESS NOTES
Mental Health Visit Note    3/10/2020    Start time: 2:03pm    Stop Time: 2:45pm   Session # 5    Session Type: Patient is presenting for an Individual session.    Silvino Caceres is a 40 y.o. female is being seen today for    Chief Complaint   Patient presents with      Follow Up     coping with depressed mood and trauma history         New symptoms or complaints: None    Functional Impairment:   Personal: 4  Family: 3  Work: 4  Social: 4    Clinical assessment of mental status: Reviewed. MSE is current effective 3/10/20  Grooming: Well groomed  Attire: Appropriate  Age: Appears Stated  Behavior Towards Examiner: Cooperative  Motor Activity: Within normal  Eye Contact: Appropriate  Mood: Depressed  Affect: Congruent w/content of speech, Blunted and Flat.  Speech/Language: Within normal  Attention: Within normal  Concentration: Brief  Thought Process: Within normal  Thought Content: Hallucinations: Within noraml  Delusions: Within normal  Orientation: X 3  Memory: Impairment  Judgement: No Evidence of Impairment  Estimated Intelligence: Below Average  Demonstrated Insight: Diminished  Fund of Knowledge: adequate      Suicidal/Homicidal Ideation present: None Reported This Session. History of occasional wish she was dead - Is informed of crisis plan and resources available.     Patient's impression of their current status:   Feeling tired- went to [employer] today- running & swimming. Attend regularly each week.   Sleep- going to bed late. Hard to fall asleep. This problem has existed for more than 10 years. Reports nightmares 1-2x/week about [place] soldiers killing [place] people. Then denies having nightmares about this later in session.   Attending [employer] on Sundays. Strong val community. Helps her cope.   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  Expresses desire to move back to [facility] sometime,  but poor health has kept her here and no money for flight. Mother still in [facility] and other family in [facility]note 3[place] who want her to visit. She is unhappy living in [place] cold weather- only here to care for aunt.     Therapist impression of patients current state: Patient presented for follow up individual psychotherapy visit. A professional Yareli , Kelly FAIR, from [employer] agency was present for the visit due to the language complexity. She is alert, oriented and well groomed. She is chewing betel nut in session- deposits on teeth- poor dental care. Patient is a confusing  at times with conflicting statements within the same session and over time. This is causing challenges with Narrative therapy.  It also makes it hard to connect in session.  She continues to present with depressed mood and flat affect. She often appears unaffected by things around her and is apathetic.       Clinical Global Impressions:  Considering your total clinical experience with this particular patient population, how severe are the patient's symptoms at this time?: 5 - Markedly ill- 3/10/20  Compared to the patient's condition at the START of treatment, this patient's condition is:: 3 - Minimally improved- 3/10/20       Type of psychotherapeutic technique provided: Insight oriented and Client centered, NET    Progress toward short term goals: Good progress in regards to attending [employer] every week and being physically active. Continued poor progress in regards to energy and sleep.      Review of long term goals: Not done at today's visit- Effective 12/10/2019-03/10/2020. Will update next session.    Diagnosis:   1. Persistent depressive disorder with anxious distress, currently moderate     2. PTSD (post-traumatic stress disorder)           Plan and Follow up: Patient is scheduled for follow up psychotherapy on 3/25/2020.  Plan to update measures and treatment plan at next session.   Prior to next session, continue attending [employer]  at least once per week and [employer] weekly.     Discharge Criteria/Planning: Patient will continue with follow-up until therapy can be discontinued without return of signs and symptoms.    Rae Lentz [facility] 3/10/2020

## 2021-06-06 NOTE — PROGRESS NOTES
"  Mental Health Visit Note    2/11/2020    Start time: 2:06pm    Stop Time: 3:04pm   Session # 4    Session Type: Patient is presenting for an Individual session.    Silvino Caceres is a 39 y.o. female is being seen today for    Chief Complaint   Patient presents with      Follow Up     coping with fatigue and problems sleeping         New symptoms or complaints: None    Functional Impairment:   Personal: 4  Family: 3  Work: 4  Social: 4    Clinical assessment of mental status: Reviewed. MSE is current effective 2/11/20  Grooming: Well groomed  Attire: Appropriate  Age: Appears Stated  Behavior Towards Examiner: Cooperative  Motor Activity: Within normal, slowed  Eye Contact: Appropriate  Mood: Depressed  Affect: Congruent w/content of speech, Blunted and Flat.  Speech/Language: Within normal  Attention: Within normal  Concentration: Brief  Thought Process: Within normal  Thought Content: Hallucinations: Within noraml  Delusions: Within normal  Orientation: X 3  Memory: Impairment  Judgement: No Evidence of Impairment  Estimated Intelligence: Below Average  Demonstrated Insight: Diminished  Fund of Knowledge: adequate      Suicidal/Homicidal Ideation present: Yes: health changes since november 2003 \"makes me feel better off not living. \" Denies any active plans, means or intent.   Reviewed crisis plan to call 911, go to nearest ER, or contact other supports/services. Provided printout of contact information for [facility] crisis, suicide hotline, and  urgent care.        Patient's impression of their current status:   Tired all the time. No improvements in sleep. Unable to fall asleep until 1/2am. Identifies feeling frustrated about this. Denies racing thoughts/worries.   Exercising at the [facility].   Recalls she slept well from early childhood until she was 22. Everything changed when taking the wrong medication at age 23 and feeling very sick. She believes poor sleep and health conditions are related to that incident. She " "doesn't know what the medication was, but was told it was to help her sleep. She denies having a fear of sleeping or that happening again.     Therapist impression of patients current state: Patient presented for follow up individual psychotherapy visit. A professional Yareli , Kelly FAIR, from [employer] agency was present for the visit due to the language complexity. Patient appears very apathetic about things. There is a sense of detachment that she has from her experiences and her emotions. This was evident more as she shares more about her trauma history and leaving her country and the loss of loved ones. She is showing signs of increased hopelessness as she isn't seeing improvements in her energy levels and is constantly fatigued. Review of most recent labs only show Vitamin D deficiency and she is taking a supplement for it. Thyroid was normal. Her extreme fatigue and though process of \"feelin better off not living\" indicates it is likely depression related although she denies feeling \"sad\" and still having occasional moments of happiness. She was encouraged by provider to continue to utilize her val for coping as well as attending the [facility] for activity.  A lot of content around her cultural beliefs and traditional healing practices that were done by her grandparents came up today in session- it was hard for the patient  to explain and understand and the . Therapist would like to gather more information about these cultural practices to improve understanding and provide culturally competent interventions.   Extended session duration due to use of Narrative Exposure Therapy that usually requires  minute sessions.     Clinical Global Impressions:  Considering your total clinical experience with this particular patient population, how severe are the patient's symptoms at this time?: 5 - Markedly ill- 2/11/20  Compared to the patient's condition at the START of treatment, this patient's " condition is:: 3 - Minimally improved- 2/11/20       Type of psychotherapeutic technique provided: Insight oriented and Client centered, NET    Progress toward short term goals: Poor progress in regards to inability to sleep and fatigue.     Review of long term goals: Not done at today's visit- Effective 12/10/2019-03/10/2020.    Diagnosis:   1. Persistent depressive disorder with anxious distress, currently moderate           Plan and Follow up: Patient is scheduled for follow up psychotherapy on 3/10/2020.  Plan to continue Narrative Exposure therapy and address cultural beliefs/healing practices.     Discharge Criteria/Planning: Patient will continue with follow-up until therapy can be discontinued without return of signs and symptoms.    Rae Lentz [facility] 2/11/2020

## 2021-06-07 NOTE — TELEPHONE ENCOUNTER
----- Message from Maggie Rowe MD sent at 4/22/2020 12:20 PM CDT -----  Regarding: FW: Requesting medication/appointment  Pill and cream sent for yeast infection. Follow up if no better.   ----- Message -----  From: Rae Lentz LICSW  Sent: 4/16/2020   2:51 PM CDT  To: Maggie Rowe MD, Maggie Rowe Care Team [place]  Subject: Requesting medication/appointment                Patient is requesting a refill on the following medication: fluconazole (DIFLUCAN) 150 MG tablet; .    She was last seen by Dr. Rowe in December for this. View note below. Was encouraged to have follow up in 3 months.   Patient reports her appointment yesterday was cancelled and told to be pushed out until July. Does Dr. Rowe want to do a virtual visit with patient?      Note from December:  Vaginal itching  - fluconazole (DIFLUCAN) 150 MG tablet; Take 1 tablet (150 mg total) by mouth once for 1 dose.  Dispense: 2 tablet; Refill: 0  Past medical history, problem list, past surgical history, medications reviewed.   Patient did not bring pill bottles. She thinks she does not have the right medications at home.   Recent change in PPI. Refilled famotidine. Follow up on this in three months.     -Rae

## 2021-06-07 NOTE — TELEPHONE ENCOUNTER
Called and spoke with Silvino Collado Say , Message was given, [place] Say  understood, no further questions.  Use [facility] to call patient.

## 2021-06-07 NOTE — PROGRESS NOTES
"  Mental Health Tele Visit Note    Patient: Silvino Collado Say    : 1980 MRN: [id number]    Date: 2020   Start time: 2:46pm   Stop Time: 3:09pm   Session # 7    The patient has been notified of the following:   \"We have found that certain health care needs can be provided without the need for a face to face visit.  This service lets us provide the care you need with a phone conversation.  I will have full access to your [place] medical record during this entire phone call.   I will be taking notes for your medical record. Since this is like an office visit, we will bill your insurance company for this service.  There are potential benefits and risks of telephone visits (e.g. limits to patient confidentiality) that differ from in-person visits.?  Confidentiality still applies for telephone services, and nobody will record the visit.  It is important to be in a quiet, private space that is free of distractions (including cell phone or other devices) during the visit.?? If during the course of the call I believe a telephone visit is not appropriate, you will not be charged for this service.\" Patient provided verbal consent for service.  Session Type: Patient is participating in a telemedicine phone visit [phone] via [employer] , [place].   Chief Complaint   Patient presents with      Follow Up     coping with depressed mood.       New symptoms or complaints: None reported       Functional Impairment:   Personal: 3  Family: 3  Work: 4  Social:4           ASSESSMENT: Current Emotional / Mental Status (status of significant symptoms):              Risk status (Self / Other harm or suicidal ideation)              Reviewed. MSE and safety assessment are current effective 20   Patient denies personal safety concerns.              Patient denies current or recent suicidal ideation or behaviors.              Patient denies current or recent homicidal ideation or behaviors.              Patient " "denies current or recent self injurious behavior or ideation.              Patient denies other safety concerns.              Patient has changes in risk factors due to increased isolation because of the coronavirus pandemic.              Patient denies changes in protective factors.              Recommended that patient call 911 or go to the local ED should there be a change in any of these risk factors.                 Attitude:                                   Cooperative               Orientation:                             x4              Speech                          Rate / Production:       Normal                           Volume:                       Normal               Mood:                                      Depressed               Thought Content:                    Clear               Thought Form:                        Coherent  Logical               Insight:                                     Good        Patient's impression of their current status:   Continues to endorse depressed mood. Not engaging in many activities, especially due to coronavirus pandemic. Doing some tasks around the house. Doesn't feel able to do much because of \"sickness.\" Continued inability to sleep.    Therapist impression of patients current state: Patient presented for follow up individual psychotherapy visit via phone due to coronavirus pandemic. Patient was alert and oriented. She sounds flat in session. Patient presents with symptoms of depressed mood.  It appears that patient her depression impacts how her body feels. She continues to work on understanding how her depression and trauma symptoms present in her body and impact her functioning. She still struggles to understand the concept of mental health at times. When she refers to her \"sickness\" it is her mental health.   We continue to work on gaining insight and awareness as well as implementing coping skills outside of session. She struggles with repeated " efforts to cope as she often feels things don't work after trying them once or twice and then stops implementing them.     Type of psychotherapeutic technique provided: Insight oriented and Client centered, psychoeducation    Progress toward short term goals: Poor progress, not engaging in much physical activity or walks- mostly isolating at home. Low energy an dmotivation at this time.      Review of long term goals: Not done at today's visit. We haven't completed the document as patient hasn't been able to meet in person with provider to review and sign due to pandemic. At this time, she continues to work on depression and trauma goals- the will remain the same. We will need to update at next session.       Diagnosis:  1. PTSD (post-traumatic stress disorder)    2. Persistent depressive disorder with anxious distress, currently moderate          Plan and Follow up: Patient is to follow up with provider in 2-4 weeks for psychotherapy. Plan to update treatment plan.   Patient to continue to work on sleep hygiene skills and engage in physical activity 3-4 times per week.      Discharge Criteria/Planning: Client has chronic symptoms and ongoing therapy for maintenance stability recommended.           I have reviewed the note as documented above.  This accurately captures the substance of my conversation with the patient.  As the provider I attest to compliance with applicable laws and regulations related to telemedicine.  Rae Lentz, LICSW 4/16/2020

## 2021-06-07 NOTE — PROGRESS NOTES
Utilized [employer] , [place] #[id number] to complete phone outreach to patient. Notified patient that psychotherapy follow up appointment will be changed to telephone visit with provider due to public health concerns with COVID-19. Patient expressed understanding and agreed to plan. Will call patient at regularly scheduled appointment time for teletherapy. Phone call outreach to cancel assigned in-person  as well as any medical transportation was also completed.   BIMAL Arevalo

## 2021-06-08 NOTE — PROGRESS NOTES
"  Subjective:    Silvino Caceres is a 36 y.o. female who presents for evaluation of medication refills.  She is taking mirtazapine 30 mg daily at bedtime, prazosin 1 mg daily at bedtime, and Benadryl.  She was taking these for a few months, then seemed to develop allergy symptoms.  She said after taking them, she started to get itching skin, slightly swollen lips.  Symptoms were mild at first, then seemed to get worse over time.  She eventually stopped all 3 medicines and symptoms resolved.  She's been off of all the medicines for at least a month.  She took all 3 medicines at the same time, so it not sure which one could have caused the symptoms.    She moved here from [place] in 2016.  No children.  She is not .  She lives with her cousin's family.  She says she had some type of a skin surgery and the side of her face, possibly a cyst removal.  She is here for \"hypertension\" today.  She is unsure if she was told in the past that she had hypertension.  She has a normal blood pressure today.  I suspect she may have been taking the prazosin for sleep/mood aid rather than hypertension.  She says she does sometimes have trouble sleeping.  Sometimes she does have depression or anxiety.  Old records from previous clinic were requested today.  Father is .  She denies any other significant medical history, no other surgical history.  No other hospitalizations.    Patient Active Problem List   Diagnosis     Trouble in sleeping       Current Outpatient Prescriptions:      mirtazapine (REMERON SOL-TAB) 30 MG disintegrating tablet, Take 1 tablet (30 mg total) by mouth bedtime., Disp: 30 tablet, Rfl: 2     Objective:   Allergies:  Review of patient's allergies indicates no known allergies.    Vitals:  Vitals:    17 0835   BP: 112/72   Pulse: 75   SpO2: 98%     There is no height or weight on file to calculate BMI.    Vital signs reviewed.  General: Patient is alert and oriented x 3, in no apparent " distress, appropriately groomed with normal affect  Cardiac: regular rate and rhythm, no murmurs  Pulmonary: lungs clear to auscultation bilaterally, no crackles, rales, rhonchi, or wheezing noted    Assessment and Plan:   1.  Trouble sleeping.  I suspect that she may have depression and also insomnia, based on our discussion today.  She was having itching and lip swelling after taking her 3 medicines before bedtime.  Symptoms resolved once she stopped all the medicines.  I restarted mirtazapine daily at bedtime.  She will start this and monitor for symptoms.  Of course, if similar symptoms start again, she'll stop the medicine and notify the clinic.  I will be get her records and review them in the meantime.  Patient denies any thoughts of wanting to hurt him/herself or others and does contract for safety.  Plan on having her return in one month to establish care with PCP for physical.  She declines offer of birth control today, saying she does not need it because she is not .  I asked her to let us know if she changes her mind.    This dictation uses voice recognition software, which may contain typographical errors.

## 2021-06-08 NOTE — PROGRESS NOTES
Reviewed old records, scanned in to chart.  EHR updated.  Most recent medication list, as of 10/27/16:  -prazosin 1 mg qhs  -remeron 30 mg qhs  -benadryl prn bedtime

## 2021-06-08 NOTE — PROGRESS NOTES
"Silvino Caceres is a 40 y.o. female who is being evaluated via a billable telephone visit.      The patient has been notified of following:     \"This telephone visit will be conducted via a call between you and your physician/provider. We have found that certain health care needs can be provided without the need for a physical exam.  This service lets us provide the care you need with a short phone conversation.  If a prescription is necessary we can send it directly to your pharmacy.  If lab work is needed we can place an order for that and you can then stop by our lab to have the test done at a later time.    Telephone visits are billed at different rates depending on your insurance coverage. During this emergency period, for some insurers they may be billed the same as an in-person visit.  Please reach out to your insurance provider with any questions.    If during the course of the call the physician/provider feels a telephone visit is not appropriate, you will not be charged for this service.\"    Patient has given verbal consent to a Telephone visit? Yes    What phone number would you like to be contacted at? [phone]    Patient would like to receive their AVS by AVS Preference: Mail a copy.    Additional provider notes:   Subjective  [age] old female calls with concerns of medication refill.   Telephone visit completed due to current pandemic of covid 19.  She needs a refill for the medication she used for vaginal itching. This seems to be an ongoing problem. She says it usually occurs before her menstrual cycle. No abnormal discharge. No abnormal vaginal bleeding. Reports no concern of pregnancy. She is due for her pap smear. She does not recall the last one. Is not concerned about sexually transmitted infection.   She also wants a refill for tylenol. She uses this for pain in her low back, shoulders. She denies recent fall or injury. No joint swelling. No rash.  She denies concern for covid 19. "       ROS: 12 systems reviewed, all negative except for what is mentioned in HPI.     Past Medical History:   Diagnosis Date     Depression      H. pylori infection      Hepatitis B      Patient Active Problem List   Diagnosis     Insomnia, unspecified type     Anemia     Hepatitis B     Hyperlipidemia     Vitamin D deficiency     Severe episode of recurrent major depressive disorder, with psychotic features (H)     PTSD (post-traumatic stress disorder)     Persistent depressive disorder with anxious distress, currently moderate     Chronic pain of both shoulders     Gastroesophageal reflux disease without esophagitis     Itching     Past Surgical History:   Procedure Laterality Date     LIPOMA RESECTION      left face     Family History   Problem Relation Age of Onset     No Medical Problems Mother      Social History     Socioeconomic History     Marital status: Single     Spouse name: Not on file     Number of children: Not on file     Years of education: Not on file     Highest education level: Not on file   Occupational History     Not on file   Social Needs     Financial resource strain: Not on file     Food insecurity     Worry: Not on file     Inability: Not on file     Transportation needs     Medical: Not on file     Non-medical: Not on file   Tobacco Use     Smoking status: Never Smoker     Smokeless tobacco: Current User     Tobacco comment: betle nut   Substance and Sexual Activity     Alcohol use: No     Drug use: No     Sexual activity: Never   Lifestyle     Physical activity     Days per week: Not on file     Minutes per session: Not on file     Stress: Not on file   Relationships     Social connections     Talks on phone: Not on file     Gets together: Not on file     Attends Synagogue service: Not on file     Active member of club or organization: Not on file     Attends meetings of clubs or organizations: Not on file     Relationship status: Not on file     Intimate partner violence     Fear of  current or ex partner: Not on file     Emotionally abused: Not on file     Physically abused: Not on file     Forced sexual activity: Not on file   Other Topics Concern     Not on file   Social History Narrative     Not on file     Current Outpatient Medications on File Prior to Visit   Medication Sig Dispense Refill     cholecalciferol, vitamin D3, (VITAMIN D3) 2,000 unit Tab Take 1 tablet (2,000 Units total) by mouth daily. 100 tablet 1     famotidine (PEPCID) 40 MG tablet Take 1 tablet (40 mg total) by mouth every evening. 30 tablet 2     hydrocortisone (ANUSOL-HC) 2.5 % rectal cream Apply topically to affected area BID - no longer than 14 days 30 g 1     ketotifen (ZADITOR/ZYRTEC ITCHY EYES) 0.025 % (0.035 %) ophthalmic solution 1 drop in each eye 1-2 times a day, as needed. 10 mL 3     miconazole (MICATIN) 2 % cream Apply to affected area 2 times daily 15 g 1     No current facility-administered medications on file prior to visit.          Assessment/Plan:  1. Vaginal itching  - fluconazole (DIFLUCAN) 150 MG tablet; Take 1 tablet (150 mg total) by mouth once for 1 dose.  Dispense: 2 tablet; Refill: 0  2. Arthralgia, unspecified joint  - acetaminophen (TYLENOL EXTRA STRENGTH) 500 MG tablet; Take 1 tablet (500 mg total) by mouth every 6 (six) hours as needed for pain.  Dispense: 100 tablet; Refill: 0  Discussed limitations of telephone and virtual visits.   EHR reviewed.   Treated presumptively for yeast vaginitis.   Advised on be seen for her annual visit, needs pap smear. Check on her symptoms at that time.   Refilled acetaminophen. Discussed appropriate use. Cautioned against overuse.   Will schedule her next physical today.       Phone call duration:  10 minutes    Maggie Rowe MD

## 2021-06-08 NOTE — PROGRESS NOTES
"  Mental Health Tele Visit Note    Patient: Silvino Collado Say    : 1980 MRN: [id number]    Date: 3/25/2020   Start time: 2:16pm   Stop Time: 2:37pm   Session # 6    \"The patient has been notified of the following:   \"We have found that certain health care needs can be provided without the need for a face to face visit.  This service lets us provide the care you need with a phone conversation.  I will have full access to your [facility] medical record during this entire phone call.   I will be taking notes for your medical record. Since this is like an office visit, we will bill your insurance company for this service.  There are potential benefits and risks of telephone visits (e.g. limits to patient confidentiality) that differ from in-person visits.?  Confidentiality still applies for telephone services, and nobody will record the visit.  It is important to be in a quiet, private space that is free of distractions (including cell phone or other devices) during the visit.?? If during the course of the call I believe a telephone visit is not appropriate, you will not be charged for this service\"  Consent has been obtained for this service by care team member: Yes  Session Type: Patient is participating in a telemedicine phone visit due to coronavirus pandemic. A Yareli  from [employer] was present due to communication barrier.  Chief Complaint   Patient presents with      Follow Up     coping with depressed mood and fatigue       New symptoms or complaints: None reported    Functional Impairment:   Personal: 3  Family: 3  Work: 4  Social:4          ASSESSMENT: Current Emotional / Mental Status (status of significant symptoms):              Risk status (Self / Other harm or suicidal ideation)              Patient denies personal safety concerns.              Patient denies current or recent suicidal ideation or behaviors.              Patient denies current or recent homicidal ideation or " behaviors.              Patient denies current or recent self injurious behavior or ideation.              Patient denies other safety concerns.              Patient has changes in risk factors due to increased isolation because of the coronavirus pandemic.              Patient denies changes in protective factors.              Recommended that patient call 911 or go to the local ED should there be a change in any of these risk factors.                Attitude:                                   Cooperative               Orientation:                             x4              Speech                          Rate / Production:       Normal                           Volume:                       Normal               Mood:                                      Anxious  Depressed               Thought Content:                    Clear               Thought Form:                        Coherent  Logical               Insight:                                     Good       Patient's impression of their current status:   Worries about the coronavirus pandemic.   No longer able to go to the [facility] with [employer]. They only connect on the phone.   Continues to report inability to sleep and occasional nightmares.  Heavy heart. Discomfort in body.     Therapist impression of patients current state: Patient presented for follow up individual psychotherapy visit via phone due to coronavirus pandemic. Patient was alert, oriented and engaged.  Patient presents with continued depressed mood and trauma symptoms. Many of her symptoms present somatically as well. She has a slight increase in anxiety due to coronavirus pandemic which is to be expected. Unfortunately she cannot access her gym or emet with [employer] with the pandemic. These things were helping her with managing depression and she was starting to establish a good routine.      Type of psychotherapeutic technique provided: Insight oriented and Client centered        Progress toward short term goals: Progress as expected, : opnely discussing concerns and coping strategies during tele-sessions. Patient implementing skills learned in therapy between sessions    Review of long term goals: Not done at today's visit. Treatment Plan due to be updated.        Diagnosis:  1. Persistent depressive disorder with anxious distress, currently moderate    2. PTSD (post-traumatic stress disorder)    3. Insomnia, unspecified type          Plan and Follow up: Patient is scheduled for follow up psychotherapy on 4/16/2020.  Prior to next session, engage in walks outside or other movement at home to continue to manage depression and improve energy levels since no longer able to attend the gym.      Discharge Criteria/Planning: Patient will continue with follow-up until therapy can be discontinued without return of signs and symptoms.              I have reviewed the note as documented above.  This accurately captures the substance of my conversation with the patient.  As the provider I attest to compliance with applicable laws and regulations related to telemedicine.  Rae Lentz, LICSW 3/25/2020

## 2021-06-09 NOTE — PROGRESS NOTES
Assessment:   1. Routine general medical examination at a health care facility  2. Hepatitis B  3. Severe major depression with psychotic features  Pap smear up to date.   Declines STD screening.   Unclear history of hepatitis b, some work up done before.   All questions answered.  Breast self exam technique reviewed and patient encouraged to perform self-exam monthly.  Discussed healthy lifestyle modifications.   Discussed mental health. Agrees to establish care with a mental health provider. Referral made. Will follow recommendations.  Encouraged to keep up with routine health maintenance.   - Thyroid Stimulating Hormone (TSH)  - Vitamin D, Total (25-Hydroxy)  - Lipid Cascade  - Comprehensive Metabolic Panel  - HM2(CBC w/o Differential)  - Hepatitis Acute Evaluation  - Hepatitis B Virus (HBV) DNA, Detection and Quantification by RT-PCR  - Hepatitis Be Antibody (Anti-HBe)  - AFP-Tumor Marker  - Ambulatory referral to Psychology    Subjective:      Silvino Caceres is a 37 y.o. female who presents for an annual exam. The patient is not sexually active. The patient participates in regular exercise: no. The patient reports that there is not domestic violence in her life.   Notes history of depression, though EHR reviewed. Past history, past surgical, family history reviewed.   She denies current suicidal or homicidal ideation.   She is not seeking therapy or counseling. Is taking mirtazapine as directed.   Is not sure if she has seen a therapist in the past. Agrees to establish care.   Is not sure about her history of hepatitis B.   Her parents live in [facility].   Lives with her aunt.   Moved her from [place], lived in [place] also.   Is single. Not in a relationship. Defers STD screening. Declines birth control.   Unemployed.   Nonsmoker. Denies use of alcohol or illicit drugs.     Healthy Habits:   Regular Exercise: No  Sunscreen Use: No  Healthy Diet: Yes  Dental Visits Regularly: Yes  Seat Belt: Yes  Sexually active:  No  Self Breast Exam Monthly:No  Hemoccults: N/A  Flex Sig: N/A  Colonoscopy: N/A  Lipid Profile: N/A  Glucose Screen: N/A  Prevention of Osteoporosis: N/A  Last Dexa: No  Guns at Home:  No      Immunization History   Administered Date(s) Administered     Hepatitis A: Immune 04/24/2015     Influenza, inj, historic 11/01/2016     Tdap 02/13/2014     Immunization status: stated as current, but no records available.    No exam data present    Gynecologic History  Patient's last menstrual period was 02/05/2017 (exact date).  Contraception: none  Last Pap: none.       OB History   No data available       Current Outpatient Prescriptions   Medication Sig Dispense Refill     mirtazapine (REMERON SOL-TAB) 30 MG disintegrating tablet Take 1 tablet (30 mg total) by mouth bedtime. 30 tablet 2     No current facility-administered medications for this visit.      Past Medical History:   Diagnosis Date     Depression      H. pylori infection      Hepatitis B      Past Surgical History:   Procedure Laterality Date     LIPOMA RESECTION      left face     Review of patient's allergies indicates no known allergies.  Family History   Problem Relation Age of Onset     No Medical Problems Mother      Social History     Social History     Marital status: Single     Spouse name: N/A     Number of children: N/A     Years of education: N/A     Occupational History     Not on file.     Social History Main Topics     Smoking status: Never Smoker     Smokeless tobacco: Not on file     Alcohol use No     Drug use: No     Sexual activity: No     Other Topics Concern     Not on file     Social History Narrative       Review of Systems  General:  Denies problem  Eyes: Denies problem  Ears/Nose/Throat: Denies problem  Cardiovascular: Denies problem  Respiratory:  Denies problem  Gastrointestinal:  Denies problem, Genitourinary: Denies problem  Musculoskeletal:  Denies problem  Skin: Denies problem  Neurologic: Denies problem  Psychiatric: Denies  "problem  Endocrine: Denies problem  Heme/Lymphatic: Denies problem   Allergic/Immunologic: Denies problem        Objective:         Vitals:    02/22/17 0857   BP: 124/80   Pulse: 64   Resp: 16   Temp: 97.8  F (36.6  C)   TempSrc: Oral   Weight: 127 lb (57.6 kg)   Height: 5' 1\" (1.549 m)     Body mass index is 24 kg/(m^2).    Physical Exam:  General Appearance: Alert, cooperative, no distress, appears stated age  Head: Normocephalic, without obvious abnormality, atraumatic  Eyes: PERRL, conjunctiva/corneas clear, EOM's intact  Ears: Normal TM's and external ear canals, both ears  Nose: Nares normal, septum midline,mucosa normal, no drainage  Throat: Lips, mucosa, and tongue normal; teeth and gums normal  Neck: Supple, symmetrical, trachea midline, no adenopathy;  thyroid: not enlarged, symmetric, no tenderness/mass/nodules; no carotid bruit or JVD  Back: Symmetric, no curvature, ROM normal, no CVA tenderness  Lungs: Clear to auscultation bilaterally, respirations unlabored  Breasts: No breast masses, tenderness, asymmetry, or nipple discharge.  Heart: Regular rate and rhythm, S1 and S2 normal, no murmur, rub, or gallop  Abdomen: Soft, non-tender, bowel sounds active all four quadrants,  no masses, no organomegaly  Pelvic:defers  Extremities: Extremities normal, atraumatic, no cyanosis or edema  Skin: Skin color, texture, turgor normal, no rashes or lesions  Lymph nodes: Cervical, supraclavicular, and axillary nodes normal  Neurologic: Normal        "

## 2021-06-09 NOTE — PROGRESS NOTES
"Mental Health Visit Note    This is a dual signature report. My supervising clinician is BIMAL Bolivar.    3/30/2017    Start time: 9:05am    Stop Time: 9:50am   Session # 1    Silvino Caceres is a 37 y.o. female is being seen today for    Chief Complaint   Patient presents with     MH Follow Up     Psychotherapy follow up visit to establish care and begin addressing symptoms of depression     New symptoms or complaints: Patient reported the following physical complaints: Dizziness, headaches, body tingling. The patient also reported feeling down, heavy and tired.    Functional Impairment:   Personal: 3  Family: 3  Work: 3  Social:4    Clinical assessment of mental status:   Grooming: Disheveled  Attire: Appropriate  Age: Appears Stated  Behavior Towards Examiner: Guarded/Evasive  Motor Activity: Within normal   Eye Contact: Appropriate  Mood: Euthymic  Affect: Congruent w/content of speech and Flat  Speech/Language: Delayed/Hesitant and Soft  Attention: Distractible  Concentration: Brief  Thought Process: Slow  Thought Content: Hallucinations: Within noraml  Delusions: Within normal  Orientation: X 3  Memory: No Evidence of Impairment  Judgement: No Evidence of Impairment  Estimated Intelligence: Below Average  Demonstrated Insight: Diminished  Fund of Knowledge: inadequate    Suicidal/Homicidal Ideation present: None Reported This Session    Patient's impression of their current status:   The patient presented for her first follow-up psychotherapy session after completing a diagnostic assessment with this provider on 3/16/2017. The patient was accompanied by an . She reported feeling dizzy today in addition to experiencing a cold/chilled sensation in her fingers and feet. The patient was asked about her mood and she reported feeling down, tired and weak. The patient reported that she \"does not want to meet friends\" and described being uninterested in being more social. The patient denied feeling " "worried or unmotivated and simply reported that she \"just didn't want to\" in reference to being social or meeting friends. 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 She shared being interested in working more full-time but reported that current physical problems prevent her from gaining full-time employment. She described the physical/health problems as her dizziness and weakness in body. The patient had difficulty identifying any activities she enjoyed and stated, \"if my mood is good then I enjoy cooking; if my mood is bad then I just want to rest.\" The patient shared that when she feels dizzy she just tries to rest and avoid much activity.     Therapist impression of patients current state:   The patient appeared evasive and guarded during her second session today. However, the patient was smiling but often covered her mouth as if embarrassed. The patient required extensive prompting from the therapist in order to engage in a dialogue about her thoughts and feelings. The therapist attempted to join with the patient and build a therapeutic alliance. However, the patient remained guarded and refrained from speaking without being asked questions. The therapist attempted to allow more time for the patient to process and give the patient more space/time to share if she desired. At times, the patient appeared to lose concentration and without guidance or direction often appeared lost. Thus, when given moments of silence or opportunities to process, she appeared to have difficulty focusing or maintaining relevant thought patterns. In this way, the patient appeared to benefit from guidance from the therapist. When the therapist attempted to explore the patient's reported symptoms, the patient had difficulty identifying any mental health symptoms and instead focused on reported 'medical problems' such as dizziness and weakness. The therapist intends to continue providing psycho-education " about symptoms of depression in future sessions and helping the patient gain more self-awareness.    Type of psychotherapeutic technique provided: Client centered    Progress toward short term goals:Patient appeared guarded today but made some progress in building a therapeutic alliance. Patient demonstrated poor insight regarding presenting mental health symptoms    Review of long term goals: Not done at today's visit    Diagnosis:   1. Depression, unspecified depression type        Plan and Follow up: Patient will return on 4/27/2017. Frequency of visits will be determine in future sessions and may be impacted by therapist availability.       Discharge Criteria/Planning: Client has chronic symptoms and ongoing therapy for maintenance stability recommended.    Mary Angeles 3/30/2017    Performed and documented by ENOCH Zelaya    I have read, discussed and reviewed the documentation as presented by ENOCH Zelaya LICSW

## 2021-06-09 NOTE — PROGRESS NOTES
"Diagnostic Assessment    This is a dual signature report. My supervising clinician is BIMAL Bolivar.    [] Brief  ?[x] Standard    Date(s): 3/16/2017  Start Time: 9:05am  Stop Time: 10:00am    Patient Name: Silvino Say  Age: 37 y.o.       1980    Referral Source: Dr. Rowe  Therapist: ENOCH Zelaya                                                                                    Persons Present: Patient,  and Therapist    Chief Complaint (in the patients words; reason patient believes they have been referred):  Patient reportedly believes that she was referred due to \"emotional problems.\" She identified some physical problems which cause her to feel unhappy.     Patient s expectation for treatment (patient stated initial goal; i.e.: I want to let go of my worries , Medication treatment if indicated):  Patient was initially unable to indicate any expectations for treatment. With extensive prompting, the patient stated, \"Maybe talk about issues in the future if the time is right?\"    Sources/references used in completing this assessment: (face-to-face interview, Patient chart, adult intake questionnaire, etc.)  Face-to-face interview    Presenting Problem/History:    Functional impairments:   Personal: 4  Family: 3  Work: 2  Social: 4     How does the presenting problem affect patients daily functioning:  The patient reported that presenting problems do not severely impair or affect her daily functioning. However, the patient appeared to lack insight regarding the affects of presenting problems.    Issues/Stressors:   The patient lacked insight into current issues or stressors. However, she alluded to being isolated in [place] and reported a complex trauma history.     Please select all that apply:    Physical Problems: Dizzines, Flushes or chills, Sweating, Chest pain, Rapid heart pounding, Headaches, Skin rash, Shortness of breath, Decreased energy and muscle tightness and " "tension in body    Social Problems: none reported    Behavioral Problems: none reported    Cognitive Problems: Distractability, Poor attention, Poor memory, Forgetful, Recurrent bad memories and Worries    Emotional Problems: Anxious, Nervous, Restricted emotion and Depressed mood     Onset/Frequency/Duration presenting problem symptoms:  Patient reported that she suffered [place] which she identified as a \"physical problem.\" She reported that \"physical problems and depression\" began impacting her more personally approximately 3 years ago. Furthermore, she reported that presenting symptoms began back [place] but worsened after arriving in the [place]     How does the patient perceive her problem in relation to how others see her problem?  Despite extensive prompting, patient was unable to describe how she perceives her problem in relation to how others see her problem.     Family/Social History:    Marriages/Significant other (including patients evaluation of the relationship quality):  Patient is single and has never been .    Children (sex and ages, any significant issues):  Patient does not have any children.    Parents (ages, living or , how many years ):  The patient's father is  but her mother is still living in [facility].    Siblings (birth order, ages, significant issues):  Patient has 2 younger brothers who also still live in [facility].     Climate in family of origin (how does the patient perceive their childhood experience):  The patient [place] war[place]torn [place] and reporting having bad memories of the [employer]. The patient was guarded and provided limited information about her childhood experience.    Education (type and level of education):  The patient completed the equivalent of 6th grade.    Problems with Learning or School (developmental issues, learning disabilities, behavioral concerns in school):  No identified problems with " learning or school reported at intake.    Developmental factors (developmental milestones, head injuries, CVA s, etc. that may have impeded milestones):  No developmental factors reported at intake.    Significant personal relationships including patient s evaluation of the relationship quality (Co-worker s, neighbor s, AA groups, [employer] peers, etc.):   The patient reported having significant personal relationships with her family members but reported that they still live in [facility]. She reported that she has several friends in [place] who she grew up with and has some friends at [employer].    Significant life events (what does the patient identify as a personal life changing/influencing event):  Significant life events gathered from the patient's narrative include: leaving [facility], living in refugee camp and moving to [place].    Sexual/physical/emotional/financial abuse/traumatic event. (any child protection involvement; who reported, Impact on patient/family/other):   The patient alluded to a complex trauma history, as she spent most of her life in [facility] amid war and violence. She was guarded during the intake and refrained from sharing much about her past. However, the patient did share that she previously lived in [place] after arriving in [facility] and experienced many nightmares while living there. She described her environment in [place] as dangerous and unsafe and shared that flashbacks and nightmares occurred frequently but have not occurred as much while living in [place].  PTSD Symptoms (See addendum for PTSD Criteria):  Yes, including nightmares. The patient was guarded in providing any additional information and simply alluded to a complex trauma history that will require additional screening.        Contextual Non-personal factors contributing to the patients concerns (divorce in family, nation/natural disasters):  Contextual non-personal factors contributing to concerns include war in country of  origin.    Strengths/personal resources (what does the patient do well, what is going well in life, positive personality characteristics):  The patient appeared resourceful and resilient.    Weaknesses (what does patient identify as a weakness):  Patient appeared to be a poor historian, lacking insight regarding the impact of underlying emotional issues. Despite extensive prompting, the patient had difficulty engaging in the therapeutic process but displayed some willingness to gain trust in the therapist.     Support network(s)/Resources (including strength and quality of social networks, who does the client consider supportive, other agencies or services patient uses):   Patient reported being involved with [facility] and seeks support when needed.     Belief system:    The patient identified as Bahai and reported attending the [employer].     Cultural influences and impact on patient (ask about all aspects of culture and ask which are relevant to the patient. Go beyond nationality and ethnicity. Consider biases, life style, community style, i.e.: urban, poverty, abuse, etc). see page 5 Diagnostic Assessment, Clinical Training for descriptors):  The patient is a 37 year old [place] female who was born in [place]. She grew up in [place] and eventually lived in a refugee camp which she left at age 25. She moved to [place] in 2011 but first lived in [place] and [place] before finally relocating to [place] in 2016. The patient does not speak English and required the assistance of an  during the intake session.    Cultural impact on health and health care (how does patient s culture influence how the patient receives health care):   The patient described limited access to health care or other resources while living in [place].     Current living situation (Household members, housing status, stability, multiple moves, potential eviction):  Patient currently lives with a friend who she knew from [place].  "The household includes a couple and their kids.    Work History (current employment situation and any past employment history):  Patient currently works as a PCA.    Financial Concerns (basic status, housing, food, clothing are they on any assistance including SSI/SSDI):   No identified financial concerns reported. Patient shared that she was \"surviving.\"    Legal Problems (DUI S, divorce, law suits, etc.):  No legal problems reported at intake.    Hobbies/Interests:    Patient reported that she enjoys cooking.    Family Mental Health/Medical History:    Family Mental Health:   No family mental health history reported at intake.     Family history of Suicide:  No family history of suicide reported at intake.    Family history Chemical Dependency:  No family history of chemical dependency reported at intake.    Family Medical history:  No family medical history reported at intake.    Patient Medical History:    Hospitalizations (When/Where):   No known hospitalizations in [place] reported at intake.     Medical diagnoses/concerns: (i.e.: Heart disease, thyroid problems,  Bld. Pressure,  seizures,  head Inj., Other)   Past Medical History:   Diagnosis Date     Depression      H. pylori infection      Hepatitis B      Current physician/other non psychiatric medical provider's:    Maggie Rowe MD    Date of last medical exam:  3/14/2017    Current Medications:   Current Outpatient Prescriptions   Medication Sig Dispense Refill     ergocalciferol (ERGOCALCIFEROL) 50,000 unit capsule Take 1 capsule (50,000 Units total) by mouth once a week for 12 doses. 12 capsule 3     mirtazapine (REMERON SOL-TAB) 30 MG disintegrating tablet Take 1 tablet (30 mg total) by mouth bedtime. 30 tablet 2     No current facility-administered medications for this visit.      Past Mental Health History:    Previous mental health diagnosis:  No reported previous mental health diagnosis.    Date of diagnosis:  No date of diagnosis reported.    Hx " "of Mental Health Treatment or Services:  No reported history of mental health treatment or services. The patient had limited insight regarding her mental health history.    JAEL Received:     No      Hx of MH Tx/Hospitalizations (When/Where: must include a review of patient s record.  If not available, why, what if anything are you doing to obtain a record?):   No reported history of mental health treatment or hospitalizations.     Hx of Psychiatric Medications:  The patient reported that she obtained \"pills\" while living [place] the [place] which resulted in a seizure in 2003. The patient as unable to describe what the medications were for and focused on the negative side effects. The patient did share that she recently began taking medication to help her with sleep that was prescribed by her doctor about 2 to 3 months ago. She reported that she takes the pill as needed when she can't sleep which is not frequent.      Suicidal/Homicidal Risk Assessment:    Suicidal: None reported  Ideation:none reported  History of Past Attempt(s): description: No history of past attempts reported at intake.  Crisis Plan: No crisis plan needed at intake.    Homicidal: None reported   Ideation:None reported  History of Aggression towards others: No history of aggression towards others reported at intake.  Crisis Plan: No crisis plan required at intake.    History of destruction to property:  Description: No history of destruction to property reported at intake.  Crisis Plan: No crisis plan needed at intake.     Non- Substance Abuse Addictive Behaviors/Compulsive Behaviors:  None Reported    Chemical Use/Abuse History:    CAGE-AID (screening to determine a patients use/abuse/dependency):      0/4      Alcohol:  None Reported  Type:None reported               Frequency: None reported  Age of first use: None reported                         Date of last use: None reported                                                                   "        Street Drugs:  None Reported  Type:None reported               Frequency: None reported  Age of first use: None reported                         Date of last use: None reported     Prescription Drugs:  None Reported  Type:None reported               Frequency: None reported  Age of first use: None reported                         Date of last use: None reported     Tobacco:  None Reported  Type:None reported               Frequency: None reported  Age of first use: None reported                         Date of last use: None reported     Caffeine:  None Reported  Type:None reported               Frequency: None reported  Age of first use: None reported                         Date of last use: None reported     Currently in a treatment program: No     JAEL Received: No          Collaborative info requested/received: No       History of CD Treatment:      None reported             Description: No history of CD treatment reported at intake.    Mental Status Evaluation:  Grooming: Well groomed  Attire: Appropriate  Age: Appears Stated  Behavior Towards Examiner: Guarded/Evasive  Motor Activity: Within normal   Eye Contact: Appropriate  Mood: Depressed  Affect: Flat  Speech/Language: Soft and Monotone  Attention: Distractible  Concentration: Brief  Thought Process: Within normal  Thought Content: Within noramlWithin normal  Orientation: X 3No Evidence of Impairment  Memory: Impairment  Judgement: No Evidence of Impairment  Estimated Intelligence: Average  Demonstrated Insight: Diminished  Fund of Knowledge: adequate       Clinical Impressions/Assessment/Recommendations: (Stands alone; is a synopsis of patients story, any impacting family or cultural issue on diagnosis and how patient meets criteria for diagnosis).   The patient is a 37 year old [place] female who was born in [facility]. She grew up in [facility] [place] [facility] and eventually lived in a refugee camp which she left at age 25. She moved to [place] in 2011 but first lived in  [place] and [place] before finally relocating to [place] in 2016. The patient does not speak English and required the assistance of an  during the intake session. The patient reported that her family members still live in [facility] but shared that she has some friends that live in [place]. The patient reported experiencing a \"seizure\" in 2003 while living in the refugee [place] after which she began noticing a change in her mood due to \"physical problems.\" The patient reported that \"physical problems\" and depression affect her personally and have worsened throughout the past 3 years. The patient was a poor historian and exhibited limited insight regarding underlying mental health symptoms. Thus, the therapist administered the PHQ-9 and SNEHAL-7 to further assess for presenting symptoms. The results from the SNEHAL-7 indicated no to mild symptoms of anxiety with a score of 2. The results from the PHQ-9 indicated moderate symptoms of depression with a score of 12. Therefore, the patient meets DSM-5 criteria for Major Depressive Disorder, Unspecified Type. Due to the patient's complex trauma history, a provisional diagnosis of PTSD will also be considered. However, due to the patient's evasive behavior during the intake session, additional information will need to be collected to determine an appropriate diagnosis. At this time, PTSD will be provided as a rule-out diagnosis until further review. Future sessions will focus on building a therapeutic alliance and helping the patient alleviate signs and symptoms of depression.     Diagnosis:  Major Depressive Disorder, Unspecified Type  R/O PTSD    Assessment of client resolving presenting mental health concerns:    Ability: average   Motivation: low  Willingness: low      Initial Therapy Plan (ex: develop therapeutic relationship with therapist, Refer to psychiatry/psych testing, etc.):    1. Strengthen therapeutic alliance      2. Assess for additional mental " health symptoms      3. Develop treatment plan    Is patient's family involved in the treatment?  No     If no, Why?  Patient does not have any family in [place]    Therapist s Signature/Supervisor/co-signature statement:   Performed and documented by ENOCH Zelaya    I have read, discussed and reviewed the documentation as presented by ENOCH Zelaya, [facility]

## 2021-06-09 NOTE — PROGRESS NOTES
Assessment/plan  1. Hepatitis B  - Ambulatory referral to Gastroenterology  2. Hyperlipidemia  3. Vitamin D deficiency  4. URI (upper respiratory infection)  5. Severe major depression with psychotic features  Reviewed recent blood work results.   Again patient notes she was not aware of hepatitis B, though may have been seen by GI in the past.   Due to chronic hepatitis B, referred to GI for ongoing surveillance. She will follow recommendations.   Reviewed lipid cascade. Discussed lifestyle modifications at length. Will try to improve her diet. Will try to engage in regular exercise. Will recheck at next physical.   Started on vitamin D supplementation. Discussed advantages of normal vitamin D level.   Afebrile. No respiratory distress. Deferred imaging. Supportive care at home. Follow up if no changes or worsening in symptoms.   Will follow up on mental health referral. Discussed importance of establishing care for therapy, counseling. For now defers treatment of her insomnia.         Subjective  [age] year year old female here with .   Here for follow up.   Was seen three weeks for her physical. Has history of chronic hepatitis B. Unclear previous evaluation and treatment. Today she says she was not aware of her hepatitis. Though later recalls imaging for her liver. She denies any skin changes, nausea, vomiting.   She denies history of hyperlipidemia. We discussed blood work results.   She was referred to a mental health provider for mood disorder. She can not recall if she was seen already. She is taking mirtazapine as needed. She notes insomnia is not present every night. She is feeling well otherwise, her depression is ok. She denies suicidal or homicidal ideations. She denies hallucinations.   She is not taking any vitamins at home. She used to take something every week.   She has a cough. It is dry. Present for one and a half weeks. No shortness of breath. No wheezing. No fever. Some  congestion, runny nose. Has tried some over the counter medication but she can not report the name. It did not really help.       Past Medical History:   Diagnosis Date     Depression      H. pylori infection      Hepatitis B      Patient Active Problem List   Diagnosis     Trouble in sleeping     Anemia     Hepatitis B     Severe major depression with psychotic features     Hyperlipidemia     Vitamin D deficiency     Past Surgical History:   Procedure Laterality Date     LIPOMA RESECTION      left face     Family History   Problem Relation Age of Onset     No Medical Problems Mother      Social History     Social History     Marital status: Single     Spouse name: N/A     Number of children: N/A     Years of education: N/A     Occupational History     Not on file.     Social History Main Topics     Smoking status: Never Smoker     Smokeless tobacco: Not on file     Alcohol use No     Drug use: No     Sexual activity: No     Other Topics Concern     Not on file     Social History Narrative     Current Outpatient Prescriptions on File Prior to Visit   Medication Sig Dispense Refill     mirtazapine (REMERON SOL-TAB) 30 MG disintegrating tablet Take 1 tablet (30 mg total) by mouth bedtime. 30 tablet 2     No current facility-administered medications on file prior to visit.      Objective  Vitals:    03/14/17 0813   BP: 102/74   Pulse: 74   Resp: 16       General Appearance:  Alert, cooperative, no distress, appears stated age   Head:  Normocephalic, without obvious abnormality, atraumatic   Eyes:  PERRL, conjunctiva/corneas clear, EOM's intact   Ears:  Normal TM's and external ear canals, both ears   Nose: Nares normal, septum midline,mucosa normal, no drainage    Throat: Lips, mucosa, and tongue normal; posterior pharynx normal   Neck: Supple, symmetrical, trachea midline, no adenopathy;  thyroid: not enlarged, symmetric, no tenderness/mass/nodules; no carotid bruit or JVD   Lungs:   Clear to auscultation bilaterally,  respirations unlabored   Heart:  Regular rate and rhythm, S1 and S2 normal, no murmur, rub, or gallop   Extremities: Extremities normal, atraumatic, no cyanosis or edema   Skin: Skin color, texture, turgor normal, no rashes or lesions   Neurologic: Normal, CN 2-12 intact

## 2021-06-10 NOTE — PROGRESS NOTES
"Mental Health Visit Note    This is a dual signature report. My supervising clinician is BIMAL Bolivar.    4/27/2017    Start time: 8:00am    Stop Time: 9:00am   Session # 2    Silvino Say is a 37 y.o. female is being seen today for    Chief Complaint   Patient presents with     MH Follow Up     Psychotherapy follow up visit to address underlying symptoms of depression      New symptoms or complaints: Patient mostly focused on \"health problems\"    Functional Impairment:   Personal: 4  Family: 2  Work: 2  Social:4    Clinical assessment of mental status:   Grooming: Disheveled  Attire: Appropriate  Age: Appears Stated  Behavior Towards Examiner: Sullen and Guarded/Evasive  Motor Activity: Within normal   Eye Contact: Appropriate  Mood: Depressed  Affect: Constricted, Flat and Depressed  Speech/Language: Soft and Monotone  Attention: Distractible  Concentration: Brief  Thought Process: Slow and difficult to determine due to issues with translation  Thought Content: Hallucinations: none reported  Delusions: none reported  Orientation: X 3  Memory: Impairment, Recent and Remote  Judgement: Impairment and Minimal  Estimated Intelligence: Below Average  Demonstrated Insight: Diminished  Fund of Knowledge: adequate    Suicidal/Homicidal Ideation present: None Reported This Session    Patient's impression of their current status:   The patient presented for a follow-up session and was accompanied by an . She stated, \"I don't feel well today.\" She reported that her skin was warm and chilled. She reported that her hands felt numb and tingly and that she felt nauseous. The patient reported experiencing such symptoms for approximately 3 years. The patient later shared that she has been experiencing \"health problems\" [place]. 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 She reported that she only sleeps 2 to 3 hours per " "night and that this happens every night. She specifically identified problems falling asleep but was unable to elaborate regarding what she was experiencing that disrupted her ability to fall asleep. For instance, she was unable to clarify the presence of racing thoughts or restlessness and simply asserted that she couldn't fall asleep (the problems with translation and elaboration were most likely influenced by the , as this was only one example of problems understanding and lack of clarification). She eventually shared that she had been diagnosed with a liver disease while living in [place]. She reported that the liver disease causes whole body pain and swelling. She was unsure if she was receiving treatment for this in [place]. She stated, \"I want the doctor to give me medications but instead she called 911.\" She reported that this occurred in [place] but again, was unable to provide specific information about this event and was unable to provide clarification (another reference to problems with translation). The patient stated, \"My body feels pain all of the time.\" She also stated, \"When I have health problems I don't want to live\" but \"I don't worry about dying from health problems.\" The patient denied any intent for self-harm and denied any active suicidal ideation. She mostly alluded to feeling overwhelmed with continued \"health problems.\" The patient was asked about day-to-day activities and she reported that she does not like to leave the house because it's too cold. She stated (through the ), \"I read books but I don't read.\" She reported problems with forgetfulness and shared that it is difficult for her to learn new information. The patient then shared how she has had \"health problems since the refugee [place]\" and described the outcome of taking several medications while in the camp. For example, she shared that she had been given medicine to help her sleep (as sleep problems " "have reportedly been present since before living in the refugee camp) and after taking several different medications, she had what the patient identified as a seizure. She described feeling stiff and being unable to move or speak for over 12 hours. When asked about the emotional impact of this event (or how it made her feel) she stated, \"it wasn't scary; It didn't make me feel worried I was just thirsty.\" The patient was unable to recall what medicine she took. The patient stated, \"I think that my health problems won't go away.\" She alluded to ways in which she has tried to be more active, such as going up and down the stairs. However, problems with translation interrupted the provider's ability to clarify statements. The patient stated, \"I just want to feel happy and go out and feel better.\"    Therapist impression of patients current state:   The patient was accompanied by the same  present for her previous sessions. The patient and  do not appear to have good rapport, as minimal eye contact occurs and both the patient and  appear very flat and withdrawn from each other and from the therapist. It was extremely difficult to engage in conversation with the patient which primarily appeared influenced by the 's style in translating. The majority of session appeared to be disrupted by problems clarifying information and frequent misunderstandings. The therapist believes that the miscommunication and lack of understanding generated from problems with translation. Thus, the therapist has requested that a different  be present for future sessions.   The patient's description of \"health problems\" during session was vague and appeared to encompass more physical problems as opposed to mental health symptoms. She utilized this term throughout session but was unable to provide much clarification about what this meant. Furthermore, \"health problems\" appeared to include " "liver problems, nausea and other physical problems but also appeared interchangable when described. With extensive prompting, the patient was finally able to identify feelings of sadness related to chronic health problems.   The patient's recollection of \"having a seizure\" is significant, as she identifies this as the precipitating event causing chronic health problems. More information would be helpful to determine the accuracy of reported events, as the patient is a poor historian. Furthermore, she appeared to have experienced an adverse reaction to medications but she is unable to recall what she consumed. Again, this event is significant because she identifies this as the beginning of all her health problems. However, it is difficult to determine what exactly occurred and if this was actually a seizure.  The therapist has requested that a different  be present for future sessions, in hopes to engage the patient more and receive more assistance in clarifying patient comments. It is difficult to discern whether or not the patient lacks the capacity to engage in conversation or answer clarifying questions due to the apparent problems with accurate translating. Thus, it has been difficult to establish rapport and determine appropriate treatment goals.      Type of psychotherapeutic technique provided: Client centered    Progress toward short term goals:No observable progress    Review of long term goals: Not done at today's visit    Diagnosis:   1. Depression, unspecified depression type    2. PTSD (post-traumatic stress disorder)        Plan and Follow up: Patient will return in 1 month due to therapist limited availability. The patient was encouraged to schedule biweekly visits in advance. The therapist has also requested that a different  be present for future sessions.     Discharge Criteria/Planning: Client has chronic symptoms and ongoing therapy for maintenance stability " recommended.    Mary Angeles 4/27/2017    Performed and documented by ENOCH Zelaya    I have read, discussed and reviewed the documentation as presented by ENOCH Zelaya [facility]

## 2021-06-11 NOTE — PROGRESS NOTES
"Mental Health Visit Note    This is a dual signature report. My supervising clinician is BIMAL Bolivar.    6/29/2017    Start time: 9:00am    Stop Time: 10:00am   Session # 5    Silvino Caceres is a 37 y.o. female is being seen today for    Chief Complaint   Patient presents with     MH Follow Up     Psychotherapy follow-up visit to address symptoms of depression and PTSD in addition to assessing psychotic features     New symptoms or complaints: None    Functional Impairment:   Personal: 4  Family: 2  Work: 2  Social:4    Clinical assessment of mental status:   Grooming: Well groomed  Attire: Appropriate  Age: Appears Stated  Behavior Towards Examiner: Sullen and Guarded/Evasive  Motor Activity: Within normal   Eye Contact: Appropriate  Mood: Depressed  Affect: Constricted, Flat and Depressed  Speech/Language: Soft and Monotone  Attention: Distractible  Concentration: Brief  Thought Process: Slow  Thought Content: Hallucinations: Visual and possible auditory and somatic  Delusions: none reported  Orientation: X 3  Memory: Impairment, Recent and Remote  Judgement: Impairment and Minimal  Estimated Intelligence: Below Average  Demonstrated Insight: Diminished  Fund of Knowledge: adequate    Suicidal/Homicidal Ideation present: None Reported This Session    Patient's impression of their current status:   The patient presented for a follow-up session and was accompanied by an . She reported that she did not sleep well the night before and is rarely able to sleep through the night. She denied experiencing any nightmares and reported that she gets up in the night to use the bathroom. The therapist inquired about the \"scary stuff\" the patient had reported during her previous psychotherapy session on 6/15/2017. Initially she denied saying that she saw anything scary in her bedroom. With extensive prompting and review of therapist notes, the patient reported that one time she saw someone outside running from " "the . She also \"saw scary stuff\" outside her apartment in [place]. She denied \"seeing scary stuff\" in [place] in her bedroom despite reporting such events last session. Thus, she denied any further endorsement of psychotic features and was unable to provide more clarifying information about \"seeing scary stuff.\" The patient reported that she feels most worried about the sickness in her body. She identified \"the sickness\" as physical symptoms of depression such as dizziness, body aches, fatigue, low energy, and low appetite. She denied taking any medications at the moment. She reported that she has not felt happy since leaving the refugee [place] and does not participate in any activities she enjoys. She expressed concern that her \"sickness\" is getting worse and does not believe that it will get better. She ended session by reporting that she does not believe the therapist that things could get better with adequate mental health treatment.    Therapist impression of patients current state:   The patient appeared withdrawn, sullen and depressed during today's session. The therapist found the patient asleep in the lobby when gathering her for the appointment. The patient endorsed severe symptoms of depression with limited insight regarding mental health concepts. For instance, the therapist inquired about her knowledge of mental health terms, problems and treatments and she reported that she had never heard of these terms before. The therapist provided psycho-education about depression and mental health treatment. Despite continuously checking for understanding and comprehension, the patient did not appear to be able to conceptualize concepts discussed in the conversation. She was unable to provide much clarification about what she understood but did eventually share that she agreed she was experiencing some symptoms of depression described above. She is a poor historian and often provides limited feedback or clarifying " "information. Based upon her body language, tone of voice and limited participation in dialogue with the provider, the patient appeared sullen and unwilling to engage in the session. She also appeared to be holding onto negative beliefs that she would never feel better which the therapist was unsuccessful in helping her readjust or reframe despite extensive efforts. She demonstrated limited insight and limited motivation which created barriers to exploring opportunities for change in addition to the existing language and cultural barrier. The therapist was able to engage the patient in some conversation about ways to treat depression such as beginning with a structured daily routine. The therapist created a visual aid which included pictures of brushing teeth, bath/shower, brushing hair, getting dressed, eating and drinking water. She was encouraged to complete these 6 tasks every morning as a way to begin building a more meaningful and independent daily routine. She reported that she \"has already tried everything and nothing has gotten better.\" However, she eventually agreed to try to complete the 6 tasks everyday but appeared resistant to the idea that it would be helpful or would be a different way to improve her daily life. Overall, the patient appeared extremely resistant today and the therapist is unsure if she will continue to attend future appointments despite efforts to increase her motivation to engage in treating symptoms of depression.     Type of psychotherapeutic technique provided: Client centered, Solution-focused and CBT and motivational interviewing    Progress toward short term goals:Poor progress, as patient appeared extremely resistant today    Review of long term goals: The patient wants to \"get rid of health problems\" such as reducing dizziness, headaches and fatigue    Diagnosis:   1. Severe episode of recurrent major depressive disorder, with psychotic features    2. PTSD (post-traumatic " stress disorder)        Plan and Follow up: Patient will return on 7/20/2017    Discharge Criteria/Planning: Client has chronic symptoms and ongoing therapy for maintenance stability recommended.    Mary Angeles 6/29/2017    Performed and documented by ENOCH Zelaya    I have read, discussed and reviewed the documentation as presented by ENOCH Zelaya [facility]

## 2021-06-11 NOTE — PROGRESS NOTES
"Mental Health Visit Note    This is a dual signature report. My supervising clinician is BIMAL Bolivar.    6/15/2017    Start time: 9:00am    Stop Time: 10:00am   Session # 4    Silvino Caceres is a 37 y.o. female is being seen today for    Chief Complaint   Patient presents with     MH Follow Up     Psychotherapy follow-up visit to address symptoms of depression and also evaluate other underlying mental health symptoms such as psychosis and ptsd     New symptoms or complaints: The patient reported severe symptoms of depression such as decreased appetite, fatigue, low energy, low motivation, headaches, dizziness, sleep disturbances and she also endorsed psychotic features    Functional Impairment:   Personal: 4  Family: 2  Work: 2  Social:4    Clinical assessment of mental status:   Grooming: Well groomed  Attire: Appropriate  Age: Appears Stated  Behavior Towards Examiner: Sullen and Guarded/Evasive  Motor Activity: Within normal   Eye Contact: Appropriate  Mood: Depressed  Affect: Constricted, Flat and Depressed  Speech/Language: Soft and Monotone  Attention: Distractible  Concentration: Brief  Thought Process: Slow  Thought Content: Hallucinations: Visual and possible auditory and somatic  Delusions: none reported  Orientation: X 3  Memory: Impairment, Recent and Remote  Judgement: Impairment and Minimal  Estimated Intelligence: Below Average  Demonstrated Insight: Diminished  Fund of Knowledge: adequate    Suicidal/Homicidal Ideation present: None Reported This Session    Patient's impression of their current status:   The patient presented for a follow-up session and was accompanied by an  (Deng). The patient described how the following symptoms have been happening for a long time: dizziness, headache, hot head (\"fever\"), fatigue and poor sleep. The patient reported that she woke up in the morning and felt dizzy but the dizziness decreased when she was cooking rice. The patient reported that she " "does not eat if she feels dizzy and stated, \"sometimes I don't eat at all.\" The patient described experiencing a low appetite and described how she has to force herself to eat which makes it difficult to swallow. The patient reported that she typically eats 1x/day and was unsure the date of the last time she did not eat anything at all. The patient stated, \"I always feel tired and cannot walk far\" and shared how she rarely sleeps through the night. While talking about her sleep patterns, the patient disclosed \"seeing scary things\" at night. She stated, \"sometimes I see things; sometimes I see someone doing something not right; I don't know the person; it happens in my bedroom.\" The patient stated, \"It's hard because it's imaginary and I've been experiencing this for a long time since back in [facility] and [facility].\" The patient reported that it feels like \"it's getting worse.\" The patient also stated, \"no one has been able to help me before\" and \"I used to get teased a lot back in [facility].\"   *This was the first time patient has disclosed information endorsing psychotic features    Therapist impression of patients current state:   The patient was accompanied by a different  today (Deng) and appeared to be more open and honest about her experiences based upon what was reported during session. The therapist is concerned about the patient's eating habits. The patient is a poor historian but described a tendency to refrain from eating at all during the day due to having no appetite. The patient's lack of a healthy diet could be contributing to physical symptoms such as dizziness, headaches and fatigue. It may also be impacting her mood and decreasing motivation to engage in meaningful activities. The therapist intends to inquire more about the patient's eating habits and help the patient brainstorm ideas for maintaining a balanced diet. The therapist is also concerned about the patient's sleep patterns. She " "described having difficulty sleeping and shared that she often does not sleep for several days in a row. The therapist intends to help the patient improve her sleep hygiene and explore interventions the patient could implement independently. The patient also endorsed psychotic symptoms for the first time during today's session. She described experiencing visual hallucinations which she described as scary. The patient was visibly apprehensive sharing about these experiences and appeared embarrassed to describe the events, as she identified them as imaginary. Furthermore, the patient alluded to being teased in the past but did not directly relate this to any specific symptoms. However, the mention of being teased appeared significant in explaining her hesitancy in describing possible psychotic features. The therapist provided positive reinforcement and encouraged the patient to utilize therapy as a tool for making changes and gaining support. She was reminded that she is not alone and is brave for sharing her story. The therapist hopes that the patient will revisit these symptoms in future sessions so that the therapist can adequately assess the severity, duration and frequency of reported symptoms. At this time, more information is needed to determine an alternative diagnosis highlighting psychotic symptoms. The therapist hopes to continue developing a strong therapeutic alliance with the patient so she feels comfortable disclosing her symptoms in future sessions, as this will allow the therapist to gain more information necessary in determining appropriate treatment interventions.      Type of psychotherapeutic technique provided: Client centered    Progress toward short term goals:Some progress in providing more information about underlying mental health symptoms; patient may be gaining more trust in the therapist    Review of long term goals: The patient wants to \"get rid of health problems\" such as reducing " dizziness, headaches and fatigue    Diagnosis:   1. Severe episode of recurrent major depressive disorder, with psychotic features    2. PTSD (post-traumatic stress disorder)        Plan and Follow up: Patient will return on 6/29/2017.    Discharge Criteria/Planning: Client has chronic symptoms and ongoing therapy for maintenance stability recommended.    Mary Angeles 6/16/2017    Performed and documented by ENOCH Zelaya    I have read, discussed and reviewed the documentation as presented by ENOCH Zelaya LICSW

## 2021-06-11 NOTE — PROGRESS NOTES
Assessment:     1. Encounter for general adult medical examination without abnormal findings  - Lipid Cascade  - Chlamydia trachomatis & Neisseria gonorrhoeae (under 25 yrs)  - Wet Prep, Vaginal  2. Encounter for screening for cervical cancer   - Gynecologic Cytology (PAP Smear)  3. Encounter for screening for diabetes mellitus  - Glucose  4. Vitamin D deficiency  - Vitamin D, Total (25-Hydroxy)  5. Persistent depressive disorder with anxious distress, currently moderate  6. PTSD (post-traumatic stress disorder)  7. Hyperpigmentation  - Ambulatory referral to Dermatology  8. Seasonal allergic conjunctivitis  - ketotifen (ZADITOR/ZYRTEC ITCHY EYES) 0.025 % (0.035 %) ophthalmic solution; 1 drop in each eye 1-2 times a day, as needed.  Dispense: 10 mL; Refill: 3  - cetirizine (ZYRTEC) 10 MG tablet; Take 1 tablet (10 mg total) by mouth daily.  Dispense: 30 tablet; Refill: 2    Plan:      All questions answered.  Breast self exam technique reviewed and patient encouraged to perform self-exam monthly.  Discussed healthy lifestyle modifications.   Pap smear collected.   Check wet prep due to recurrent vaginal itching concern.  Reports mood is stable. She still denies therapy.   Concern about the skin over her face. We discussed possible evaluation and treatment. Referred to dermatology. Will follow recommendations.   No signs of infection. Refilled ketotifen. Started zyrtec. Follow up if no better or worsening.         Subjective:      Silvino Caceres is a 40 y.o. female who presents for an annual exam. The patient is not sexually active. The patient participates in regular exercise: no. The patient reports that there is not domestic violence in her life.   She has several concerns.   Is not aware of previous pap smear. She has ongoing vaginal itching, especially before her menstrual cycle. She has no vaginal itching now. Has been treated for yeast infection several times. Lately she has itching in her eyes and head. She has  used an eye drop in the past. She has been taking an allergy pill, she does not know what it is called. She would like to have her own. Sometimes her skin is itching. It is worse in the spring but is having a lot itching for the last week.   She has dark patches on her face. She wants to have this treated. It has been worsening over the last years. There is no lumps or bumps on her skin. She uses a traditional Yareli paste on her skin. She can not report the reason for this yellow paste.        Healthy Habits:   Regular Exercise: No  Sunscreen Use: No  Healthy Diet: Yes  Dental Visits Regularly: No  Seat Belt: Yes  Sexually active: No  Self Breast Exam Monthly:No  Hemoccults: N/A  Flex Sig: N/A  Colonoscopy: N/A  Lipid Profile: Yes  Glucose Screen: Yes  Prevention of Osteoporosis: No  Last Dexa: N/A  Guns at Home:  No      Immunization History   Administered Date(s) Administered     Hepatitis A: Immune 2015     INFLUENZA,SEASONAL QUAD, PF, =/> 6months 2018, 2019     Influenza, inj, historic,unspecified 2016     Influenza,seasonal,quad inj =/> 6months 2017     Tdap 2014     Immunization status: up to date and documented.    No exam data present    Gynecologic History  Patient's last menstrual period was 2020.  Contraception: none  Last Pap: none.   Last mammogram: none      OB History    Para Term  AB Living   0 0 0 0 0 0   SAB TAB Ectopic Multiple Live Births   0 0 0 0 0       Current Outpatient Medications   Medication Sig Dispense Refill     acetaminophen (TYLENOL EXTRA STRENGTH) 500 MG tablet Take 1 tablet (500 mg total) by mouth every 6 (six) hours as needed for pain. 100 tablet 0     cetirizine (ZYRTEC) 10 MG tablet Take 1 tablet (10 mg total) by mouth daily. 30 tablet 2     cholecalciferol, vitamin D3, (VITAMIN D3) 2,000 unit Tab Take 1 tablet (2,000 Units total) by mouth daily. 100 tablet 1     famotidine (PEPCID) 40 MG tablet Take 1 tablet (40 mg total)  by mouth every evening. 30 tablet 2     hydrocortisone (ANUSOL-HC) 2.5 % rectal cream Apply topically to affected area BID - no longer than 14 days 30 g 1     ketotifen (ZADITOR/ZYRTEC ITCHY EYES) 0.025 % (0.035 %) ophthalmic solution 1 drop in each eye 1-2 times a day, as needed. 10 mL 3     miconazole (MICATIN) 2 % cream Apply to affected area 2 times daily 15 g 1     No current facility-administered medications for this visit.      Past Medical History:   Diagnosis Date     Depression      H. pylori infection      Hepatitis B      Past Surgical History:   Procedure Laterality Date     LIPOMA RESECTION      left face     Patient has no known allergies.  Family History   Problem Relation Age of Onset     No Medical Problems Mother      Social History     Socioeconomic History     Marital status: Single     Spouse name: Not on file     Number of children: Not on file     Years of education: Not on file     Highest education level: Not on file   Occupational History     Not on file   Social Needs     Financial resource strain: Not on file     Food insecurity     Worry: Not on file     Inability: Not on file     Transportation needs     Medical: Not on file     Non-medical: Not on file   Tobacco Use     Smoking status: Never Smoker     Smokeless tobacco: Current User     Tobacco comment: betle nut   Substance and Sexual Activity     Alcohol use: No     Drug use: No     Sexual activity: Never   Lifestyle     Physical activity     Days per week: Not on file     Minutes per session: Not on file     Stress: Not on file   Relationships     Social connections     Talks on phone: Not on file     Gets together: Not on file     Attends [employer] service: Not on file     Active member of club or organization: Not on file     Attends meetings of clubs or organizations: Not on file     Relationship status: Not on file     Intimate partner violence     Fear of current or ex partner: Not on file     Emotionally abused: Not on file  "    Physically abused: Not on file     Forced sexual activity: Not on file   Other Topics Concern     Not on file   Social History Narrative     Not on file       Review of Systems  General:  Denies problem  Eyes: Denies problem  Ears/Nose/Throat: Denies problem  Cardiovascular: Denies problem  Respiratory:  Denies problem  Gastrointestinal:  Denies problem  Genitourinary: Denies problem  Musculoskeletal:  Denies problem  Skin: dark patches  Neurologic: Denies problem  Psychiatric: Denies problem  Endocrine: Denies problem  Heme/Lymphatic: Denies problem   Allergic/Immunologic: itching eyes, skin, head    Objective:         Vitals:    09/01/20 0815   BP: 92/64   Pulse: 68   Resp: 16   Temp: 97.8  F (36.6  C)   TempSrc: Oral   Weight: 115 lb (52.2 kg)   Height: 5' 0.75\" (1.543 m)     Body mass index is 21.91 kg/m .    Physical Exam:  General Appearance: Alert, cooperative, no distress, appears stated age  Head: Normocephalic, without obvious abnormality, atraumatic  Eyes: PERRL, conjunctiva/corneas clear, EOM's intact  Ears: Normal TM's and external ear canals, both ears  Nose: Nares normal, septum midline,mucosa normal, no drainage  Throat: Lips, mucosa, and tongue normal; teeth and gums normal  Neck: Supple, symmetrical, trachea midline, no adenopathy;  thyroid: not enlarged, symmetric, no tenderness/mass/nodules; no carotid bruit or JVD  Back: Symmetric, no curvature, ROM normal, no CVA tenderness  Lungs: Clear to auscultation bilaterally, respirations unlabored  Breasts: No breast masses, tenderness, asymmetry, or nipple discharge.  Heart: Regular rate and rhythm, S1 and S2 normal, no murmur, rub, or gallop  Abdomen: Soft, non-tender, bowel sounds active all four quadrants,  no masses, no organomegaly  Pelvic:Normally developed genitalia with no external lesions or eruptions. Vagina and cervix show no lesions, inflammation, discharge or tenderness. No cystocele, No rectocele. Uterus normal.  No adnexal mass or " tenderness.  Extremities: Extremities normal, atraumatic, no cyanosis or edema  Skin: yellow paste over face, hyperpigmented patches over her cheeks bilaterally  Lymph nodes: Cervical, supraclavicular, and axillary nodes normal  Neurologic: Normal
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
## 2021-06-11 NOTE — PROGRESS NOTES
Assessment/plan  1. Cold intolerance  We discussed possible etiology.   Though no red flag signs or signs of vascular concerns.   Reassured patient her pulses are intact. Her skin is warm. Her circulation appears normal.   She defers blood work but would consider hemogram and thyroid check at later time. Past blood work reviewed and this was normal in February.   She will wear socks.   She will monitor her symptoms.   Will follow up if there is any worsening or no change.         Subjective  [age] year old female complains of cold feet. When roomed she mentioned her hands being cold but now reports only her feet concern her. She says she tried to cover her feet with blankets. She still feels cold. This is not new. Is present since more than ten years ago. She says her hands used to be cold but not any more. She denies skin changes. No numbness or tingling. No injury. Her back is normal. She is well otherwise. Is seeking care for mental health, she says she is following up as directed. n      Past Medical History:   Diagnosis Date     Depression      H. pylori infection      Hepatitis B      Patient Active Problem List   Diagnosis     Trouble in sleeping     Anemia     Hepatitis B     Severe major depression with psychotic features     Hyperlipidemia     Vitamin D deficiency     Past Surgical History:   Procedure Laterality Date     LIPOMA RESECTION      left face     Family History   Problem Relation Age of Onset     No Medical Problems Mother      Social History     Social History     Marital status: Single     Spouse name: N/A     Number of children: N/A     Years of education: N/A     Occupational History     Not on file.     Social History Main Topics     Smoking status: Never Smoker     Smokeless tobacco: Not on file     Alcohol use No     Drug use: No     Sexual activity: No     Other Topics Concern     Not on file     Social History Narrative     Current Outpatient Prescriptions on File Prior to Visit    Medication Sig Dispense Refill     mirtazapine (REMERON SOL-TAB) 30 MG disintegrating tablet Take 1 tablet (30 mg total) by mouth bedtime. 30 tablet 2     No current facility-administered medications on file prior to visit.      Objective  Vitals:    06/01/17 1516   BP: 98/70   Pulse: 74   Resp: 20       General Appearance:  Alert, cooperative, no distress, appears stated age   Head:  Normocephalic, without obvious abnormality, atraumatic   Eyes:  PERRL, conjunctiva/corneas clear, EOM's intact   Throat: Lips, mucosa, and tongue normal   Neck: Supple, symmetrical, trachea midline, no adenopathy;  thyroid: not enlarged, symmetric   Lungs:   Clear to auscultation bilaterally, respirations unlabored   Heart:  Regular rate and rhythm, S1 and S2 normal, no murmur, rub, or gallop   Extremities: Extremities normal, atraumatic, no cyanosis or edema, pulses 2 + in all extremities   Skin: Skin color, texture, turgor normal, no rashes or lesions, warm and dry   Neurologic: Normal, CN 2-12 intact, normal reflexes, normal gait

## 2021-06-12 NOTE — PROGRESS NOTES
"Assessment/plan  1. Vaginitis  Treated with fluconazole.   Discussed appropriate use.   Pap smear up to date. Follow up as needed.   - Wet Prep, Vaginal  - Chlamydia trachomatis & Neisseria gonorrhoeae, Amplified Detection    2. Hepatitis B  Discussed natural course and history.   Needs follow up.   Asymptomatic.   - Hepatitis B Surface Antigen (HBsAG)  - Hepatitis B Surface Antibody (Anti-HBs) Vaccine Check  - Hepatitis B Virus (HBV) DNA, Detection and Quantification by RT-PCR ($$$)  - Hepatitis Be Antibody (Anti-HBe)  - Hepatitis Be Antigen  - Hepatitis C Virus (HCV) RNA Detection and Quantification by RT-PCR ($$$)  - Comprehensive Metabolic Panel  - AFP-Tumor Marker    3. Severe major depression with psychotic features  Patient declines follow up with therapy and counseling.   Is not taking mirtazapine.   Defers further evaluation.   Discussed reasons to be seen urgently.   Follow up on this in three months.         Subjective  [age] year old female here with her .   Complains of vaginal itching. Some white discharge. This is intermittent. She thinks this first started one year ago. Denies any pelvic pain. No dysuria. No abnormal bleeding. She is not concerned about sexually transmitted infection. She would like to be checked.   Of note, she has hepatitis B. She feels well. No recent follow up. Denies nausea, vomiting, abdominal pain, skin changes.   We also discussed her depression. EHR was reviewed. Was working with psychotherapy for individual counseling. She did not show up for her last visit. She says she does not intend on following up. She thinks her depression is \"good.\" she denies any recent suicidal or homicidal ideations. She denies recent hallucinations.       Past Medical History:   Diagnosis Date     Depression      H. pylori infection      Hepatitis B      Patient Active Problem List   Diagnosis     Trouble in sleeping     Anemia     Hepatitis B     Severe major depression with " psychotic features     Hyperlipidemia     Vitamin D deficiency     Past Surgical History:   Procedure Laterality Date     LIPOMA RESECTION      left face     Family History   Problem Relation Age of Onset     No Medical Problems Mother      Social History     Social History     Marital status: Single     Spouse name: N/A     Number of children: N/A     Years of education: N/A     Occupational History     Not on file.     Social History Main Topics     Smoking status: Never Smoker     Smokeless tobacco: Not on file     Alcohol use No     Drug use: No     Sexual activity: No     Other Topics Concern     Not on file     Social History Narrative     Current Outpatient Prescriptions on File Prior to Visit   Medication Sig Dispense Refill     mirtazapine (REMERON SOL-TAB) 30 MG disintegrating tablet Take 1 tablet (30 mg total) by mouth bedtime. 30 tablet 2     No current facility-administered medications on file prior to visit.      Objective  Vitals:    08/29/17 1346   BP: 98/64   Pulse: 92   Resp: 16       General Appearance:  Alert, cooperative, no distress, appears stated age   Head:  Normocephalic, without obvious abnormality, atraumatic   Eyes:  PERRL, conjunctiva/corneas clear, EOM's intact   Ears:  Normal TM's and external ear canals, both ears   Throat: Lips, mucosa, and tongue normal; teeth and gums normal   Neck: Supple   Lungs:   Clear to auscultation bilaterally, respirations unlabored   Heart:  Regular rate and rhythm, S1 and S2 normal, no murmur   Abdomen:   Soft, non-tender, bowel sounds active all four quadrants,  no masses, no organomegaly   Genitalia: Normally developed genitalia with no external lesions or eruptions.  Vagina and cervix show no lesions, inflammation, discharge or tenderness.  No cystocele.  Uterus normal size and position.  No adnexal mass or tenderness.   Extremities: Extremities normal, atraumatic, no cyanosis or edema   Neurologic: Normal, CN 2-12 intact       Recent Results (from  the past 240 hour(s))   Hepatitis B Surface Antigen (HBsAG)   Result Value Ref Range    Hepatitis B Surface Ag Positive, Previous Confirm (!) Negative   Hepatitis B Surface Antibody (Anti-HBs) Vaccine Check   Result Value Ref Range    HBV Surface Ab (Vaccine Check) Negative (!) Positive   Hepatitis B Virus (HBV) DNA, Detection and Quantification by RT-PCR ($$$)   Result Value Ref Range    HBV DNA Detection/Quantitative 21 (!) Undetected IU/mL   Hepatitis Be Antibody (Anti-HBe)   Result Value Ref Range    HBe Antibody Positive (!) Negative   Hepatitis Be Antigen   Result Value Ref Range    Hepatitis Be Antigen Negative Negative   Hepatitis C Virus (HCV) RNA Detection and Quantification by RT-PCR ($$$)   Result Value Ref Range    HCV RNA Detect/Quant Undetected Undetected IU/mL   Comprehensive Metabolic Panel   Result Value Ref Range    Sodium 139 136 - 145 mmol/L    Potassium 3.8 3.5 - 5.0 mmol/L    Chloride 106 98 - 107 mmol/L    CO2 25 22 - 31 mmol/L    Anion Gap, Calculation 8 5 - 18 mmol/L    Glucose 81 70 - 125 mg/dL    BUN 15 8 - 22 mg/dL    Creatinine 0.79 0.60 - 1.10 mg/dL    GFR MDRD Af Amer >60 >60 mL/min/1.73m2    GFR MDRD Non Af Amer >60 >60 mL/min/1.73m2    Bilirubin, Total 0.4 0.0 - 1.0 mg/dL    Calcium 8.7 8.5 - 10.5 mg/dL    Protein, Total 7.0 6.0 - 8.0 g/dL    Albumin 3.4 (L) 3.5 - 5.0 g/dL    Alkaline Phosphatase 59 45 - 120 U/L    AST 15 0 - 40 U/L    ALT 11 0 - 45 U/L   AFP-Tumor Marker   Result Value Ref Range    AFP-Tumor Marker 2.5 <=8.5 ug/mL   Chlamydia trachomatis & Neisseria gonorrhoeae, Amplified Detection   Result Value Ref Range    Chlamydia trachomatis, Amplified Detection Negative Negative    Neisseria gonorrhoeae, Amplified Detection Negative Negative   Wet Prep, Vaginal   Result Value Ref Range    Yeast Result Yeast Seen (!) No yeast seen    Trichomonas No Trichomonas seen No Trichomonas seen    Clue Cells, Wet Prep No Clue cells seen No Clue cells seen   '
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
## 2021-06-13 NOTE — PROGRESS NOTES
Assessment/plan  1. Elevated lipase  - Lipase  - Hepatic Profile  - Amylase  2. RUQ abdominal pain  Unclear etiology.   Some ongoing intermittent symptoms so will recheck liver enzymes, lipase, amylase.   Discussed options of imaging.   Scheduled for CT abdomen. Will follow results.   Discussed reasons to be seen urgently.   Will follow up in two weeks.         Subjective  [age] year old female with history of mood disorder, hepatitis B, here for follow up of elevated lipase. This is ongoing. RUQ ultrasound was normal.   Today she reports ongoing pain in the epigastric area though it is not everyday. History is difficult to obtain. She is not able to rate the pain. Is not able to explain exacerbating or relieving factors. Unknown if this is food related.   She denies illicit drug use. She denies alcohol use.   She denies history of elevated lipase.     Past Medical History:   Diagnosis Date     Depression      H. pylori infection      Hepatitis B      Patient Active Problem List   Diagnosis     Trouble in sleeping     Anemia     Hepatitis B     Severe major depression with psychotic features     Hyperlipidemia     Vitamin D deficiency     Past Surgical History:   Procedure Laterality Date     LIPOMA RESECTION      left face     Family History   Problem Relation Age of Onset     No Medical Problems Mother      Social History     Social History     Marital status: Single     Spouse name: N/A     Number of children: N/A     Years of education: N/A     Occupational History     Not on file.     Social History Main Topics     Smoking status: Never Smoker     Smokeless tobacco: Not on file     Alcohol use No     Drug use: No     Sexual activity: No     Other Topics Concern     Not on file     Social History Narrative     Current Outpatient Prescriptions on File Prior to Visit   Medication Sig Dispense Refill     cholecalciferol, vitamin D3, (VITAMIN D3) 2,000 unit Tab Take 1 tablet (2,000 Units total) by mouth  daily. 100 tablet 3     mirtazapine (REMERON) 30 MG tablet Take 1 tablet (30 mg total) by mouth at bedtime. 30 tablet 5     No current facility-administered medications on file prior to visit.      Objective  Vitals:    10/19/17 1434   BP: 90/60   Pulse: 76   Resp: 20       General Appearance:  Alert, cooperative, no distress, appears stated age   Head:  Normocephalic, without obvious abnormality, atraumatic   Eyes:  PERRL, conjunctiva/corneas clear, EOM's intact   Throat: Lips, mucosa, and tongue normal; teeth and gums normal   Neck: Supple, symmetrical, trachea midline, no adenopathy;  thyroid: not enlarged, symmetric, no tenderness/mass/nodules; no carotid bruit or JVD   Lungs:   Clear to auscultation bilaterally, respirations unlabored   Heart:  Regular rate and rhythm, S1 and S2 normal, no murmur, rub, or gallop   Abdomen:   Soft, mild epigastric tenderness, bowel sounds active all four quadrants,  no masses, no organomegaly, no rebound, no guarding   Extremities: Extremities normal, atraumatic, no cyanosis or edema   Skin: Skin color, texture, turgor normal, no rashes or lesions   Neurologic: Normal, CN 2-12 intact         Recent Results (from the past 240 hour(s))   Lipase   Result Value Ref Range    Lipase 147 (H) 0 - 52 U/L   Hepatic Profile   Result Value Ref Range    Bilirubin, Total 0.3 0.0 - 1.0 mg/dL    Bilirubin, Direct 0.1 <=0.5 mg/dL    Protein, Total 7.7 6.0 - 8.0 g/dL    Albumin 3.5 3.5 - 5.0 g/dL    Alkaline Phosphatase 58 45 - 120 U/L    AST 17 0 - 40 U/L    ALT 12 0 - 45 U/L   Amylase   Result Value Ref Range    Amylase 92 5 - 120 U/L

## 2021-06-13 NOTE — PROGRESS NOTES
Assessment/plan  1. Elevated lipase  Lipase level elevated again.   Unclear etiology. Other liver enzymes are normal. Not concerned for medication affect but will monitor with use. Denies alcohol use. Denies other drug use.   Appears well today. No signs of acute abdomen.   Reviewed recent ultrasound and CT results with patient.   Strongly urged to avoid any alcohol, drug, medication use besides that prescribed.   Will follow up on this in one month.   - Lipase  - Amylase  - Hepatic Profile    2. Hepatitis B  Needs follow up on six months, EHR and last GI documentation reviewed.     3. Severe major depression with psychotic features  Uncontrolled.   She agrees to restart therapy.   Will follow recommendations.   On remeron.   - Ambulatory referral to Psychology        Subjective  [age] year old female here with her .   Here for follow up.   Has been seen recently for elevated lipase. She had epigastric pain. She notes this is coming and going now. Is well today. Last lipase was improving. She needs repeat today. She is tolerating diet. Normal urine and stool. Again she denies alcohol use. Denies drug use. Denies use of any other herbal or traditional medications.   She notes she is having trouble sleeping, feeling sad, worried. She was working with a therapist in the past. She decided to stop her care. She thinks she might be ready to seek therapy again. She says she is taking remeron at night time.   She denies suicidal or homicidal ideations. She denies hallucinations today.     ROS: 12 systems reviewed, all negative except for what is mentioned in HPI.     Past Medical History:   Diagnosis Date     Depression      H. pylori infection      Hepatitis B      Patient Active Problem List   Diagnosis     Trouble in sleeping     Anemia     Hepatitis B     Severe major depression with psychotic features     Hyperlipidemia     Vitamin D deficiency     Past Surgical History:   Procedure Laterality Date      LIPOMA RESECTION      left face     Family History   Problem Relation Age of Onset     No Medical Problems Mother      Social History     Social History     Marital status: Single     Spouse name: N/A     Number of children: N/A     Years of education: N/A     Occupational History     Not on file.     Social History Main Topics     Smoking status: Never Smoker     Smokeless tobacco: Not on file     Alcohol use No     Drug use: No     Sexual activity: No     Other Topics Concern     Not on file     Social History Narrative     Current Outpatient Prescriptions on File Prior to Visit   Medication Sig Dispense Refill     cholecalciferol, vitamin D3, (VITAMIN D3) 2,000 unit Tab Take 1 tablet (2,000 Units total) by mouth daily. 100 tablet 3     mirtazapine (REMERON) 30 MG tablet Take 1 tablet (30 mg total) by mouth at bedtime. 30 tablet 5     No current facility-administered medications on file prior to visit.      Objective  Vitals:    11/01/17 1542   BP: 124/84   Pulse: 88       General Appearance:  Alert, cooperative, no distress, appears stated age   Head:  Normocephalic, without obvious abnormality, atraumatic   Eyes:  PERRL, conjunctiva/corneas clear, EOM's intact   Ears:  Normal TM's and external ear canals, both ears   Nose: Nares normal, septum midline,mucosa normal, no drainage    Throat: Lips, mucosa, and tongue normal   Neck: Supple, symmetrical, trachea midline, no adenopathy;  thyroid: not enlarged, symmetric, no tenderness/mass/nodules; no carotid bruit or JVD   Lungs:   Clear to auscultation bilaterally, respirations unlabored   Heart:  Regular rate and rhythm, S1 and S2 normal, no murmur   Abdomen:   Soft, non-tender, bowel sounds active all four quadrants,  no masses, no organomegaly   Extremities: Extremities normal, atraumatic, no cyanosis or edema   Skin: Skin color, texture, turgor normal, no rashes or lesions   Lymph nodes: Cervical, supraclavicular nodes normal   Neurologic: Normal, CN 2-12  intact       Recent Results (from the past 240 hour(s))   Lipase   Result Value Ref Range    Lipase 236 (H) 0 - 52 U/L   Amylase   Result Value Ref Range    Amylase 93 5 - 120 U/L   Hepatic Profile   Result Value Ref Range    Bilirubin, Total 0.1 0.0 - 1.0 mg/dL    Bilirubin, Direct <0.1 <=0.5 mg/dL    Protein, Total 7.1 6.0 - 8.0 g/dL    Albumin 3.2 (L) 3.5 - 5.0 g/dL    Alkaline Phosphatase 60 45 - 120 U/L    AST 14 0 - 40 U/L    ALT 12 0 - 45 U/L

## 2021-06-13 NOTE — PROGRESS NOTES
Assessment/plan  1. RUQ abdominal pain  - Comprehensive Metabolic Panel  - HM2(CBC w/o Differential)  - Lipase  - US Abdomen Limited; Future  2. Hepatitis B  - US Abdomen Limited; Future  Unclear etiology.   Discussed possible causes including gall bladder disease, pancreatitis, gastritis, chronic hepatitis, viral.   Check liver enzymes. Check lipase. No recent imaging. Check ultrasound. No signs of acute abdomen.   Discussed reasons to be seen urgently.   Will follow results and imaging and determine follow up.   Consider clear liquid diet for now, advance as tolerated.     3. Severe major depression with psychotic features  Unclear control.   Appears not well controlled.   She declines therapy.   Agrees to restart remeron.   Start vitamin D supplementation with previous deficiency.  Follow up on this in one month.     4. Need for prophylactic vaccination and inoculation against influenza  - Influenza, Seasonal,Quad Inj, 36+ MOS    5. Trouble in sleeping  As above.   - mirtazapine (REMERON SOL-TAB) 30 MG disintegrating tablet; Take 1 tablet (30 mg total) by mouth at bedtime.  Dispense: 30 tablet; Refill: 5            Subjective  [age] year old female here with her .   She notes abdominal pain. Located in right upper quadrant, sometimes across her stomach. She says it started four or five months ago. Has had this kind of pain before. It does not usually stay. She thinks it is intermittent. Can not rate the pain. When asked several times does not say it is associated with food intake. When asked several times she can not report exacerbating or relieving factors. She denies nausea, vomiting, change in appetite. Reports regular bowel movements. Normal urine. She notes difficulty sleeping. She is not sure why. She says she has worries. She refused to continue work with a therapist. She does not want to be seen again. She denies suicidal or homicidal ideations. She says she has pain all over sometimes.  No recent fall or injury. Is not able to quantify her sleep. Is not working. Single. Denies smoking. Denies use of illicit drugs.     Past Medical History:   Diagnosis Date     Depression      H. pylori infection      Hepatitis B      Patient Active Problem List   Diagnosis     Trouble in sleeping     Anemia     Hepatitis B     Severe major depression with psychotic features     Hyperlipidemia     Vitamin D deficiency     Past Surgical History:   Procedure Laterality Date     LIPOMA RESECTION      left face     Family History   Problem Relation Age of Onset     No Medical Problems Mother      Social History     Social History     Marital status: Single     Spouse name: N/A     Number of children: N/A     Years of education: N/A     Occupational History     Not on file.     Social History Main Topics     Smoking status: Never Smoker     Smokeless tobacco: Not on file     Alcohol use No     Drug use: No     Sexual activity: No     Other Topics Concern     Not on file     Social History Narrative     No current outpatient prescriptions on file prior to visit.     No current facility-administered medications on file prior to visit.      Objective  Vitals:    09/28/17 1448   BP: 104/66   Pulse: 80   Resp: 20       General Appearance:  Alert, cooperative, no distress, appears stated age   Head:  Normocephalic, without obvious abnormality, atraumatic   Eyes:  PERRL, conjunctiva/corneas clear, EOM's intact   Ears:  Normal TM's and external ear canals, both ears   Nose: Nares normal, septum midline,mucosa normal, no drainage    Throat: Lips, mucosa, and tongue normal   Neck: Supple, symmetrical, trachea midline, no adenopathy;  thyroid: not enlarged, symmetric, no tenderness/mass/nodules; no carotid bruit or JVD   Back:   Symmetric, no curvature, ROM normal, no CVA tenderness   Lungs:   Clear to auscultation bilaterally, respirations unlabored   Heart:  Regular rate and rhythm, S1 and S2 normal, no murmur, rub, or gallop    Abdomen:   Soft, mild RUQ tenderness, bowel sounds active all four quadrants,  no masses, no guarding   Extremities: Extremities normal, atraumatic, no cyanosis or edema   Skin: Skin color, texture, turgor normal, no rashes or lesions   Lymph nodes: Cervical, supraclavicular nodes normal   Neurologic: Normal, CN 2-12 intact     Recent Results (from the past 240 hour(s))   Comprehensive Metabolic Panel   Result Value Ref Range    Sodium 138 136 - 145 mmol/L    Potassium 3.4 (L) 3.5 - 5.0 mmol/L    Chloride 103 98 - 107 mmol/L    CO2 25 22 - 31 mmol/L    Anion Gap, Calculation 10 5 - 18 mmol/L    Glucose 184 (H) 70 - 125 mg/dL    BUN 10 8 - 22 mg/dL    Creatinine 0.81 0.60 - 1.10 mg/dL    GFR MDRD Af Amer >60 >60 mL/min/1.73m2    GFR MDRD Non Af Amer >60 >60 mL/min/1.73m2    Bilirubin, Total 0.4 0.0 - 1.0 mg/dL    Calcium 9.0 8.5 - 10.5 mg/dL    Protein, Total 7.3 6.0 - 8.0 g/dL    Albumin 3.4 (L) 3.5 - 5.0 g/dL    Alkaline Phosphatase 56 45 - 120 U/L    AST 16 0 - 40 U/L    ALT 12 0 - 45 U/L   HM2(CBC w/o Differential)   Result Value Ref Range    WBC 7.9 4.0 - 11.0 thou/uL    RBC 4.08 3.80 - 5.40 mill/uL    Hemoglobin 11.6 (L) 12.0 - 16.0 g/dL    Hematocrit 37.0 35.0 - 47.0 %    MCV 91 80 - 100 fL    MCH 28.5 27.0 - 34.0 pg    MCHC 31.4 (L) 32.0 - 36.0 g/dL    RDW 11.6 11.0 - 14.5 %    Platelets 193 140 - 440 thou/uL    MPV 8.7 7.0 - 10.0 fL   Lipase   Result Value Ref Range    Lipase 165 (H) 0 - 52 U/L

## 2021-06-13 NOTE — PROGRESS NOTES
"S:  Silvino Caceres is a 40 y.o. female who comes to the clinic today for  1.  40 y.o. who is herre with complaints of fatigue.  Ongoing for many years.  It is intermittent.  This time she is having some nausea with it.  Especially when she eats.  She does have a history of h pylori.    She feels like she is going to vomit, but doesn't.  She denies any diarrhea.  She sometimes has constipation.  She denies any blood in her stools.  She drinks a lot of water.  She eats once or twice daily.  She doesn't drink any caffeine.  She mostly drinks water.    She denies any chance of pregnancy . She is not sexually active.  No dysuria.  No blood in urine.      There was covid in her home in November, but she didn't get it and tested negative at that time.    She often sleeps poorly.  She doesn't go outside to eat.    No dysphagia.    She has lost weight.    She stopped her psychotherapy in April.  She is not sure if she wants to go back at this time.    I reviewed the pertinent family, social, surgical, medical history.      O:  BP 98/58   Pulse 72   Temp 98.2  F (36.8  C) (Oral)   Resp 18   Ht 5' 0.75\" (1.543 m)   Wt 112 lb 12 oz (51.1 kg)   LMP 12/09/2020 (Exact Date)   BMI 21.48 kg/m    Gen: no acute distress  Neck:  Supple, no lad, no thyromegaly or nodules  Heart:  Regular rate and rhythm.  No m/r/g  Lungs: cta bilaterally, no wheezes or rhonchi.  Good air inspiration  Abdomen:  No masses or organomegaly, non tender.  Non distended.  Normal bowel sounds.    Psych:  Some suicidal ideation that is intermittent. She denies any plan for suicide.  She has hope for the future.  She looks forward to going to work.  She denies any homicidal ideation.    Extremities:  No edema.             Patient Active Problem List   Diagnosis     Insomnia, unspecified type     Anemia     Hepatitis B     Hyperlipidemia     Vitamin D deficiency     Severe episode of recurrent major depressive disorder, with psychotic features (H)     PTSD " (post-traumatic stress disorder)     Persistent depressive disorder with anxious distress, currently moderate     Chronic pain of both shoulders     Gastroesophageal reflux disease without esophagitis     Itching     Current Outpatient Medications on File Prior to Visit   Medication Sig Dispense Refill     cetirizine (ZYRTEC) 10 MG tablet Take 1 tablet (10 mg total) by mouth daily. 30 tablet 2     cholecalciferol, vitamin D3, (VITAMIN D3) 2,000 unit Tab Take 1 tablet (2,000 Units total) by mouth daily. 100 tablet 1     hydrocortisone (ANUSOL-HC) 2.5 % rectal cream Apply topically to affected area BID - no longer than 14 days 30 g 1     ketotifen (ZADITOR/ZYRTEC ITCHY EYES) 0.025 % (0.035 %) ophthalmic solution 1 drop in each eye 1-2 times a day, as needed. 10 mL 3     miconazole (MICATIN) 2 % cream Apply to affected area 2 times daily 15 g 1     [DISCONTINUED] acetaminophen (TYLENOL EXTRA STRENGTH) 500 MG tablet Take 1 tablet (500 mg total) by mouth every 6 (six) hours as needed for pain. 100 tablet 0     [DISCONTINUED] famotidine (PEPCID) 40 MG tablet Take 1 tablet (40 mg total) by mouth every evening. 30 tablet 2     No current facility-administered medications on file prior to visit.           Recent Results (from the past 48 hour(s))   Pregnancy, Urine    Collection Time: 12/18/20 10:31 AM   Result Value Ref Range    Pregnancy Test, Urine Negative Negative   HM1 (CBC with Diff)    Collection Time: 12/18/20 11:06 AM   Result Value Ref Range    WBC 7.5 4.0 - 11.0 thou/uL    RBC 4.11 3.80 - 5.40 mill/uL    Hemoglobin 12.3 12.0 - 16.0 g/dL    Hematocrit 38.0 35.0 - 47.0 %    MCV 93 80 - 100 fL    MCH 29.9 27.0 - 34.0 pg    MCHC 32.4 32.0 - 36.0 g/dL    RDW 13.6 11.0 - 14.5 %    Platelets 169 140 - 440 thou/uL    MPV 12.9 (H) 8.5 - 12.5 fL    Neutrophils % 70 50 - 70 %    Lymphocytes % 21 20 - 40 %    Monocytes % 7 2 - 10 %    Eosinophils % 2 0 - 6 %    Basophils % 1 0 - 2 %    Immature Granulocyte % 0 <=0 %     Neutrophils Absolute 5.2 2.0 - 7.7 thou/uL    Lymphocytes Absolute 1.6 0.8 - 4.4 thou/uL    Monocytes Absolute 0.5 0.0 - 0.9 thou/uL    Eosinophils Absolute 0.1 0.0 - 0.4 thou/uL    Basophils Absolute 0.1 0.0 - 0.2 thou/uL    Immature Granulocyte Absolute 0.0 <=0.0 thou/uL        No images are attached to the encounter or orders placed in the encounter.       Assessment/Plan:  1. Nausea  Will refill pepcid.    Eat 3 small meals daily.    Check h pylori.    If no improvement, will look at meds for major depression and ptsd.    - Pregnancy, Urine  - H. pylori Antigen, Stool(HPSAG); Future  - Thyroid Cascade    2. Fatigue, unspecified type    - H. pylori Antigen, Stool(HPSAG); Future  - Thyroid Cascade  - HM1(CBC and Differential)    3. Gastroesophageal reflux disease without esophagitis    - famotidine (PEPCID) 40 MG tablet; Take 1 tablet (40 mg total) by mouth every evening.  Dispense: 30 tablet; Refill: 2    4. Arthralgia, unspecified joint    - acetaminophen (TYLENOL EXTRA STRENGTH) 500 MG tablet; Take 1 tablet (500 mg total) by mouth every 6 (six) hours as needed for pain.  Dispense: 100 tablet; Refill: 0    5. Suicidal ideation  If any worsening, then will likely need to restart therapy and consider medications.    I talked with her about how some of her mood problems may be affecting her physically.  This may be causing some of her nausea and a lot of her fatigue.    6. Persistent depressive disorder with anxious distress, currently moderate        Eat 3 small meals daily.      The entire conversation today was conducted through the use of a professional .      Alva Tolliver   12/18/2020 10:38 AM

## 2021-06-14 NOTE — PROGRESS NOTES
Silvino Caceres is a 40 y.o. female here for multiple concerns.     ASSESSMENT/PLAN:   Silvino was seen today for depression, nausea and cold feet.    Diagnoses and all orders for this visit:    Decreased appetite  Severe episode of recurrent major depressive disorder, with psychotic features (H)  Patient endorses suicidal ideations, no plans, only when she is tired. She only gets 4 hours of sleep per night. Will start with lower dose remeron - she has been on 30mg in the past, but sedative effects better at low doses. If no improvement, will consider amitriptyline and/or prazosin for nightmares.   -     PHQ9 Depression Screen  -     mirtazapine (REMERON) 15 MG tablet; Take 1 tablet (15 mg total) by mouth at bedtime.    Chronic viral hepatitis B without delta agent and without coma (H)  No CMP checked in a while, will review chart and discuss labs with patient.     Labs reviewed from previous visit, all normal.     Return in about 1 month (around 2/18/2021) for Medication check.       ======================================================    SUBJECTIVE  Silvino Caceres is a 40 y.o. female here for multiple chronic symptoms    Today she is feeling tired.   Difficulty breathing.   Has a lot of gas and she feels nauseated.   Hands, palms, legs are cold.     She usually sees Dr. Rowe at [facility].   She is closer here so she wants to move here.      She used to see a therapist at [facility], but it's been since 3/2020.   Declines going back to therapy, but I recommended it anyway. She is reluctant.   She has nightmares, seeing soldiers killing [place] people.   She does have suicidal ideations when she is tired, no plans.   She has been on several medications per chart review, but none have worked.  She says she has never been on anything for sleep, depression, nightmares - not true.     She has cold hands and feet, worse in the morning, better at night. No pain, numbness, tingling - only cold. She tries to warm them up but has a  "hard time. Labs including HGB, TSH normal.     ROS  Complete 10 point review of systems negative except as noted above in HPI    Reviewed Past Medical History, Medications, Family History and Social History in Epic and up to date with no new changes.    OBJECTIVE  BP 94/60   Pulse 97   Temp 98.3  F (36.8  C) (Oral)   Resp 16   Ht 5' 0.75\" (1.543 m)   Wt 112 lb (50.8 kg)   LMP 01/05/2021 (Exact Date)   SpO2 98%   Breastfeeding No   BMI 21.34 kg/m       General: Cooperative, pleasant, in no acute distress  HEENT: PERRL, EOMI.  TM normal bilaterally.  Pharynx normal without erythema.  Tonsils normal without hypertrophy or exudates.  Neck: no lymphadenopathy, no masses  CV: RRR, normal S1/S2, no murmur, rubs, gallops  Resp: No respiratory distress. Clear to auscultation bilaterally. No wheezes, rales, rhonchi  Abd: Nontender, nondistended, bowel sounds present  Ext: radial/pedal pulses +2 bilaterally  MSK: Normal muscle tone  Neuro: CN II-XII intact  Skin: warm, well perfused. No   Psych: Suicidal ideations, no plans. No auditory or visual hallucinations.     LABS & IMAGES   Results for orders placed or performed in visit on 12/18/20   Pregnancy, Urine   Result Value Ref Range    Pregnancy Test, Urine Negative Negative   Thyroid Cascade   Result Value Ref Range    TSH 0.86 0.30 - 5.00 uIU/mL   HM1 (CBC with Diff)   Result Value Ref Range    WBC 7.5 4.0 - 11.0 thou/uL    RBC 4.11 3.80 - 5.40 mill/uL    Hemoglobin 12.3 12.0 - 16.0 g/dL    Hematocrit 38.0 35.0 - 47.0 %    MCV 93 80 - 100 fL    MCH 29.9 27.0 - 34.0 pg    MCHC 32.4 32.0 - 36.0 g/dL    RDW 13.6 11.0 - 14.5 %    Platelets 169 140 - 440 thou/uL    MPV 12.9 (H) 8.5 - 12.5 fL    Neutrophils % 70 50 - 70 %    Lymphocytes % 21 20 - 40 %    Monocytes % 7 2 - 10 %    Eosinophils % 2 0 - 6 %    Basophils % 1 0 - 2 %    Immature Granulocyte % 0 <=0 %    Neutrophils Absolute 5.2 2.0 - 7.7 thou/uL    Lymphocytes Absolute 1.6 0.8 - 4.4 thou/uL    Monocytes " Absolute 0.5 0.0 - 0.9 thou/uL    Eosinophils Absolute 0.1 0.0 - 0.4 thou/uL    Basophils Absolute 0.1 0.0 - 0.2 thou/uL    Immature Granulocyte Absolute 0.0 <=0.0 thou/uL         ======================================================  Spent 30 min face to face with patient with more the 50% spent in counseling, reviewing chart, and coordination of care and discussing problems listed above.     Visit was completed along with [place]     Options for treatment and follow-up care were reviewed with the patient. Silvino Collado Say and/or guardian was engaged and actively involved in the decision making process. Silvino Collado Say and/or guardian verbalized understanding of the options discussed and was satisfied with the final plan.      John Perales MD

## 2021-06-14 NOTE — PROGRESS NOTES
Outpatient Mental Health Treatment Plan    Name:  Silvino Caceres  :  1980  MRN:  [id number]    Treatment Plan:  Initial Treatment Plan  Intake/initial treatment plan date:  2017   Benefit and risks and alternatives have been discussed: Yes  Is this treatment appropriate with minimal intrusion/restrictions: Yes  Estimated duration of treatment:  Approximately 20 sessions.  Anticipated frequency of services:  Every 1-2 weeks  Necessity for frequency: This frequency is needed to establish therapeutic goals and for continuity of care in order to monitor progress.  Necessity for treatment: To address cognitive, behavioral, and/or emotional barriers in order to work toward goals and to improve quality of life.    Plan:      ? Depression    Goal:  Decrease average depression level from 4 to 1.   Strategies:    ?[x] Decrease social isolation     [x] Increase involvement in meaningful activities     ?[x] Discuss sleep hygiene     ?[x] Explore thoughts and expectations about self and others     ?[x] Process grief (loss of significant person, independence, role, etc.)     ?[x] Assess for suicide risk     ?[x] Implement physical activity routine (with physician approval)     [x] Consider introduction of bibliotherapy and/or videos     [x] Continue compliance with medical treatment plan (or explore barriers)       ?    ?Degree to which this is a problem: 4  Degree to which goal is met: 1  Date of Review: 2017-2017    Anger management  Goal:  Decrease anger episodes from 4 to 1.   Strategies: ? [x] Learn and practice relaxation techniques and other coping strategies  ? [x] Explore thoughts and expectations about self and others     ? [x] Identify and monitor triggers for anger     ? [x] YesExplore thoughts and expectations about self and others      ?-Learn and implement DBT skills?    Degree to which this is a problem: 4  Degree to which goal is met: 1  Date of Review: 2017-2017    ? Other:  "  Goal: Decrease PTSD episodes from 4 to 1   Strategies: Further process trauma history, Exposure therapy, Develop a timeline, Mindfulness/relaxation techniques, Experiential/Somatic approaches.  ?        Degree to which this is a problem: 4  Degree to which goal is met: 1  Date of Review: 11/28/2017-02/28/2017      *Patient reports a goal that \"all my sickness will go away.\" Therapist and patient will coordinate care with PCP as appropriate as well as address somatic symptoms.        Functional Impairment:   Personal: 4  Family: 3  Work: 2  Social:4    Diagnosis:   1. Severe episode of recurrent major depressive disorder, with psychotic features    2. PTSD (post-traumatic stress disorder)          WHODAS 2.0 12-item version: Total = 13 or 27%       Scores presented in qualifiers to represent level of disability.  MODERATE Problem (medium, fair, ...) 25-49%     H1= 30  H2= \"unsure\"  H3= \"some because cough and cold and tired.\"         Clinical assessments and measures completed:. SNEHAL-7, PHQ-9, CAGE-AID and PANSI     Strengths:The patient appeared resourceful and resilient.     Limitations: Patient appeared to be a poor historian, lacking insight regarding the impact of underlying emotional issues. Despite extensive prompting, the patient had difficulty engaging in the therapeutic process but displayed some willingness to gain trust in the therapist.     Cultural Considerations: The patient is a 37 year old [place] female who was born in [place]. She grew up in [place] and eventually lived in a refugee camp which she left at age 25. She moved to [place] in 2011 but first lived in [place] and [place] before finally relocating to [place] in 2016. The patient does not speak English and requires the assistance of an .    Persons responsible for this plan: Patient, Provider and Other: Coordinate with PCP and psychiatry as appropriate.            Psychotherapist Signature           Patient Signature: "              Guardian Signature             Provider: Performed and documented by BIMAL Arevalo   Date:  11/28/2017      This note was created with help of Dragon dictation software.  Grammatical / typing errors are not intentional and inherent to the software.

## 2021-06-14 NOTE — PROGRESS NOTES
Patient here today to initiate psychiatric care. States high depression and anxiety with thoughts of SI without plan. States sleeps about 3-4 hours with trouble falling asleep.   Mood-0  PHQ-8

## 2021-06-14 NOTE — PROGRESS NOTES
"  Mental Health Visit Note        11/07/2017  Start time: 2:00pm    Stop Time: 3:00pm   Session # 1 (with this provider)    Silvino Caceres is a 37 y.o. female is being seen today for    Chief Complaint   Patient presents with     MH Follow Up     Patient presented for follow up psychotherapy appointment to establish care with new psychotherapist and address symptoms of depression and PTSD.     New symptoms or complaints: Review of symptoms as this is patient's first appointment with this provider- establishing care.  Patient reports being \"sad\" and having \"hatred.\"    Functional Impairment:   Personal: 4  Family: 3  Work: 2  Social:4    Clinical assessment of mental status:   Grooming: clean  Attire: Appropriate- appropriate for weather and occasion.  Age: Appears Stated  Behavior Towards Examiner: Sullen and Guarded/Evasive  Motor Activity: Within normal   Eye Contact: Avoidant  Mood: Depressed  Affect: Constricted, Irritable, Flat, Depressed and Not congruent with speech- times of talking about feelings of hatred and smiling and chuckling about it.   Speech/Language: Soft and Monotone  Attention: Distractible  Concentration: Brief  Thought Process: Slow  Thought Content: Hallucinations: Visual and possible auditory and somatic  Delusions: none reported  Orientation: X 3  Memory: Impairment, Recent and Remote  Judgement: Impairment and Minimal  Estimated Intelligence: Below Average  Demonstrated Insight: Diminished  Fund of Knowledge: adequate    Suicidal/Homicidal Ideation present: Yes: Patient reports having suicidal ideations since she was a kid. Completed PANSI measure in session, which indicates patient is high risk. Patient does not report any past attempts of suicide. Patient does not report any active plans or means. Reviewed calling 911 or going to the nearest ER when feels like harm to self. Discussed creating a more comprehensive safety plan in future session.     Patient's impression of their current " "status: The patient presented for a follow-up session and was accompanied by an . She reports feeling sick with a cough/cold. She indicates a \"heart/chest pain\" and upon further clarification through the , it was determined that patient was referring to a feeling she has when she \"hates someone or something.\" She reports feeling \"sad.\" She denies any worries and states, \"just hatred, no worries.\" She reports her feelings of hatred is \"on and off all the time.\" She does not share who or what causes her hatred. She does not report any HI or plans to harm others. She reports difficulty falling asleep and staying asleep. She reports she takes medications for sleep as needed. 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 However, on the PANSI she denies any \"excitement\" about how she is doing at work. Patient lives with friends. She reports her living situation is \"sometimes happy, sometimes not\" because of her mood. She denies having any family in the area. She is single and doesn't have children. When asked what she was working on with previous therapist, she reports \"nothing.\"  She further explained, \"I don't think I can be happy. I've been through a lot.\" She reports feeling like it is \"impossible\" to change her feelings. She stated that \"people always get me in trouble.\" When therapist inquired about this statement, patient stated, \"I already hate so I don't want to talk about it.\" Patient reports having \"bad health\" and that \"this country is not what I expected.\" She has suicidal ideations, but does not report any plan or active means. She stated, \"I am here today because of my grandparents. I lost consciousness and they asked the spirits to keep me alive.\" Patient would not disclose any more information about that scenario. It is not clear if she was referring to a suicide attempt or something else that caused her to lose consciousness.     Therapist impression " of patients current state: This was patient's initial appointment with new psychotherapy provider at [facility]. Therefore, the session focused on establishing care with new provider and reviewing patient's symptoms and concerns. Therapist and patient reviewed consent and privacy policy. Patient reported understanding and signed document- sent to be scanned into EMR. The patient appeared flat, irritable, and depressed during today's session. She was guarded and sullen. She laughed and chuckled when talking about her feelings of hatred. Her eye contact was avoidant. She was chewing on something- possible Betel [employer]. Psychotherapist provided empathic listening as patient expressed how she has been through a lot. Therapist introduced the concept of CBT in regards to the ability to change the way the brain thinks- from negative to positive thoughts, but explained that it takes practice. Therapist completed PANSI and WHODAS in session with patient. Patient was extremely resistant and guarded. There were times the  stated she was having difficulty understanding her. It appears it will take longer to develop a trusting therapeutic relationship with therapist as patient was very guarded in her response and reports much anger. It will be important in future session to continue to provide empathic listening and unconditional positive regard in order to progress further in the therapy process. Therapist and patient reviewed potential goals for treatment plan such as managing depression and anger. When asked about an expectation for treatment, patient denied any expectations. Therapist believes patient could benefit from psychiatry services due to severity of depression symptoms. Therapist recommended psychotherapy sessions every 1-2 weeks. Patient reported very limited availability for appointments due to her work schedule and classes.     WHODAS 2.0 12-item version: Total = 13 or 27%      Scores presented in  "qualifiers to represent level of disability.  MODERATE Problem (medium, fair, ...) 25-49%    H1= 30  H2= \"unsure\"  H3= \"some because cough and cold and tired.\"       Type of psychotherapeutic technique provided: Client centered, Solution-focused and CBT and motivational interviewing    Progress toward short term goals:Progress as expected, Patient has not been to therapy for 3-4 months. Establishing care with new provider.     Review of long term goals: Explored goals for treatment plan.     Diagnosis:   1. Severe episode of recurrent major depressive disorder, with psychotic features    2. PTSD (post-traumatic stress disorder)        Plan and Follow up: Patient is scheduled to return on 11/14/2017.     Discharge Criteria/Planning: Client has chronic symptoms and ongoing therapy for maintenance stability recommended.    BIMAL Arevalo 11/7/2017    "

## 2021-06-14 NOTE — PROGRESS NOTES
Mental Health Visit Note        11/14/2017  Start time: 2:30pm    Stop Time: 2:50pm   Session # 2    Silvino Caceres is a 37 y.o. female is being seen today for    Chief Complaint   Patient presents with     MH Follow Up     Patient presented for follow up psychotherapy appointment to address depression, anxiety and anger concerns.     New symptoms or complaints: Continues to report anger.     Functional Impairment:   Personal: 4  Family: 3  Work: 2  Social:4    Clinical assessment of mental status:   Grooming: clean  Attire: Appropriate- appropriate for weather and occasion.  Age: Appears Stated  Behavior Towards Examiner: Sullen and Guarded/Evasive  Motor Activity: Within normal   Eye Contact: Avoidant  Mood: Depressed  Affect: Constricted, Irritable, Flat and Depressed  Speech/Language: Soft and Monotone  Attention: Distractible  Concentration: Brief  Thought Process: Slow  Thought Content: Hallucinations: Visual and possible auditory and somatic  Delusions: none reported  Orientation: X 3  Memory: Impairment, Recent and Remote  Judgement: Impairment and Minimal  Estimated Intelligence: Below Average  Demonstrated Insight: Diminished  Fund of Knowledge: adequate    Suicidal/Homicidal Ideation present: None Reported This Session Further reviewed safety plan today as well as encouraged the use of supports as family member was present in session today.     Patient's impression of their current status: The patient presented for a follow-up session and was accompanied by an  and family member. The family member stated she lives with patient and patient agreed to have family member present for session. Patient continues to identify feelings of anger. She reports she does not use any coping skills to manage anger. She stated that she doesn't talk to her family much about her feelings.     Therapist impression of patients current state: The session started late as the interpreting agency sent the  to  the incorrect clinic. Therefore, today's session was short as the  arrived 30 minutes late for the appointment. Therapist and patient reviewed safety plan for suicidal ideations. Therapist also encouraged patient to reach out to her family members for further support when experiencing feelings of hopelessness. Patient continues to be guarded and irritable in session. She is resistant to engage in the therapeutic process at this time. It will be important to take more time to establish the therapeutic relationship so that patient feels comfortable engaging in session and being more open.  Therapist noted that PCP has referred patient for psychiatry services for further medication management of psychotropic medications. Therapist assisted patient in getting connected to a specialty  to have psychiatry appointment scheduled. Patient continues to report anger, but appears to lack emotional regulation skills. Therapist and patient engaged in diaphragm breathing exercise in order to provide patient with a coping skill to implement when feelings of anger arise. She was resistant to engage at first, but did engage in the breathing exercise after her family member encouraged her. Patient notes concern that she won't think about using the exercise. Discussed further practice so it becomes second nature. Also discussed practicing for preventative measures such as in the morning, afternoon and evening. Patient to practice implementing breathing exercise outside of session. Patient would benefit from continued work on emotional regulation skills.    Type of psychotherapeutic technique provided: Insight oriented, Client centered and mindfulness/relaxation    Progress toward short term goals:Progress as expected, patient maintained follow up appointment with psychotherapist. Was willing to engage in breathing exercise with prompting.     Review of long term goals: Explored goals for treatment plan.      Diagnosis:   1. Severe episode of recurrent major depressive disorder, with psychotic features    2. PTSD (post-traumatic stress disorder)        Plan and Follow up: Patient is scheduled to return on 11/28/2017. Patient was scheduled for psychiatry services on 12/14/17 with Liat Aviles NP.    Discharge Criteria/Planning: Client has chronic symptoms and ongoing therapy for maintenance stability recommended.    BIMAL Arevalo 11/14/2017

## 2021-06-14 NOTE — PROGRESS NOTES
Date of Service:  2017    Name:  Silvino Caceres  :  1980  MRN:  [id number]    HPI:   Silvino Caceres is a 37 y.o. female with a history of MDD with psychotic feature presenting to the clinic today of the referral of her primary care provider for psychiatric medical management. Patient does not speak English, therefore,  services were used to address the complexities of language interpretation in this meeting  Per her statement-she was born in [place] and is located in the  .  She currently is working as a PCA for 3 hours a day.  Finances are tight but she tries to manage her money well.  She describes her growing up as good until she was in sixth grade and there was one they had to fluids and refugee comes.  She was unable to complete school because of these.  Currently her depression symptoms include feeling down lack of motivation, feeling sad most of the days.  She also endorses auditory and visual hallucinations.  Auditory hallucinations involved hearing voices that are sometimes distressful but not scary.  They not commanding nature they do not ask her to do anything harmful.  The visual hallucinations include shadows.  She also gets nightmares from flashbacks of high experience in the wall.  She does not want to further elaborate about this experience.  She denies suicidal homicidal ideations.  She denies adrianna or hypomania symptoms.  She denies delusions.  She denies paranoia.  She tells me she feels safe she does not appear to be in any apparent distress she verbally contracts for safety.  She denies side effects from her current medications.  She presented well and answered most of my questions but very briefly and superficially.  She was soft-spoken avoided eye contact although this could be normal and her culture.  She smiled often and was very polite.  She also presented with complaints of cold intolerance which I also noted in chart review that her primary care  provider is aware she has brought that up to  primary care before.  I will defer this issue to primary care.  She currently states that her medication is not managing her current symptoms and was looking for any options.  We discussed starting her on antipsychotic medications to address the psychosis.  I discussed neuroleptic medications benefits and side effects profile including metabolic side effects and the risk of tardive dyskinesia.  She endorsed understanding I answered all her questions and she signed neuroleptic consent form.  We shall be starting her on Seroquel 25 mg at bedtime to address the psychosis.  She does not want to stop taking the Remeron 30 mg that she is taking at bedtime.  She should be overly sedated we could decrease this dose of Seroquel to 12.5 on Remeron to 15 mg.  I would also discontinue Remeron and  find an alternative antidepressant for her.  For today the only changes in her medication with the addition of Seroquel 25 mg.  I will see her back in 6 weeks.  I encouraged her to continue with psychotherapy.  I also encouraged her to call it in between visits with any questions or concerns.              Psychiatric History:  Current psychiatrist: None   Current psychotherapist: None .  Current : Yes - Twice a months : Mariama Lentz   Diagnoses: MDD with psychotic features .  Hospitalizations: Denies   Suicide attempts: Denies.  Current medications: Remeron 30 mg at bedtime   Electroconvulsive therapy: Denies   Judicial commitments: Denies         Chemical use History:             Denies any use of any mood altering substances     Past Medical History:           Patient Active Problem List   Diagnosis     Trouble in sleeping     Anemia     Hepatitis B     Severe major depression with psychotic features     Hyperlipidemia     Vitamin D deficiency     Severe episode of recurrent major depressive disorder, with psychotic features     PTSD (post-traumatic stress disorder)         "  Past Medical History:   Diagnosis Date     Depression      H. pylori infection      Hepatitis B        Past Surgical History:   Procedure Laterality Date     LIPOMA RESECTION      left face        Family Psychiatric History:      Mental illness: Unknown   Addiction: Unknown   Suicide: Denies       Social History:       Marital Status : single   Number of children:  None   Current living circumstances: [facility] - home with roommates   Current sources of financial support: works as a PCA 3 hrs each day     Obstetric History:  Last menstrual period: Patient's last menstrual period was 11/19/2017.  Birth Control : \" No \"   Sexually Active : \" No \"      History:  Denied  service.    Access to weapons  Denies access to weapons.           Trauma & Abuse History:  Major accidents and injuries: Denies   Concussion or traumatic brain injury: Denies   Abuse: Denies     Spiritual History:  Sources of hope, meaning, comfort, strength, peace and love: \" Bible \"   Part of an organized Yazdanism: \" Mormon \"     Birth & Development History:  City and state of birth: [facility], [place]  Highest education achieved: 6th grade could not complete school because of war     Legal History:  Denies any current or past legal issues       [place] Prescription Monitoring Program:  No worrisome pharmacy activity.  Not indicated for this patient.    Medications:   These were reviewed.    Current Outpatient Prescriptions on File Prior to Visit   Medication Sig Dispense Refill     cholecalciferol, vitamin D3, (VITAMIN D3) 2,000 unit Tab Take 1 tablet (2,000 Units total) by mouth daily. 100 tablet 3     fluconazole (DIFLUCAN) 150 MG tablet Take 150 mg by mouth as needed.       mirtazapine (REMERON) 30 MG tablet Take 1 tablet (30 mg total) by mouth at bedtime. 30 tablet 5     No current facility-administered medications on file prior to visit.          Lab Results:   Personally reviewed and discussed with the " "patient    Lab Results   Component Value Date    WBC 7.9 09/28/2017    HGB 11.6 (L) 09/28/2017    HCT 37.0 09/28/2017     09/28/2017    CHOL 208 (H) 02/22/2017    TRIG 137 02/22/2017    HDL 37 (L) 02/22/2017    ALT 12 11/01/2017    AST 14 11/01/2017     09/28/2017    K 3.6 10/05/2017     09/28/2017    CREATININE 0.81 09/28/2017    BUN 10 09/28/2017    CO2 25 09/28/2017    TSH 0.61 02/22/2017     No results found for: PHENYTOIN, PHENOBARB, VALPROATE, CBMZ    Vital signs:    Vitals:    12/14/17 1223   BP: 118/79   Pulse: 94   Weight: 113 lb (51.3 kg)   Height: 5' 1\" (1.549 m)     Allergies:  Allergies   Allergen Reactions     Acetaminophen Dizziness         Associated Clinical Documents:       Notes reviewed in EPIC and [facility] including: medication reconciliation, progress notes, recent labs, PMH, and OSH records.    ROS:       10 point ROS was negative except for the items listed in HPI.  No Medication s/e's      MSE:      Alert & oriented x 3.   Appearance: Appears stated age, casually dressed.  Speech: Normal rate, rhythm and tone.  Gait: Normal.  Musculoskeletal: Normal strength, no abnormal movements.  Mood/Affect: Neutral.  Thought Process: Normal rate, logical.  Thought Content: No suicide or homicide ideation.  Associations: Intact, no delusions.  Perceptions: endorses occasional auditory hallucination, endorses visual hallucinations.   Memory: recent and remote memory intact.  Attention span and concentration: normal.  Language: Intact.  Fund of Knowledge: Normal.  Insight and Judgement: Adequate.    Clinical Outcome Measures:  1. PHQ-9: Total score : 8      Impression:           Severe episode of recurrent major depressive disorder, with psychotic features   PTSD (post-traumatic stress disorder)       Plan:         Patient and I reviewed diagnosis and treatment plan.   Reviewed risks/benefits of medication with patient.  Ongoing education given regarding diagnostic and treatment options with " adequate verbalization of understanding  Patient agrees with following recommendations:    1.  Start Seroquel 25 mg at bedtime-MDD with psychotic features  2-continue Remeron 30 mg at time-MDD  3-continue with weekly psychotherapy sessions  4-return to the clinic in 6 weeks call in between visits with any questions or concerns  5-neuroleptic consent form signed      Total Time:      60 Minutes spent on this visit with >50% time spent on  discussing and educating patient about diagnosis, treatment options, risks, benefits ,side effects of medications and instructions for follow up.  Time also spent on reviewing  EHR, documentation and entering orders.      This dictation was completed with speech recognition software and there may be unintended word substitutions.

## 2021-06-14 NOTE — PROGRESS NOTES
Correct pharmacy verified with patient and confirmed in snapshot? [x] yes []no    Charge captured ? [x] yes  [] no    Medications Phoned  to Pharmacy [] yes [x]no  Name of Pharmacist:  List Medications, including dose, quantity and instructions      Medication Prescriptions given to patient   [] yes  [x] no   List the name of the drug the prescription was written for.       Medications ordered this visit were e-scribed.  Verified by order class [x] yes  [] no  Seroquel 25 mg    Medication changes or discontinuations were communicated to patient's pharmacy: [] yes  [x] no    UA collected [] yes  [x] no    [place] Prescription Monitoring Program Reviewed? [x] yes  [] no    Referrals were made to:none      Future appointment was made: [x] yes  [] no    Dictation completed at time of chart check: [x] yes  [] no    I have checked the documentation for today s encounters and the above information has been reviewed and completed.

## 2021-06-14 NOTE — PROGRESS NOTES
"  Mental Health Visit Note    12/12/2017  Start time: 2:15pm    Stop Time: 3:00pm   Session # 4    Silvino Caceres is a 37 y.o. female is being seen today for    Chief Complaint   Patient presents with     MH Follow Up     Patient presented for follow up psychotherapy appointment to address symptoms of depression and PTSD     New symptoms or complaints: Reprots anger with herself. Depressed mood. Continues to have impaired sleep. Feeling of dizziness.  Transporation is a barrier.    Functional Impairment:   Personal: 4  Family: 3  Work: 2  Social:4    Clinical assessment of mental status:   Grooming: clean  Attire: Appropriate- appropriate for weather and occasion.  Age: Appears Stated  Behavior Towards Examiner: Cooperative, Sullen and Guarded/Evasive  Motor Activity: Within normal   Eye Contact: Avoidant  Mood: Depressed  Affect: Constricted, Irritable, Flat and Depressed  Speech/Language: Soft and Monotone  Attention: Distractible  Concentration: Brief  Thought Process: Slow  Thought Content: Hallucinations: Visual and possible auditory and somatic  Delusions: none reported  Orientation: X 3  Memory: Impairment, Recent and Remote  Judgement: Impairment and Minimal  Estimated Intelligence: Below Average  Demonstrated Insight: Diminished  Fund of Knowledge: adequate    Suicidal/Homicidal Ideation present: None Reported This Session     Patient's impression of their current status: The patient presented for a follow-up session and was accompanied by an . Patient expressed that she has \"a lot going on\" and is \"not so well lately.\" After much prompting, she expressed that she feels worse when it is cold. 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 She has not been getting out of the house or seeing the sun. She continues to struggle to sleep at night. However, she denies any napping during the day. She continues to report dizziness. Although she does not notice " "it when she is doing her PCA work such as cooking or cleaning. She states that she has \"no deloris\" in life. She struggles to recall a time that she felt deloris, but wonders if she did before her \"injuries\" which she said occurred in [place]. She expressed concern about her ability to get transportation to her psychiatry appointment this week.    Therapist impression of patients current state:  Patient presented for follow up therapy appointment with [facility]  present. She continues to presented with symptoms of depression. She avoids eye contact at times and tends to smile when she responds to questions, which is not congruent with her mood and not does not match what she describes in her response.  It is important to note that there were times throughout the session when the  had difficulty understanding patient. Therapist also struggles to follow patient at times and patient requires frequent prompting and questioning as she does not easily engage in reciprocal conversation. She is guarded at times when talking about her life. A significant time was spent discussing the upcoming psychiatry appointment as patient expressed hesitation. Therapist emphasized the importance of attending the appointment. Patient continues to present with severe symptoms and would benefit from the care of a psychiatrist for medication management. Acknowledged that patient reports she is \"not doing so well\" and that seeing the psychiatrist may also help with improving mood through better symptom management with the appropriate medications. In the end, patient stated she would talk to the person who gives her rides and it was decided that she will keep the appointment and not reschedule. Therapist continues to work on joining with patient and establishing the therapeutic relationship. Patient is resistant to many of the coping skills therapist encouraged in session. Therapist provided education on sleep hygiene. " "Therapist also discussed the importance of moving the body to get to increase the release of \"happy chemicals\" in the brain. Patient was resistant to any relaxation techniques in regards to the anger she experiences. Patient would benefit from the use of CBT as she reports a lot of anger toward herself. However, this is difficult to implement when patient is very guarded in her response.  She is not able to identify any activities she enjoys doing. Therapist encouraged further exploration of activities and possibly trying some new activities- will further discuss in future sessions. Therapist is wondering if patient would be eligible for medical rides- will see if this is something the clinic SW can look into. Patient is not connected to a clinic care guide and was not interested in the program when therapist inquired.     Type of psychotherapeutic technique provided: Insight oriented, Client centered and CBT    Progress toward short term goals:Progress as expected, patient maintained follow up appointment with psychotherapist. Patient continues to be guarded at times, but is increasing in engagement over time. Continues to endorse severe symptoms of depression. Emphasized the importance of attending psychiatry appointment this week.      Review of long term goals: Treatment Plan updated and Plan effective 11/28/17- 2/28/17.  Patient signed off on treatment plan today- sent to be scanned into EMR.    Diagnosis:   1. Severe episode of recurrent major depressive disorder, with psychotic features    2. PTSD (post-traumatic stress disorder)    R/O Somatic Symptom Disorder    Plan and Follow up: Patient is scheduled to return for follow up psychotherapy on 12/26/2017. Patient is scheduled for psychiatry services on 12/14/17 with Liat Aviles NP- reminded patient of appointment.    Discharge Criteria/Planning: Client has chronic symptoms and ongoing therapy for maintenance stability recommended.    BIMAL Arevalo " 12/12/2017

## 2021-06-14 NOTE — PROGRESS NOTES
"  Mental Health Visit Note    11/28/2017  Start time: 2:15pm    Stop Time: 3:00pm   Session # 3    Silvino Caceres is a 37 y.o. female is being seen today for    Chief Complaint   Patient presents with     MH Follow Up     Patient presented for follow up psychotherapy appointment to address symptoms of depression and PTSD.     New symptoms or complaints: continues to report anger/hatred. Reports feeling stressed in so many ways. Impaired sleep. Feeling of heaviness in body.    Functional Impairment:   Personal: 4  Family: 3  Work: 2  Social:4    Clinical assessment of mental status:   Grooming: clean  Attire: Appropriate- appropriate for weather and occasion.  Age: Appears Stated  Behavior Towards Examiner: Cooperative and Sullen  Motor Activity: Within normal   Eye Contact: Avoidant  Mood: Depressed  Affect: Constricted, Irritable, Flat and Depressed  Speech/Language: Soft and Monotone  Attention: Distractible  Concentration: Brief  Thought Process: Slow  Thought Content: Hallucinations: Visual and possible auditory and somatic  Delusions: none reported  Orientation: X 3  Memory: Impairment, Recent and Remote  Judgement: Impairment and Minimal  Estimated Intelligence: Below Average  Demonstrated Insight: Diminished  Fund of Knowledge: adequate    Suicidal/Homicidal Ideation present: None Reported This Session     Patient's impression of their current status: The patient presented for a follow-up session and was accompanied by an . She identifies feeling \"stressed in so many ways,\" but is unable to specify. She then elaborates more on feelings of \"hate,\" such as \"when someone doesn't do right to you.\" She states she has no family here and that the person who came with her at the last appointment was not actually a family member. She notes she not longer attends [facility], as that was just when she was initially getting resettled. Patient identifies a goal that \"all of my sickness will go away.\" She reports feeling " "cold in her hands and feet, dizzy sometimes, and tired almost daily. She reports she \"can't fall asleep\" and her medication doesn't help. She believes she sleep 2-3 hours per night. She denies sleeping during the day. She notes she often stays in bed because she is so dizzy she can't move. In regards to any anxiety, she states, \"I don't think anything. I feel so heavy. I don't want to get up. I feel heaviness every day, even in the day time- my whole body.\" She also expressed how it is \"hard at night\" because \"you don't have the answers, so you can't sleep.\" She is referring to \"how you feel in your heart.\" Her sleep also is affected by \"seeing things you don't like that are scary.\" She further shared that much of her \"hatred\" is towards [place]. She reports she was living in the city and moved to the [place] as the \"[place] people were not nice.\" She notes she came to the [place] to get rid of the hate for [place].      Therapist impression of patients current state:  Patient presented for follow up therapy appointment with [facility]  present. It is important to note that there were times throughout the session when the  had difficulty understanding patient. Therapist also struggles to follow patient at times and it requires rephrasing, prompting, and asking clarifying questions. Therapist and patient explored goals for treatment: depression, anger, PTSD. Patient's living situation is confusing as it is not clear who she lives with, if she stays at the same place, and if she feels supported. It appears patient is presenting with somatic symptoms that are likely associated with her diagnosis of depression. She notes feeling tired daily, dizzy, and a sense of heaviness in her whole body. Therapist provided education on mind-body connection. Patient completed PHQ-15 measure in session and scored 15, which indicates moderate-severe somatization. It is important to note that patient was more " cooperative and open in session today.    Type of psychotherapeutic technique provided: Insight oriented, Client centered, Solution-focused and mindfulness/relaxation    Progress toward short term goals:Progress as expected, patient maintained follow up appointment with psychotherapist. Patient was more receptive to engaging in reciprocal conversation today in session, which appears to show the development of a therapeutic relationship.      Review of long term goals: Explored goals for treatment plan. - will have patient sign treatment plan at future session.     Diagnosis:   1. Severe episode of recurrent major depressive disorder, with psychotic features    2. PTSD (post-traumatic stress disorder)    R/O Somatic Symptom Disorder    Plan and Follow up: Patient is scheduled to return for follow up psychotherapy on 12/12/2017. Patient was scheduled for psychiatry services on 12/14/17 with Liat Aviles NP.    Discharge Criteria/Planning: Client has chronic symptoms and ongoing therapy for maintenance stability recommended.    BIMAL Arevalo 11/28/2017

## 2021-06-15 PROBLEM — D64.9 ANEMIA: Status: ACTIVE | Noted: 2017-02-13

## 2021-06-15 PROBLEM — E55.9 VITAMIN D DEFICIENCY: Status: ACTIVE | Noted: 2017-03-14

## 2021-06-15 PROBLEM — E78.5 HYPERLIPIDEMIA: Status: ACTIVE | Noted: 2017-03-14

## 2021-06-15 PROBLEM — G47.00 INSOMNIA, UNSPECIFIED TYPE: Status: ACTIVE | Noted: 2017-01-20

## 2021-06-15 PROBLEM — B18.1 CHRONIC VIRAL HEPATITIS B WITHOUT DELTA AGENT AND WITHOUT COMA (H): Status: ACTIVE | Noted: 2017-02-13

## 2021-06-15 NOTE — PROGRESS NOTES
"Silvino Collado Say is a 41 y.o. female here for arm pain    ASSESSMENT/PLAN:   Silvino was seen today for follow-up.    Diagnoses and all orders for this visit:    Muscle strain  Patient refused PT, refused to see her therapist, doesn't seem to understand how to ask for what she wanted. Discussed that we could connect her with social work to help her if she wanted time off work or to discuss anything outside of medicine, she refused. We discussed treatment for her arm pain, she said she will consider it.   -     ibuprofen (ADVIL,MOTRIN) 600 MG tablet; Take 1 tablet (600 mg total) by mouth every 6 (six) hours as needed for pain.  -     diclofenac sodium (VOLTAREN) 1 % Gel; Apply 2 g topically 4 (four) times a day.    Seasonal allergic conjunctivitis  -     cetirizine (ZYRTEC) 10 MG tablet; Take 1 tablet (10 mg total) by mouth daily.      Unclear if patient understands what she wants/needs, but she refused any and all resources. She talked in circles, repeating \"my arm hurts\" and unable or unwilling to elaborate.     Return in about 1 month (around 3/18/2021) for arm pain, encourage PT/OT.       ======================================================    SUBJECTIVE  Silvino Collado Say is a 41 y.o. female here for follow up appetite.     Patient's appetite is better, she has gained almost 10lb.   She does not recall our conversation about her medications - she thinks the medication is for pain and only takes it when her arm hurts.     Patient is here today for \"hand pain.\" when I ask to see her hands, she says that her hands do not hurt - it's her arms. She holds her forearms and specifically her elbows - left worse than right. No inciting injury. She says it's the inside of her elbows that hurt - you can't make the arm hurt by touching it, but it hurts to touch.     We discussed a plan of using nsaids - topicl and/or oral. She says that she doesn't understand why we are discussing medications when her arm hurts.     Patient has a history " "of mental health issues, including PTSD and depression. She refuses to see her therapist again, did not give any reason, just repeated her statements. She does not want to go back. She is not on any medications at this time, does not want to be.     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 Again we discussed PT/OT and medications for symptoms. She says she does not want the medicaitons because \"my arm hurts.\" She works 2 times per week, she says not full days. She did not request time off work.     I asked the patient how I can help her, she said her arm hurts. We did this with 2 different interpreters.       ROS  Complete 10 point review of systems negative except as noted above in HPI    Reviewed Past Medical History, Medications, Family History and Social History in Epic and up to date with no new changes.    OBJECTIVE  BP 90/56   Pulse 74   Temp 97.9  F (36.6  C) (Oral)   Resp 16   Ht 5' 0.75\" (1.543 m)   Wt 122 lb (55.3 kg)   LMP 01/30/2021 (Exact Date)   SpO2 99%   Breastfeeding No   BMI 23.24 kg/m       General: Cooperative, pleasant, in no acute distress  HEENT: PERRL, EOMI.  TM normal bilaterally.  Pharynx normal without erythema.  Tonsils normal without hypertrophy or exudates.  Neck: no lymphadenopathy, no masses  CV: RRR, normal S1/S2, no murmur, rubs, gallops  Resp: No respiratory distress. Clear to auscultation bilaterally. No wheezes, rales, rhonchi  Abd: Nontender, nondistended, bowel sounds present  Ext: radial/pedal pulses +2 bilaterally  MSK: Normal muscle tone  Neuro: CN II-XII intact  Skin: warm, well perfused. No rashes  Psych: No suicidal or homicidal ideations, no self-harm.  Normal affect.    LABS & IMAGES   Results for orders placed or performed in visit on 12/18/20   Pregnancy, Urine   Result Value Ref Range    Pregnancy Test, Urine Negative Negative   Thyroid Cascade   Result Value Ref Range    TSH 0.86 0.30 - 5.00 " uIU/mL   HM1 (CBC with Diff)   Result Value Ref Range    WBC 7.5 4.0 - 11.0 thou/uL    RBC 4.11 3.80 - 5.40 mill/uL    Hemoglobin 12.3 12.0 - 16.0 g/dL    Hematocrit 38.0 35.0 - 47.0 %    MCV 93 80 - 100 fL    MCH 29.9 27.0 - 34.0 pg    MCHC 32.4 32.0 - 36.0 g/dL    RDW 13.6 11.0 - 14.5 %    Platelets 169 140 - 440 thou/uL    MPV 12.9 (H) 8.5 - 12.5 fL    Neutrophils % 70 50 - 70 %    Lymphocytes % 21 20 - 40 %    Monocytes % 7 2 - 10 %    Eosinophils % 2 0 - 6 %    Basophils % 1 0 - 2 %    Immature Granulocyte % 0 <=0 %    Neutrophils Absolute 5.2 2.0 - 7.7 thou/uL    Lymphocytes Absolute 1.6 0.8 - 4.4 thou/uL    Monocytes Absolute 0.5 0.0 - 0.9 thou/uL    Eosinophils Absolute 0.1 0.0 - 0.4 thou/uL    Basophils Absolute 0.1 0.0 - 0.2 thou/uL    Immature Granulocyte Absolute 0.0 <=0.0 thou/uL         ======================================================  Spent 45 min face to face with patient with more the 50% spent in counseling, reviewing chart, and coordination of care and discussing problems listed above.     Visit was completed along with 2 different [employer] interpreters    Options for treatment and follow-up care were reviewed with the patient. Silvino Collado Say and/or guardian was engaged and actively involved in the decision making process. Silvino Collado Say and/or guardian verbalized understanding of the options discussed and was satisfied with the final plan.      John Perales MD

## 2021-06-15 NOTE — PROGRESS NOTES
Assessment/plan  1. Vaginitis  Wet prep reviewed.   Treated for yeast vaginitis.   Discussed appropriate use of medication.   Will follow up if no change or recurrence of symptoms.   - Wet Prep, Vaginal  - Chlamydia trachomatis & Neisseria gonorrhoeae, Amplified Detection        Subjective  [age] year old female here for concerns of vaginal itching, white discharge. She thinks this is present all the time, especially around the time of her menses.   Was treated for yeast infection several months ago. She thinks she completed that medication, her symptoms improved.   She denies abnormal vaginal bleeding. Pap smear is up to date. She denies pelvic pain. She denies abnormal vaginal odor. She has not tried any medication for this at home.       ROS: 12 systems reviewed, all negative except for what is mentioned in HPI.     Past Medical History:   Diagnosis Date     Depression      H. pylori infection      Hepatitis B      Patient Active Problem List   Diagnosis     Trouble in sleeping     Anemia     Hepatitis B     Severe major depression with psychotic features     Hyperlipidemia     Vitamin D deficiency     Severe episode of recurrent major depressive disorder, with psychotic features     PTSD (post-traumatic stress disorder)     Past Surgical History:   Procedure Laterality Date     LIPOMA RESECTION      left face     Family History   Problem Relation Age of Onset     No Medical Problems Mother      Social History     Social History     Marital status: Single     Spouse name: N/A     Number of children: N/A     Years of education: N/A     Occupational History     Not on file.     Social History Main Topics     Smoking status: Never Smoker     Smokeless tobacco: Current User      Comment: betle nut     Alcohol use No     Drug use: No     Sexual activity: No     Other Topics Concern     Not on file     Social History Narrative     Current Outpatient Prescriptions on File Prior to Visit   Medication Sig Dispense  Refill     cholecalciferol, vitamin D3, (VITAMIN D3) 2,000 unit Tab Take 1 tablet (2,000 Units total) by mouth daily. 100 tablet 3     mirtazapine (REMERON) 30 MG tablet Take 1 tablet (30 mg total) by mouth at bedtime. 30 tablet 5     QUEtiapine (SEROQUEL) 25 MG tablet Take 1 tablet (25 mg total) by mouth at bedtime. 30 tablet 1     No current facility-administered medications on file prior to visit.      Objective  Vitals:    12/26/17 1328   BP: 102/74   Pulse: 76   Resp: 20       General Appearance:  Alert, cooperative, no distress, appears stated age   Head:  Normocephalic, without obvious abnormality, atraumatic   Eyes:  PERRL, conjunctiva/corneas clear, EOM's intact   Throat: Lips, mucosa, and tongue normal   Neck: Supple   Abdomen:   Soft, non-tender, bowel sounds active all four quadrants,  no masses, no organomegaly   Genitalia: Normally developed genitalia with no external lesions or eruptions.  Vagina and cervix show no lesions, inflammation, discharge or tenderness.  No cystocele.  Uterus normal size and position.  No adnexal mass or tenderness.     Recent Results (from the past 240 hour(s))   Wet Prep, Vaginal    Collection Time: 12/26/17  1:54 PM   Result Value Ref Range    Yeast Result Yeast Seen (!) No yeast seen    Trichomonas No Trichomonas seen No Trichomonas seen    Clue Cells, Wet Prep No Clue cells seen No Clue cells seen   Chlamydia trachomatis & Neisseria gonorrhoeae, Amplified Detection    Collection Time: 12/26/17  1:54 PM   Result Value Ref Range    Chlamydia trachomatis, Amplified Detection Negative Negative    Neisseria gonorrhoeae, Amplified Detection Negative Negative

## 2021-06-15 NOTE — PROGRESS NOTES
"  Mental Health Visit Note    12/26/2017  Start time: 2:10pm    Stop Time: 3:00pm   Session # 5    Silvino Caceres is a 37 y.o. female is being seen today for    Chief Complaint   Patient presents with     MH Follow Up     Patient presented for follow up psychotherapy appointment to address symptoms of depression and PTSD     New symptoms or complaints: I never feel happy. No friends.    Functional Impairment:   Personal: 4  Family: 3  Work: 2  Social:4    Clinical assessment of mental status:   Grooming: clean  Attire: Appropriate- appropriate for weather and occasion.  Age: Appears Stated  Behavior Towards Examiner: Cooperative and Guarded/Evasive at times  Motor Activity: Within normal   Eye Contact: Avoidant  Mood: Depressed  Affect: Constricted, Flat and Depressed  Speech/Language: Soft and Monotone  Attention: Distractible  Concentration: Brief  Thought Process: Slow  Thought Content: Hallucinations: Visual and possible auditory and somatic  Delusions: none reported  Orientation: X 3  Memory: Impairment, Recent and Remote  Judgement: Impairment and Minimal  Estimated Intelligence: Below Average  Demonstrated Insight: Diminished  Fund of Knowledge: adequate    Suicidal/Homicidal Ideation present: None Reported This Session     Patient's impression of their current status: The patient presented for a follow-up session and was accompanied by a [facility] . Patient and therapist reviewed recent psychiatry appointment. Patient reported she was prescribed a new medication, which she started the day of her appointment. Per review of EMR, the medication was Seroquel 25mg. Patient reports that it helps her sometimes by making it easier to fall asleep. She reports she is staying asleep through the night and getting 8-9 hours of sleep. Patient denies feeling over tired. 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 She " identifies enjoying her work such as cooking and cleaning. She attends [employer] weekly and reports she enjoys it.  She does not engage in activities with others besides her PCA work. She denies having any friends, even when she lived back home. She mostly spent time with family back home. She noted that many live in the [place] now, but she does not have any contact with them. She identifies that war has changed things in her life. She does not have a desire to live back home in [facility] as she feels there are too many [place] soldiers and she continues to identify feelings of anger towards the [place]. Patient reports she has been trying diaphragm breathing exercises to help manage feelings of anger.     Therapist impression of patients current state:  Patient presented for follow up therapy appointment with [facility]  (Tyler SOLIS) present. She continues to presented with symptoms of depression. Patient's sleep has greatly improved since starting Seroquel is she is sleeping for longer periods of times. Therapist provided positive reinforcement for patient working as a PCA. Further explained how work provides purpose and meaning in life and can help improve mood. Therapist encouraged engagement in other meaningful activities. Time was spent exploring other activities. Patient identified the following: [employer], listening to music, relaxing bath/shower, gratitude statement. Therapist encouraged patient to identify 2 things she was grateful for. Patient was able to identify her work and a warm home to live in. Encouraged daily practice of gratitude and provided education around the concept of CBT and changing the way we think about things to identify more positive. Patient appears to lack insight into her cognitions as she denies thinking anything during the day. However, it is likely that she has negative thinking as she denies ever feeling happy. Therapist will continue to explore thoughts and implement CBT concepts  to patient in future session.     Type of psychotherapeutic technique provided: Insight oriented, Client centered and CBT    Progress toward short term goals:Progress as expected, patient maintained follow up appointment with psychotherapist. Patient attended psychiatry appointment and started taking new medications. She reports she has been practicing relaxation breathing.     Review of long term goals: Plan effective 11/28/17- 2/28/17.     Diagnosis:   1. Severe episode of recurrent major depressive disorder, with psychotic features    2. PTSD (post-traumatic stress disorder)    R/O Somatic Symptom Disorder    Plan and Follow up: Patient is scheduled to return for follow up psychotherapy on 1/16/18.    Discharge Criteria/Planning: Client has chronic symptoms and ongoing therapy for maintenance stability recommended.    BIMAL Arevalo 12/26/2017

## 2021-06-15 NOTE — PROGRESS NOTES
Mental Health Visit Note    1/29/2018  Start time: 3:00pm    Stop Time: 3:55pm   Session #2    Silvino Caceres is a 37 y.o. female is being seen today for    Chief Complaint   Patient presents with     MH Follow Up     Patient presented for follow up psychotherapy appointment to address symptoms of depression and PTSD.      New symptoms or complaints: Tired. Lacking sleep.     Functional Impairment:   Personal: 4  Family: 3  Work: 2  Social:4    Clinical assessment of mental status:   Grooming: clean  Attire: Appropriate- appropriate for weather and occasion.  Age: Appears Stated  Behavior Towards Examiner: Cooperative and Guarded/Evasive at times  Motor Activity: Within normal   Eye Contact: Avoidant  Mood: Depressed  Affect: Constricted, Flat and Depressed  Speech/Language: Soft and Monotone  Attention: Distractible  Concentration: Brief  Thought Process: Slow  Thought Content: Hallucinations: Visual and possible auditory   Delusions: none reported  Orientation: X 3  Memory: Impairment, Recent and Remote  Judgement: Impairment and Minimal  Estimated Intelligence: Below Average  Demonstrated Insight: Diminished  Fund of Knowledge: adequate    Suicidal/Homicidal Ideation present: None Reported This Session     Patient's impression of their current status: Patient reports feeling tired. She has not been sleeping well. She has an auntie who lives with her who has been in the hospital. Patient has been spending time with the aunt in the hospital and helping care for her. She shared more about her sleep habits and concerns.     Therapist impression of patients current state:  Patient presented for follow up therapy appointment with [facility] [place], operated by [facility] . Patient appeared depressed with flat affect. Patient attended her psychiatrist appointment- reviewed together. No medication changes made. It does not appear she is as open about her depression symptoms at those appointments. Patient was receptive to learning more about sleep  hygiene. Patient does have some positive sleep habits that she implements already and explored willingness to establish more of a routine. She is already knowledgeable about the importance of avoiding caffeine. She does not feel that relaxation exercises such as deep breathing are helpful- she reports she has been trying that for about 9-10 years. We have tried deep breathing exercises in session before and patient was not very engaged. Patient plans to work on associated her bed to sleep only.     Type of psychotherapeutic technique provided: Insight oriented, Client centered and CBT, sleep hygiene    Progress toward short term goals:Progress as expected, attended follow up psychiatry appointment. Taking medications as prescribed. Continues to work.      Review of long term goals: Not done at today's visit Effective 11/28/18- 2/28/18.     Diagnosis:   1. PTSD (post-traumatic stress disorder)    2. Severe episode of recurrent major depressive disorder, with psychotic features    R/O Somatic Symptom Disorder    Plan and Follow up: Patient is scheduled to return for follow up psychotherapy on 2/12/18.     Discharge Criteria/Planning: Client has chronic symptoms and ongoing therapy for maintenance stability recommended.    BIMAL Arevalo 1/29/2018

## 2021-06-15 NOTE — PROGRESS NOTES
"Psychiatric  Progress Note  Date of visit:1/24/2018           Discussion of Care and Treatment Recommendations:   This is a 37 y.o. female with history of MDD with psychotic feature presenting to the clinic today .Patient does not speak English, therefore,  services were used to address the complexities of language interpretation in this meeting      Last visit 12/4/17.  Recommendation at last visit .  1.  Start Seroquel 25 mg at bedtime-MDD with psychotic features  2-continue Remeron 30 mg at time-MDD  3-continue with weekly psychotherapy sessions  4-return to the clinic in 6 weeks call in between visits with any questions or concerns  5-neuroleptic consent form signed     Patient and I reviewed diagnosis and treatment plan and patient agrees with following recommendations:  Ongoing education given regarding diagnostic and treatment options with adequate verbalization of understanding.  Plan   1.Continue  Seroquel 25 mg at bedtime-MDD with psychotic feature,  Remeron 30 mg at time-MDD  2 -continue with weekly psychotherapy sessions  4-Return to the clinic in 6 weeks call in between visits with any questions or concerns              Diagnoses:     Severe episode of recurrent major depressive disorder, with psychotic features     PTSD (post-traumatic stress disorder    Patient Active Problem List   Diagnosis     Trouble in sleeping     Anemia     Hepatitis B     Severe major depression with psychotic features     Hyperlipidemia     Vitamin D deficiency     Severe episode of recurrent major depressive disorder, with psychotic features     PTSD (post-traumatic stress disorder)             Chief Complaint / Subjective:    Chief complaint: \"I am doing well\"     History of Present Illness:   Patient statement-she has been taking her medications and reports no side effects.  She tells me that she is doing well has had no troubles with sleeping or appetite denies any hallucinations or delusions paranoia " "denies adrianna or hypomania symptoms denies suicidal homicidal ideation she tells me she feels safe and verbally contracts for safety.  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  She brought 2 bottles of medications one for Seroquel return him to 1 for vitamin D but not Remeron.  When I inquired she told me she is only been taking the 2 that she brought.  She also let me that she probably did not  the Remeron and she promises to go and do that either today or tomorrow.  I renewed her Seroquel prescription also on the Remeron prescription and reminded her that she needs to go pick this up and take them both.  She was agreeable to doing so.  She does not want any medication changes for now she tells me she is doing well I will have her return to the clinic in 6 weeks for follow-up appointment meanwhile encourage her to call in between visits with any questions or concerns.    I did ask her how she takes the medication and she was able to tell me the right way to take them so I am not concerned that she is not taking her medications as prescribed but she may not have picked up her prescriptions on Remeron I did remind her that she needs to take 3 pills that his vitamin D which is prescribed by her primary the Seroquel in the Remeron she endorsed understanding of that and stated \"that sounds right\".  Mental Status Examination:   General: Adequate hygiene, cooperative  Speech: Normal in rate and tone  Language: Intact  Thought process: Coherent  Thought content:                           Auditory hallucinations- absent                           Visual Hallucinations - Absent                           Delusions Absent                           Loose Associations:  No                          Suicidal thoughts: Absent                          Homicidal thoughts: Absent                       Affect: Neutral  Mood: Neutral   Intellectual functioning: Within normal " "limits  Memory: Within normal limits  Fund of knowledge: Average  Attention and concentration: Within normal limits  Gait: Steady  Psychomotor activity: Calm, no agitation  Muscles: No atrophy, no abnormal movements  InSight and judgment: Fair    Medication changes:See Above   Medication adherence: compliant  Medication side effects: absent  Information about medications: Side effects, benefits and alternative treatments discussed and patient agrees with capacity to do so.    Psychotherapy: Supportive therapy day-to-day living    Education: Diet, exercise, abstinence from drugs and alcohol, patient will not drive if sedated and medications or  under influence of any substance    Lab Results:   Personally reviewed and discussed with the patient    Lab Results   Component Value Date    WBC 7.9 09/28/2017    HGB 11.6 (L) 09/28/2017    HCT 37.0 09/28/2017     09/28/2017    CHOL 208 (H) 02/22/2017    TRIG 137 02/22/2017    HDL 37 (L) 02/22/2017    ALT 12 11/01/2017    AST 14 11/01/2017     09/28/2017    K 3.6 10/05/2017     09/28/2017    CREATININE 0.81 09/28/2017    BUN 10 09/28/2017    CO2 25 09/28/2017    TSH 0.61 02/22/2017       Vital signs:  Vitals:    01/24/18 1428   BP: 100/64   Patient Site: Right Arm   Patient Position: Sitting   Cuff Size: Adult Regular   Pulse: 74   Temp: 98.4  F (36.9  C)   TempSrc: Oral   Weight: 116 lb (52.6 kg)   Height: 5' 1\" (1.549 m)       Allergies: Acetaminophen         Medications:     Current Outpatient Prescriptions on File Prior to Visit   Medication Sig Dispense Refill     cholecalciferol, vitamin D3, (VITAMIN D3) 2,000 unit Tab Take 1 tablet (2,000 Units total) by mouth daily. 100 tablet 3     miconazole (MICATIN) 2 % cream Apply to affected area 2 times daily 15 g 1     mirtazapine (REMERON) 30 MG tablet Take 1 tablet (30 mg total) by mouth at bedtime. 30 tablet 5     QUEtiapine (SEROQUEL) 25 MG tablet Take 1 tablet (25 mg total) by mouth at bedtime. 30 " tablet 1     No current facility-administered medications on file prior to visit.           Review of Systems:    Otherwise reminder of review of systems is negative    Coordination of Care:   More than 25 minutes spent on this visit  with more than 50% of time spent on coordination of care including: Educating patient about diagnosis, prognosis, side effects and benefits of medications, diet, exercise.  Time also spent on entering orders and preparing documentation for the visit.Time also spent providing supportive therapy regarding above issues.    This note was created using a dictation system. All typing errors or contextual distortion is unintentional and software inherent.

## 2021-06-15 NOTE — PROGRESS NOTES
"  Mental Health Visit Note    1/16/2018  Start time: 2:15pm    Stop Time: 3:10pm   Session # 6    Silvino Caceres is a 37 y.o. female is being seen today for    Chief Complaint   Patient presents with     MH Follow Up     Patient presented for follow up psychotherapy appointment to address PTSD, depression and somatic symptoms.     New symptoms or complaints: \"I feel heavy. I don't have happy.\" Dizziness. Pain.     Functional Impairment:   Personal: 4  Family: 3  Work: 2  Social:4    Clinical assessment of mental status:   Grooming: clean  Attire: Appropriate- appropriate for weather and occasion.  Age: Appears Stated  Behavior Towards Examiner: Cooperative and Guarded/Evasive at times  Motor Activity: Within normal   Eye Contact: Avoidant  Mood: Depressed  Affect: Constricted, Flat and Depressed  Speech/Language: Soft and Monotone  Attention: Distractible  Concentration: Brief  Thought Process: Slow  Thought Content: Hallucinations: Visual and possible auditory   Delusions: none reported  Orientation: X 3  Memory: Impairment, Recent and Remote  Judgement: Impairment and Minimal  Estimated Intelligence: Below Average  Demonstrated Insight: Diminished  Fund of Knowledge: adequate    Suicidal/Homicidal Ideation present: Yes: \"sometimes I feel I want to die.\" Patient denies any plans or active means to harm herself.  Patient contracted for safety- reviewed crisis plan.      Patient's impression of their current status: Patient reports feeling \"heavy.\" She explained that she doesn't want to move, but makes herself mood. She said that she doesn't have any happiness. Patient shared more about her experiences in [facility]. She reports that she thought she would be better when she came to the [place], but she feels \"heavy again.\" She further explained that she feels it all started when she was given the wrong medication in [facility]. She feels it changed her. She reports experiencing burning in her heart, warm body, and " "inability to speak and get help. She was taken to the hospital. She finds herself asking, \"why did this happen to me?\" She reports being treated well in the hospital. However, she feels the illness inside of her body didn't go away. She reports dizziness and pain- everywhere. She reports feeling better once when her grandmother did a traditional treatment. She shared more about how her grandmother and grandfather raised her. She expressed a desire to go back home to visit family, but noted financial constraints.     Therapist impression of patients current state:  Patient presented for follow up therapy appointment with [facility] . Patient appeared depressed with flat affect. Patient shared more about her trauma narrative and family. Therapist inquired further about the \"illness\" inside patient's body. She struggled to be more specific, but then stated, \"someitmes I feel I want to die.\" It appears the sickness is her body is likely her mental health conditions, such as depression and PTSD, that she is referring to. She reports feeling heavy, dizzy, and pain. These somatic complaints are likely related to her mental health diagnoses. Time was spent discussing and educating patient on the mind-body connection.  Patient continues to feel depressed and lacks feelings of happiness. She denies any plans or active means to harm herself and contracted for safety. Therapist and patient reviewed crisis plan. Patient would benefit from further work on her trauma history and implementing CBT skills to help manage symptoms of depression. Therapist also encouraged patient to inform her psychiatrist that she does not have any improvements. It does not seem that her anti-depressant medication is effective at this time. I encouraged further discussion about this with the psychiatrist.  Therapist provided positive reinforcement for patient moving and doing things in the day even when she doesn't feel like it. Therapist and " patient also utilized CBT skills and discussed the concept of changing the way we think to help improve mood. She has not been engaging in the gratitude statements since last session as was previously encouraged.     Type of psychotherapeutic technique provided: Insight oriented, Client centered and CBT, trauma narrative exposure.    Progress toward short term goals:Progress as expected, Progress as expected, she has been taking a daily bath and also listening to music at times, which is an activity she enjoys. Taking medications. Hasn't done any gratitude statements since last session.     Review of long term goals: Not done at today's visit Effective 11/28/18- 2/28/18.     Diagnosis:   1. Severe episode of recurrent major depressive disorder, with psychotic features    2. PTSD (post-traumatic stress disorder)    R/O Somatic Symptom Disorder    Plan and Follow up: Patient is scheduled to return for follow up psychotherapy on 1/29/18. Reminded patient of upcoming psychiatry appointment.     Discharge Criteria/Planning: Client has chronic symptoms and ongoing therapy for maintenance stability recommended.    BIMAL Arvealo 1/16/2018

## 2021-06-15 NOTE — PROGRESS NOTES
Pt presented for appointment on time but outside agency failed to notify us that [facility]  was not available.  HE provided  in house  Way. Pt say her sleep fluctuates, client forgets to take her Seroquel 1-2 a week, does not take Remeron for  Unknown reason.     Correct pharmacy verified with patient and confirmed in snapshot? [x] yes []no    Charge captured ? [] yes  [x] no    Medications Phoned  to Pharmacy [] yes [x]no  Name of Pharmacist:  List Medications, including dose, quantity and instructions      Medication Prescriptions given to patient   [x] yes  [] no   List the name of the drug the prescription was written for.       Medications ordered this visit were e-scribed.  Verified by order class [x] yes  [] no  Remeron and Seroquel  Medication changes or discontinuations were communicated to patient's pharmacy: [] yes  [x] no    UA collected [] yes  [x] no    [place] Prescription Monitoring Program Reviewed? [] yes  [x] no    Referrals were made to:   none    Future appointment was made: [x] yes  [] no   3/8/18  Dictation completed at time of chart check: [] yes  [x] no    I have checked the documentation for today s encounters and the above information has been reviewed and completed.

## 2021-06-16 PROBLEM — F43.10 PTSD (POST-TRAUMATIC STRESS DISORDER): Status: ACTIVE | Noted: 2017-11-28

## 2021-06-16 PROBLEM — F33.3 SEVERE EPISODE OF RECURRENT MAJOR DEPRESSIVE DISORDER, WITH PSYCHOTIC FEATURES (H): Status: ACTIVE | Noted: 2017-11-28

## 2021-06-16 PROBLEM — L29.9 ITCHING: Status: ACTIVE | Noted: 2019-12-06

## 2021-06-16 PROBLEM — M25.512 CHRONIC PAIN OF BOTH SHOULDERS: Status: ACTIVE | Noted: 2019-11-27

## 2021-06-16 PROBLEM — F34.1 PERSISTENT DEPRESSIVE DISORDER WITH ANXIOUS DISTRESS, CURRENTLY MODERATE: Status: ACTIVE | Noted: 2019-05-17

## 2021-06-16 PROBLEM — G89.29 CHRONIC PAIN OF BOTH SHOULDERS: Status: ACTIVE | Noted: 2019-11-27

## 2021-06-16 PROBLEM — K21.9 GASTROESOPHAGEAL REFLUX DISEASE WITHOUT ESOPHAGITIS: Status: ACTIVE | Noted: 2019-11-27

## 2021-06-16 PROBLEM — M25.511 CHRONIC PAIN OF BOTH SHOULDERS: Status: ACTIVE | Noted: 2019-11-27

## 2021-06-16 NOTE — PROGRESS NOTES
"  Mental Health Visit Note    2/12/2018  Start time: 3:00pm    Stop Time: 3:55pm   Session #3    Silvino Caceres is a 38 y.o. female is being seen today for    Chief Complaint   Patient presents with     MH Follow Up     Patient presented for follow up psychotherapy appointment to address symptoms of PTSD and depression.      New symptoms or complaints: Body pain. Not sleeping well. Stopped psychotropic medications. Tired.    Functional Impairment:   Personal: 4  Family: 3  Work: 2  Social:4    Clinical assessment of mental status:   Grooming: Well groomed  Attire: Appropriate- appropriate for weather and occasion.  Age: Appears Stated  Behavior Towards Examiner: Cooperative and Guarded/Evasive at times  Motor Activity: Within normal   Eye Contact: Avoidant  Mood: Depressed  Affect: Constricted, Flat and Depressed  Speech/Language: Soft and Monotone  Attention: Distractible  Concentration: Brief  Thought Process: Slow  Thought Content: Hallucinations: Visual and possible auditory   Delusions: none reported  Orientation: X 3  Memory: Impairment, Recent and Remote  Judgement: Impairment and Minimal  Estimated Intelligence: Below Average  Demonstrated Insight: Diminished  Fund of Knowledge: adequate    Suicidal/Homicidal Ideation present: None Reported This Session     Patient's impression of their current status: Patient reports she is doing \"pretty good.\" She is tired and wonders if it is a side effect of the medication. She felt her body was getting puffy all over. She also felt numbness on her whole body. Therefore, she stopped taking her medications (Remeron & Seroquel) last week. She feels she had these same feelings before when she was just on Remeron. She reports she never told the psychiatrist about these concerns. She no longer feels puffy or numb since she stopped taking the medications. She reports pain in her side, body and not sleeping well. She is not sure why she doesn't sleep well- she feels tired, but " "can't sleep. She reports thinking about sleep and asking, \"Why am I not asleep yet?\" She engages in clock watching. She reads the Bible before bed and tries breathing techniques- she does not find them helpful. She has nightmares 1-2x/week. She denies any worries. She eats 2x/day (rice, meat, veggies). For physical activity, she walks outside when the weather is nice- she denies this helps her sleep or mood. She has a fear about feeling dizzy in the morning if she doesn't sleep. She has forgetfulness and is easily lost when she leaves her home- mostly location/direction based such as in relation to the house. Denies auditory hallucinations. Reports visual hallucinations of people abusing each other. These occur at night when she is in bed. It is people from the refugee camp in [place] that are present in her visual hallucinations.     Therapist impression of patients current state:  Patient presented for follow up therapy appointment with [facility] . Patient appeared depressed with flat affect. She continues to struggle with implementing sleep hygiene skills- provided further education. Also provided education on talking to her doctor/psychiatrist before stopping medications.  Noted the importance of informing doctors when concerns arise with medications. She denies any changes in her mood since stopping the medications. She continues to have impaired sleep, nightmares, visual hallucinations and symptoms of depression. She lacks insight into how her visual hallucinations could be affecting her sleep. She is very resistant to medications and other coping skills/interventions to help manage symptoms. She feels that things are not helping as she does not notice any improvement. It is unclear how frequently and effectively she is implementing some of these skills. For example, she says that physical activity, such as walking doesn't help her sleep or mood. It is likely she does not engage in this frequently during " the winter months, but she still denies it is helpful. Similarly, she has not only been using the bed for sleep as she reports laying in it during the day for rest. Therapist encouraged patient to follow up with psychiatrist about her concerns with the medications and the fact that she stopped taking them.    Type of psychotherapeutic technique provided: Insight oriented, Client centered and CBT, psychoeducation, sleep hygiene    Progress toward short term goals:Poor progress, continues to struggle with sleep hygiene as she is thinking/worrying about inability to sleep and also clock watching. Stopped taking her medications without guidance of a physician. Does not feel that medications or other skills/interventions are helping her.      Review of long term goals: Not done at today's visit Effective 11/28/18- 2/28/18.     Diagnosis:   1. Severe episode of recurrent major depressive disorder, with psychotic features    2. PTSD (post-traumatic stress disorder)    R/O Somatic Symptom Disorder    Plan and Follow up: Patient is scheduled to return for follow up psychotherapy on 2/26/18.     Discharge Criteria/Planning: Client has chronic symptoms and ongoing therapy for maintenance stability recommended.    BIMAL Arevalo 2/12/2018

## 2021-06-16 NOTE — TELEPHONE ENCOUNTER
Telephone Encounter by Pinky Abbott at 10/2/2019  2:04 PM     Author: Pinky Abbott Service: -- Author Type: --    Filed: 10/2/2019  2:04 PM Encounter Date: 9/30/2019 Status: Signed    : Pinky Abbott       PRIOR AUTHORIZATION DENIED    Denial Rational: Patient is able to get up to 1 capsule per day for a maximum of 120 days within 365 days.  This is the duration set by patient's plan for PPIs.  This limit is shared across all PPIs.         Appeal Information: This medication was denied. If physician would like to appeal because patient has contraindication or allergy to covered medication please write letter of medical necessity and route back to PA team to initiate.  If no further action is needed please close encounter thank you.

## 2021-06-16 NOTE — TELEPHONE ENCOUNTER
Telephone Encounter by Pinky Abbott at 4/24/2019  9:41 AM     Author: Pinky Abbott Service: -- Author Type: --    Filed: 4/24/2019  9:42 AM Encounter Date: 4/22/2019 Status: Signed    : Pinky Abbott       PRIOR AUTHORIZATION DENIED    Denial Rational: needs to try/fail three covered drugs Cromolyn Opth 4%, Azelastine Opth 0.05%, and OTC Ketotifen or Alaway Opth 0.025%        Appeal Information: This medication was denied. If physician would like to appeal because patient has contraindication or allergy to covered medication please write letter of medical necessity and route back to PA team to initiate.  If no further action is needed please close encounter thank you.

## 2021-06-16 NOTE — PROGRESS NOTES
Psychiatric  Progress Note  Date of visit:3/8/2018         Discussion of Care and Treatment Recommendations:   This is a 38 y.o. female with history of MDD with psychotic feature presenting to the clinic today .Patient does not speak English, therefore,  services were used to address the complexities of language interpretation in this meeting - phone  services utilized as in person scheduled  was unavailable. In person  is preferred and recommended for this patient as pt provides brief limited responses and contradicting information that require constant clarification . This has been communicated to  services department for future visits.    Last visit  1/24/18  Recommendation at last visit.  Continue  Seroquel 25 mg at bedtime-MDD with psychotic feature,  Remeron 30 mg at time-MDD  2 -continue with weekly psychotherapy sessions  4-Return to the clinic in 6 weeks call in between visits with any questions or concerns    Patient and I reviewed diagnosis and treatment plan and patient agrees with following recommendations:  Ongoing education given regarding diagnostic and treatment options with adequate verbalization of understanding.  Plan   1.Increase  Seroquel to  50 mg at bedtime-MDD with psychotic feature,  Remeron 30 mg at time-MDD  2 -continue with weekly psychotherapy sessions  4-Return to the clinic in 6 weeks call in between visits with any questions or concerns         Diagnoses:   Severe episode of recurrent major depressive disorder, with psychotic features      PTSD (post-traumatic stress disorder    Patient Active Problem List   Diagnosis     Trouble in sleeping     Anemia     Hepatitis B     Severe major depression with psychotic features     Hyperlipidemia     Vitamin D deficiency     Severe episode of recurrent major depressive disorder, with psychotic features     PTSD (post-traumatic stress disorder)             Chief Complaint /  "Subjective:    Chief complaint: \" I have not been sleeping well \"     History of Present Illness:   The patient statement-she has been sleeping about 2 hours each night and staying awake during the day.  At first she said nothing keeps her awake and that she just wakes up and stares at  the walls then later stated that voices keep her awake at night.  At some point she also stated that she gets vague suicidal ideations but with no plan or intent to act on them, when I inquired about this later she tells me that these has not happened in a long time.  She  denies any nightmares or flashbacks .  I noted from her therapist visits that she stopped attending English language school.  She did let me know that she will go back to school soon.  She also reported that she gets restless legs and arms and has reported these to a doctors.  I did remind her to speak with her daughter primary doctor and let them know about these.  She denies delusions.  Paranoia.  She endorses auditory hallucinations and denies visual hallucinations.  She denies suicidal ideations at one point and endorsed them at another point but denies any intent or plans to act on them.  She tells me she feels safe she verbally contracts for safety.  She did not appear to be in any apparent distress and did not appear to be in any imminent danger to herself or others.  Because she has continued to have auditory hallucinations despite being on 25 mg of Seroquel today increased her dose to 50 mg to see if that will help her sleep better and also decrease or eliminate hallucinations.  I will have her return to the clinic in 12 weeks for follow-up appointment and encouraged her to call in between visits with any questions and concerns and to continue with psychotherapy.  I also alerted clinic support staff to indicating her chart and that I highly recommend on In -person  versus phone  services due to the limited information shared by " patient and also due to the fact that she does experience auditory hallucinations and am unsure whether this could be having any effect on her or not.  Mental Status Examination:   General: Adequate hygiene, cooperative  Speech: Normal in rate and tone  Language: Intact  Thought process: Coherent  Thought content:                           Auditory hallucinations- absent                           Visual Hallucinations - Absent                           Delusions Absent                           Loose Associations:  No                          Suicidal thoughts: Absent                          Homicidal thoughts: Absent                       Affect: Neutral  Mood: Neutral   Intellectual functioning: Within normal limits  Memory: Within normal limits  Fund of knowledge: Average  Attention and concentration: Within normal limits  Gait: Steady  Psychomotor activity: Calm, no agitation  Muscles: No atrophy, no abnormal movements  InSight and judgment: Fair    Medication changes: See Above   Medication adherence: compliant  Medication side effects: absent  Information about medications: Side effects, benefits and alternative treatments discussed and patient agrees with capacity to do so.    Psychotherapy: Supportive therapy day-to-day living    Education: Diet, exercise, abstinence from drugs and alcohol, patient will not drive if sedated and medications or  under influence of any substance    Lab Results:   Personally reviewed and discussed with the patient    Lab Results   Component Value Date    WBC 7.9 09/28/2017    HGB 11.6 (L) 09/28/2017    HCT 37.0 09/28/2017     09/28/2017    CHOL 208 (H) 02/22/2017    TRIG 137 02/22/2017    HDL 37 (L) 02/22/2017    ALT 12 11/01/2017    AST 14 11/01/2017     09/28/2017    K 3.6 10/05/2017     09/28/2017    CREATININE 0.81 09/28/2017    BUN 10 09/28/2017    CO2 25 09/28/2017    TSH 0.61 02/22/2017     Vital signs:  Vitals:    03/08/18 1415   BP: 113/70   Patient  "Site: Right Arm   Patient Position: Sitting   Cuff Size: Adult Regular   Pulse: 83   Resp: 16   Temp: 98.1  F (36.7  C)   TempSrc: Oral   Weight: 118 lb (53.5 kg)   Height: 5' 1\" (1.549 m)     Allergies:   Allergies   Allergen Reactions     Acetaminophen Dizziness          Medications:     Current Outpatient Prescriptions on File Prior to Visit   Medication Sig Dispense Refill     miconazole (MICATIN) 2 % cream Apply to affected area 2 times daily 15 g 1     mirtazapine (REMERON) 30 MG tablet Take 1 tablet (30 mg total) by mouth at bedtime. 30 tablet 5     QUEtiapine (SEROQUEL) 25 MG tablet Take 1 tablet (25 mg total) by mouth at bedtime. 30 tablet 1     cholecalciferol, vitamin D3, (VITAMIN D3) 2,000 unit Tab Take 1 tablet (2,000 Units total) by mouth daily. 100 tablet 3     No current facility-administered medications on file prior to visit.               Review of Systems:    Otherwise reminder of review of systems is negative    Coordination of Care:   More than 25 minutes spent on this visit  with more than 50% of time spent on coordination of care including: Educating patient about diagnosis, prognosis, side effects and benefits of medications, diet, exercise.  Time also spent on entering orders and preparing documentation for the visit.Time also spent providing supportive therapy regarding above issues.    This note was created using a dictation system. All typing errors or contextual distortion is unintentional and software inherent.    "

## 2021-06-16 NOTE — PROGRESS NOTES
Outpatient Mental Health Treatment Plan     Name:  Silvino Caceres  :  1980  MRN:  [id number]     Treatment Plan:  Updated Treatment Plan  Intake/initial treatment plan date:  2017                   Benefit and risks and alternatives have been discussed: Yes  Is this treatment appropriate with minimal intrusion/restrictions: Yes  Estimated duration of treatment:  Approximately 20 sessions.  Anticipated frequency of services:  Every 1-2 weeks  Necessity for frequency: This frequency is needed to establish therapeutic goals and for continuity of care in order to monitor progress.  Necessity for treatment: To address cognitive, behavioral, and/or emotional barriers in order to work toward goals and to improve quality of life.     Plan:                           ? Depression                                     Goal:              Decrease average depression level from 4 to 1.                        Strategies:                 ?[x] Decrease social isolation                                                                    [x] Increase involvement in meaningful activities                                                                    ?[x] Discuss sleep hygiene                                                                    ?[x] Explore thoughts and expectations about self and others                                                                    ?[x] Process grief (loss of significant person, independence, role, etc.)                                                                    ?[x] Assess for suicide risk                                                                    ?[x] Implement physical activity routine (with physician approval)                                                                    [x] Consider introduction of bibliotherapy and/or videos                                                                    [x] Continue compliance with medical treatment plan (or explore  barriers)                                                                       ?     ?Degree to which this is a problem: 4  Degree to which goal is met: 1  Date of Review: 2/26/2018-5/26/2018     Anger management  Goal:              Decrease anger episodes from 4 to 1.                        Strategies:                 ? [x] Learn and practice relaxation techniques and other coping strategies  ? [x] Explore thoughts and expectations about self and others                                                                    ? [x] Identify and monitor triggers for anger                                                                    ? [x] YesExplore thoughts and expectations about self and others                                                                                      ?-Learn and implement DBT skills?     Degree to which this is a problem: 4  Degree to which goal is met: 1  Date of Review: 02/26/2018-05/26/2018     ?Other:  PTSD  Goal: Decrease average impact of PTSD symptoms from 4 to 1                        Strategies:     - Forge a therapeutic alliance built on trust in order to allow for further processing of trauma experience.    - Assessment of client-acculturation process. Psycho-education about western-therapy.    - Exposure therapy and psycho-education to develop a sense normalization, legitimization, and description of trauma reactions.     - Development of trauma timeline or narrative.    - Cognitive Restructuring to help make sense of the trauma and to put meaning to the trauma- develop new insights and thorough understanding of behaviors during the event- modify feelings of guilt and shame.     - Learn and implement somatic/experiential techniques.    - Learn and Implement Mindfulness/Grounding techniques to decrease hyperarousal.             ?                              Degree to which this is a problem: 4  Degree to which goal is met: 1  Date of Review:  "02/26/2018-05/26/2018           Functional Impairment:   Personal: 4  Family: 3  Work: 2  Social:4     Diagnosis:   1. Severe episode of recurrent major depressive disorder, with psychotic features    2. PTSD (post-traumatic stress disorder)             WHODAS 2.0 12-item version: Total = 13 or 27%        Scores presented in qualifiers to represent level of disability.  MODERATE Problem (medium, fair, ...) 25-49%      H1= 30  H2= \"unsure\"  H3= \"some because cough and cold and tired.\"           Clinical assessments and measures completed:. SNEHAL-7, PHQ-9, CAGE-AID and PANSI   Updated on 2/26/18: PANSI, PHQ-9.     Strengths: Patient is resourceful and resilient. She maintains regularly appointments and always attends.       Limitations: Patient appeared to be a poor historian, lacking insight regarding the impact of underlying emotional issues. Despite extensive prompting, the patient had difficulty engaging in the therapeutic process but displayed some willingness to gain trust in the therapist. She is often guarded in session. Recently stopped taking her medications without oversight of a physician.     Cultural Considerations: The patient is a [place] female who was born in [place]. She grew up in [place] and eventually lived in a refugee camp which she left at age 25. She moved to [place] in 2011 but first lived in [place] and [place] before finally relocating to [place] in 2016. The patient does not speak English and requires the assistance of a [place] .     Persons responsible for this plan: Patient, Provider and Other: Coordinate with PCP and psychiatry as appropriate.          Psychotherapist Signature           Patient Signature:              Guardian Signature             Provider: Performed and documented by BIMAL Arevalo   Date:  2/26/2018      "

## 2021-06-16 NOTE — PROGRESS NOTES
Mental Health Visit Note    2/26/2018  Start time: 12:00pm    Stop Time: 1:00pm   Session #4    Silvino Caceres is a 38 y.o. female is being seen today for    Chief Complaint   Patient presents with     MH Follow Up     Patient presented fro follow up psychotherapy appointment to address symptoms of PTSD, somatic concerns, and depression.     New symptoms or complaints: None    Functional Impairment:   Personal: 4  Family: 3  Work: 2  Social:4    Clinical assessment of mental status:   Grooming: Well groomed  Attire: Appropriate- appropriate for weather and occasion.  Age: Appears Stated  Behavior Towards Examiner: Cooperative and Guarded/Evasive at times  Motor Activity: Within normal   Eye Contact: Avoidant  Mood: Depressed  Affect: Constricted, Flat and Depressed  Speech/Language: Soft and Monotone  Attention: Distractible  Concentration: Brief  Thought Process: Slow  Thought Content: Hallucinations: Visual  Delusions: none reported  Orientation: X 3  Memory: Impairment, Recent and Remote  Judgement: Impairment and Minimal  Estimated Intelligence: Below Average  Demonstrated Insight: Diminished  Fund of Knowledge: adequate    Suicidal/Homicidal Ideation present: None Reported This Session     Patient's impression of their current status: Patient reports body aches and headaches. She recently stopped attending school a couple of weeks ago because she has not been feeling well. She reports she had the flu, but has not sought medical care. She reports she is starting to feel better. Her schooling was to learn English- writing and reading. She has been going for 5 years and attends for 4 hours a day. She plans to go back and reports she has friends there. She has not been taking her medications consistently. It is unclear if she stopped completely as at one point she stated she takes as needed. She does not feel her medications helped her fall asleep. She continues to have visual hallucinations, but is not afraid of  "them. She does not feel they affect her in any way. To help with her impaired sleep, she has been trying to do things that make her body tired, such as more cooking and cleaning around the house. She is not happy. She is able to recall a time in her life when she was happy- around ages 18 to 20 when [place] [facility]. It was during the war, but before going to the [place]. She recalls spending a lot of time with her best friends and working together. She still talks to these friends at times, but [place] [facility]. Although she has a friend at school here, she is still not happy. She reports feeling \"heavy, tired and not wanting to talk to anyone.\"    Therapist impression of patients current state:  Patient presented for follow up therapy appointment with [facility] . Patient appeared depressed with flat affect. She was well groomed and dressed for the weather with boots and a coat. There are times when her reports are very confusing and it is hard to get a clear answer from her. She continues to provide short answers with limited information. On occasion, she contradicts herself. It is important for extra time to be spent on clarification, which requires aligning more with the  to ensure information is communicated effectively. Therapist encouraged continued school attendance- explored the benefits of going to school as it is her goal to finish school. School could assist with providing socialization, structure in her day, purpose and meaning, increasing self esteem and developing a sense of mastery. 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 Patient showed her ability to implement some problem solving and coping skills, such as doing more cooking and cleaning in order to feel tired so she can sleep better. She expresses a desire to be happy and decrease her anger with " "herself. Therapist would like to spend more time processing anger and developing insight into the anger. There were some challenges with the use of motivational interviewing techniques in session today as she struggled to answer questions about what would be different or what steps she could take to create changes. She lacks the insight or ideas for creating her own happiness as she appears to feel stuck. For example when talking about her desire to be happy, she said, \"life is good.\" She is unable to state what would make life good or make her happy. Extra time is required for digging deeper through insight development and clarification in communication. Updated treatment plan and goals. Also updated PANSI and PHQ-9 measure. Measure is currently indicating more moderate levels of depression. Therapist encouraged patient to follow up with psychiatrist about her concerns with the medications and the fact that she stopped taking them.     PANSI: Positive Factors = 3 (<3.4 = high risk)               Negative Factors = 1 (>1.6 = high risk)               Indicates high risk for suicide.    PHQ-9: 12 . Difficulty with daily functioning= very difficult.              Indicates moderate severity.    Type of psychotherapeutic technique provided: Insight oriented, Client centered and Solution-focused, psychoeducation, motivational interviewing    Progress toward short term goals:Poor progress, still not taking her medications as prescribed. Was able to identify more goals today. PANSI and PHQ-9 scores seem to have improved since last completed.      Review of long term goals: Treatment Plan updated and Treatment plan effective 2/26/2018-5/26/2018     Diagnosis:   1. Severe episode of recurrent major depressive disorder, with psychotic features    2. PTSD (post-traumatic stress disorder)    3. Somatic symptom disorder        Plan and Follow up: Patient is scheduled to return for follow up psychotherapy on 3/12/18.     Discharge " Criteria/Planning: Client has chronic symptoms and ongoing therapy for maintenance stability recommended.    Rae Lentz, GERTRUDESW 2/26/2018

## 2021-06-16 NOTE — PROGRESS NOTES
"Silvino Collado Say is a 41 y.o. female here for f/u arm pain    ASSESSMENT/PLAN:   Silvino was seen today for follow-up.    Diagnoses and all orders for this visit:    Lateral epicondylitis of right elbow  Mild, patient declines intervention. Offered nsaids, PT.   Will have her sign [facility] for [facility] - no records re: hep B. No LFTs checked recently in epic.         Return in about 6 months (around 9/18/2021) for Annual physical.       ======================================================    SUBJECTIVE  Silvino Collado Say is a 41 y.o. female here for follow up arm pain.     Feels like her pain only happens when she lifts something heavy.   She works a few times per week, when they have openings.   She gets leg pain when she stands for a while    Declines any treatment for the pain right now.   She wants to wait and see if it will get better.     Per chart, patient has chronic viral hep B managed by [facility], but no notes in chart.   Patient very unsure when she was last there - one or two or three years ago.         ROS  Complete 10 point review of systems negative except as noted above in HPI    Reviewed Past Medical History, Medications, Family History and Social History in Epic and up to date with no new changes.    OBJECTIVE  BP 92/56   Pulse 77   Temp 97.9  F (36.6  C) (Oral)   Resp 16   Ht 5' 0.75\" (1.543 m)   Wt 126 lb (57.2 kg)   LMP 02/20/2021 (Exact Date)   SpO2 98%   Breastfeeding No   BMI 24.00 kg/m       General: Cooperative, pleasant, in no acute distress  HEENT: PERRL, EOMI.  TM normal bilaterally.  Pharynx normal without erythema.  Tonsils normal without hypertrophy or exudates.  Neck: no lymphadenopathy, no masses  CV: RRR, normal S1/S2, no murmur, rubs, gallops  Resp: No respiratory distress. Clear to auscultation bilaterally. No wheezes, rales, rhonchi  Abd: Nontender, nondistended, bowel sounds present  Ext: radial/pedal pulses +2 bilaterally. Point tenderness over lateral epicondyle of right arm.   MSK: Normal " muscle tone  Neuro: CN II-XII intact  Skin: warm, well perfused. No rashes  Psych: No suicidal or homicidal ideations, no self-harm.  Normal affect.    LABS & IMAGES   Results for orders placed or performed in visit on 12/18/20   Pregnancy, Urine   Result Value Ref Range    Pregnancy Test, Urine Negative Negative   Thyroid Cascade   Result Value Ref Range    TSH 0.86 0.30 - 5.00 uIU/mL   HM1 (CBC with Diff)   Result Value Ref Range    WBC 7.5 4.0 - 11.0 thou/uL    RBC 4.11 3.80 - 5.40 mill/uL    Hemoglobin 12.3 12.0 - 16.0 g/dL    Hematocrit 38.0 35.0 - 47.0 %    MCV 93 80 - 100 fL    MCH 29.9 27.0 - 34.0 pg    MCHC 32.4 32.0 - 36.0 g/dL    RDW 13.6 11.0 - 14.5 %    Platelets 169 140 - 440 thou/uL    MPV 12.9 (H) 8.5 - 12.5 fL    Neutrophils % 70 50 - 70 %    Lymphocytes % 21 20 - 40 %    Monocytes % 7 2 - 10 %    Eosinophils % 2 0 - 6 %    Basophils % 1 0 - 2 %    Immature Granulocyte % 0 <=0 %    Neutrophils Absolute 5.2 2.0 - 7.7 thou/uL    Lymphocytes Absolute 1.6 0.8 - 4.4 thou/uL    Monocytes Absolute 0.5 0.0 - 0.9 thou/uL    Eosinophils Absolute 0.1 0.0 - 0.4 thou/uL    Basophils Absolute 0.1 0.0 - 0.2 thou/uL    Immature Granulocyte Absolute 0.0 <=0.0 thou/uL         ======================================================    Visit was completed along with Yareli hoang    Options for treatment and follow-up care were reviewed with the patient. Silvino Hollyy Say and/or guardian was engaged and actively involved in the decision making process. Silvino Hollyy Say and/or guardian verbalized understanding of the options discussed and was satisfied with the final plan.      John Perales MD

## 2021-06-16 NOTE — PROGRESS NOTES
Pt arrived, but no  here, so during the appt we utilized the telephone  line.     Reason for visit - Follow up.  Sleep - Hard at times, I can't sleep. Sometimes 1-2 hours at a time. Has restless leg syndrome and her arms and legs keep her from falling asleep.   Depression - I am not much depressed.   Anxiety - Yes, related to my illness. I want to be cured of my illness. Referring to her liver, says she hasn't been told anything about it, but her primary wants to see her once a year to monitor. Goes back again in April.  Panic attacks - No.    SI/HI - Denies. Pt reports hearing voices and/or seeing things, but cannot describe it.   Therapist - Rae Lentz, every 2 weeks.   Side effects from medication - Denies.    No  to review as patient is not taking any mediations that require reporting.
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
## 2021-06-16 NOTE — PROGRESS NOTES
"  Mental Health Visit Note    03/26/2018  Start time: 12:10pm    Stop Time: 12:55pm   Session #6     Silvino Caceres is a 38 y.o. female is being seen today for    Chief Complaint   Patient presents with     MH Follow Up     Patient presented for follow up psychotherapy appointment to address symptoms of depression and psychosis.     New symptoms or complaints: Continues to be depressed. Inability to sleep. Stopped all medications.    Functional Impairment:   Personal: 4  Family: 3  Work: 2  Social:4    Clinical assessment of mental status:   Grooming: Well groomed  Attire: Appropriate- appropriate for weather and occasion.  Age: Appears Stated  Behavior Towards Examiner: Cooperative and Guarded/Evasive at times  Motor Activity: Within normal   Eye Contact: Avoidant  Mood: Depressed  Affect: Constricted, Flat and Depressed  Speech/Language: Soft and Monotone  Attention: Distractible  Concentration: Brief  Thought Process: Slow  Thought Content: Hallucinations: Visual  Delusions: none reported  Orientation: X 3  Memory: Impairment, Recent and Remote  Judgement: Impairment and Minimal  Estimated Intelligence: Below Average  Demonstrated Insight: Diminished  Fund of Knowledge: adequate    Suicidal/Homicidal Ideation present: None Reported This Session     Patient's impression of their current status: Patient reports she is \"not good\" because she has not gotten enough sleep. She has been sleeping at the hospital as her aunt is admitted. She has been spending most of the last week there. She stopped all of her medications. She feels they cause problems and illnesses. She reports she didn't see a difference while taking them, so she stopped. She felt her medications made her \"dumb.\" She noted she doesn't want to see psychiatry again- she is scheduled for 5/24/18. She has not been going to school because she is too busy. She has attended 1-2 times/month I the last 3 months. She reports she is annoyed by her classmates who " "don't listen and just talk. She identifies feeling happy for a short time at [employer] and school. She reports worries about her physical health and wondering if she will get better. She eats 2x/day- good appetite. No exercise, but does housework. Denies doing any meaningful activities and can't identify any hobbies.      Therapist impression of patients current state:  Patient presented for follow up therapy appointment with [facility]  (See M). Patient appeared depressed and tired. She continues to be distant in session and provides relatively short responses. She requires much prompting for engagement. She struggles with medication adherence due to her view of medications as well as not feeling like they were effective. She has not informed her psychiatrist about her feelings about the medications. During the last session, patient had stated she used meditation in the past to help with sleep. Therefore, therapist was considering engaging in more meditation practices with patient. However, today she reports she didn't use medication in the past. It is not clear how there was confusion- if patient changed her response or problems with the interpretation. In the future, more CBT skills for sleep hygiene might be helpful. Patient may also find helpful tips in the book, \"The Chemistry of Deb\" as it uses non-medication approaches to managing depression. Patient does seem to have some socialization with her classmates even though some are from other countries. She noted that some classmates call her. Explored taking time out of the week for activities she enjoys or going to school. At this time, she seems to be spending most of her time caregiving for family.     Type of psychotherapeutic technique provided: Insight oriented, Client centered, Solution-focused and CBT    Progress toward short term goals:Poor progress, continues to struggle with severe symtpoms. Stopped all of her medications. Prior to next session, " patient plans to go back to school and see her friends.     Review of long term goals: Not done at today's visit and Treatment plan effective 2/26/2018-5/26/2018     Diagnosis:   1. Severe episode of recurrent major depressive disorder, with psychotic features    2. PTSD (post-traumatic stress disorder)        Plan and Follow up: Patient is scheduled to return for follow up psychotherapy on 4/9/2018.     Discharge Criteria/Planning: Client has chronic symptoms and ongoing therapy for maintenance stability recommended.    BIMAL Arevalo 3/26/2018

## 2021-06-16 NOTE — PROGRESS NOTES
Mental Health Case Consultation    Provider:  Rae Lentz [facility]    :  1980   Diagnosis(es): PTSD, Major Depressive Disorder, severe with psychotic features.     Setting:     X    Clinical Staff Meeting  ?    Supervision     Purpose:  X  Continuity of Care     ?  Intake     ?  90 Day Review     ?  Discharge       ? High Risk      Clinicians present:   [x] Mango Azul, [facility] [x] Mary Angeles, [facility]  [] Other:    [x] Jessica Pace, [facility], LMFT [x] Kiara Guy, [facility], NP Student   [x] Marcus Wisdom, [facility] [x] Satinder Rodriguez, [facility]    [x] Rae Lentz, [facility] [x] Myesha Engel, [facility]    [x] Estephania Butler, [facility] [x] Vandana Lee, [facility]    [x] Yuliana Meeks, [facility] [] Rayray Barrios, [facility], [facility]    [x] Sarah Duran, [facility] [x] Don Montalvo, [facility]    []         Treatment Modalities:  Appropriate? [x] Yes        []No  Comments: insight development, CBT, psycho-education, sleep hygiene, motivational interviewing, mindfulness/relaxation.    Utilization Least Restrictive Environment:    [x]Yes     []No Comments: Patient lives at home and receives outpatient treatment    Description of Impairment/Concern: Recommendation for increased engagement in session and developing insight to help decrease symptom severity.    Treatment Recommendations/Plan: Further exploration of how she interprets happiness. How will she know when she has reached that? What are things she did with her friends before when she was happy. Provide examples. Implementing other modalities such as coloring/drawing about her feelings. Exploring losses. Inquire about head injury/cognitive concerns. Ensure consistent  for psychotherapy appointments.     Rae Lentz [facility]

## 2021-06-16 NOTE — PROGRESS NOTES
Correct pharmacy verified with patient and confirmed in snapshot? [x] yes []no    Charge captured ? [x] yes  [] no    Medications Phoned  to Pharmacy [] yes [x]no  Name of Pharmacist:  List Medications, including dose, quantity and instructions      Medication Prescriptions given to patient   [] yes  [x] no   List the name of the drug the prescription was written for.       Medications ordered this visit were e-scribed.  Verified by order class [x] yes  [] no    Medication changes or discontinuations were communicated to patient's pharmacy: [x] yes  [] no   Seroquel increased to 50 mg at bedtime    UA collected [] yes  [x] no    [place] Prescription Monitoring Program Reviewed? [] yes  [x] no    Referrals were made to: NA    Future appointment was made: [x] yes  [] no   5/24/18 with Liat Aviles CNP    Dictation completed at time of chart check: [] yes  [x] no    I have checked the documentation for today s encounters and the above information has been reviewed and completed.

## 2021-06-17 NOTE — PROGRESS NOTES
Mental Health Visit Note    04/23/2018  Start time: 12:10pm    Stop Time: 12:55pm   Session #8     Silvino Caceres is a 38 y.o. female is being seen today for    Chief Complaint   Patient presents with     MH Follow Up     Patient presented for follow up psychotherapy appointment to address symptoms of depression and PTSD     New symptoms or complaints: Stress. Worried about health of aunt and uncle. Care-giving role.     Functional Impairment:   Personal: 4  Family: 3  Work: 2  Social:4    Clinical assessment of mental status:   Grooming: Well groomed  Attire: Appropriate- appropriate for weather and occasion.  Age: Appears Stated  Behavior Towards Examiner: Cooperative   Motor Activity: Within normal   Eye Contact: Avoidant  Mood: Euthymic  Affect: Constricted and Flat  Speech/Language: Soft and Monotone  Attention: Distractible  Concentration: Brief  Thought Process: Slow  Thought Content: Hallucinations: Visual  Delusions: none reported  Orientation: X 3  Memory: Impairment, Recent and Remote  Judgement: Impairment and Minimal  Estimated Intelligence: Below Average  Demonstrated Insight: Diminished  Fund of Knowledge: adequate    Suicidal/Homicidal Ideation present: None Reported This Session     Patient's impression of their current status: Patient reports she has been providing a lot of care for her aunt and uncle who are sick. This adds to her stress as she attends appointments with them and cares for them at home. She is worried about them. She shared how she moved to [place] from [place] to provide care for them. She lived in [place] for 3 years prior to that. She has been finding time to go to her weekly English classes. Over the weekend she went shopping with some of her friends from school. She is happy about the change in weather as she is often cold and feels her sadness is worse in the winter. She continues to be off of her medications and does not plan to see the psychiatrist in the future. She is  sleeping well at times and has difficulty sleeping other times. She is not sure why. She reports muscle tension in shoulders and neck.    Therapist impression of patients current state:  Patient presented for follow up therapy appointment with TJ lutherer. She continues to be distant in session and provides relatively short responses. Her tension is likely a somatization of stress/anxiety and depression. She did engage in some somatic experiencing practices in session today. During other times, she observed while therapist modeled. She chuckled at times. Therapist provided education behind the concept of mindfulness movements and how breathing and movement can create changes in the nervous system to help with depression and anxiety.  Therapist encouraged physical activity and getting outside in the sunshine to help improve mood. Provided a handout to patient of calming and mindfulness movement to implement outside of session with deep breathing. encouraged implementing in the morning and evening routine.     Type of psychotherapeutic technique provided: Insight oriented, Client centered, CBT and Somatic Experiencing, mindfulness-based    Progress toward short term goals:Progress as expected, went back to school once/week. continues to function well with work and other daily tasks. Continues to endorse severe depression. further implementation of skills outside of session is essential to improvement, especially since she is no longer taking medications.    Review of long term goals: Not done at today's visit and Treatment plan effective 2/26/2018-5/26/2018     Diagnosis:   1. PTSD (post-traumatic stress disorder)    2. Severe episode of recurrent major depressive disorder, with psychotic features        Plan and Follow up: Patient is scheduled to return for follow up psychotherapy on 5/7/18.    Discharge Criteria/Planning: Client has chronic symptoms and ongoing therapy for maintenance stability  recommended.    Rae Lentz, LICSW 4/23/2018

## 2021-06-17 NOTE — PATIENT INSTRUCTIONS - HE
Patient Instructions by Erin Mason PT at 8/13/2019  9:00 AM     Author: Erin Mason PT Service: -- Author Type: Physical Therapist    Filed: 8/13/2019  9:47 AM Encounter Date: 8/13/2019 Status: Signed    : Erin Mason PT (Physical Therapist)        DOORWAY STRETCH - HIGH    While standing in a doorway, place your arms up on the door frame and lean in until a stretch is felt along the front of your chest and/or shoulders. Your upper arms should be placed upward along the door frame.     NOTE: Your legs should control how much you stretch by bending or straightening your knee through the doorway.    Pec corner stretch    Find a comfortable position for your hands and lightly lean forward into the corner until you feel a good stretch in your pecs. Hold 20-30 sec, x2-3 reps          ELASTIC BAND ROWS     Holding elastic band with both hands, draw back the band as you bend your elbows. Keep your elbows near the side of your body.

## 2021-06-17 NOTE — TELEPHONE ENCOUNTER
Telephone Encounter by Pinky Abbott at 2/23/2021  3:17 PM     Author: Pinky Abbott Service: -- Author Type: --    Filed: 2/23/2021  3:23 PM Encounter Date: 2/19/2021 Status: Addendum    : Pinky Abbott    Related Notes: Original Note by Pinky Abbott filed at 2/23/2021  3:18 PM       No PA needed, brand OTC Voltaren gel is covered by plan.    Called pharmacy and made them aware of this. Advised them to call help desk if they have further issues with NDC's.     Pharmacy called back, they do have a paid claim, no further action needed.

## 2021-06-17 NOTE — PROGRESS NOTES
Mental Health Visit Note    04/09/2018  Start time: 12:00pm    Stop Time: 12:42pm   Session #7     Silvino Caceres is a 38 y.o. female is being seen today for    Chief Complaint   Patient presents with     MH Follow Up     Patient presented for follow up psychotherapy appointment to address symptoms of PTSD and depression.     New symptoms or complaints: Continues to be depressed. Inability to sleep. Stopped all medications.    Functional Impairment:   Personal: 4  Family: 3  Work: 2  Social:4    Clinical assessment of mental status:   Grooming: Well groomed  Attire: Appropriate- appropriate for weather and occasion.  Age: Appears Stated  Behavior Towards Examiner: Cooperative and Guarded/Evasive at times  Motor Activity: Within normal   Eye Contact: Avoidant  Mood: Depressed  Affect: Constricted, Flat and Depressed  Speech/Language: Soft and Monotone  Attention: Distractible  Concentration: Brief  Thought Process: Slow  Thought Content: Hallucinations: Visual  Delusions: none reported  Orientation: X 3  Memory: Impairment, Recent and Remote  Judgement: Impairment and Minimal  Estimated Intelligence: Below Average  Demonstrated Insight: Diminished  Fund of Knowledge: adequate    Suicidal/Homicidal Ideation present: None Reported This Session     Patient's impression of their current status: Patient reports she is tired and always cold. She feels it may be due to the weather and that she is not sleeping well. She sleeps on blankets on the floor. She had a bed, but feels too hot when she sleeps on the bed. She sleeps from midnight/1am to 6/7am. She finds it hard to sleep and wakes at times in the night. When she wakes up, she stays in bed, looks at her phone clock, or gets up to try to kill cockroaches. She is not afraid of them. She denies frequent thoughts that keep her awake. She denies feeling afraid. She does report memories from the past come up when she can't sleep. Denies any dizziness. Not cold at night. Does  "report some side pain/ache from medical problems. No caffeine use. She continues to be off of her psychotropic medications. She started going back to school once/week. Her aunt is back home.  Patient reports her anger is better- decreased frequency and severity. She has times of feeling angry with self due to health problems. She gets angry towards others when they don't listen or if she doesn't like the way they talk to her.      Therapist impression of patients current state:  Patient presented for follow up therapy appointment with TJ Downs . There were times in the assessment when the  stated that patient was confused and doesn't always understand. Therapist assisted with rephrasing and further explanation as needed for clear communication. Patient appeared depressed and tired. She continues to be distant in session and provides relatively short responses. She requires much prompting for engagement. Reviewed sleep hygiene habits. She appears to lack awareness of any thought she is having, especially while struggling to sleep. The conditions of her environment may also play a role in her sleep. Provided positive reinforcement for going back to school once/week. Explored implementing more relaxation breathing or progressive muscle relaxation techniques. Encouraged implementation of skills in session and outside. Patient was resistant to engage during session today and reports she doesn't feel like those things help. Patient may also find helpful tips in the book, \"The Chemistry of Deb\" as it uses non-medication approaches to managing depression.     Type of psychotherapeutic technique provided: Insight oriented, Client centered, Solution-focused and CBT    Progress toward short term goals:Progress as expected, went back to school once/week. continues to function well with work and other daily tasks. Continues to endorse severe depression..      Review of long term goals: Not done at today's " visit and Treatment plan effective 2/26/2018-5/26/2018     Diagnosis:   1. PTSD (post-traumatic stress disorder)    2. Severe episode of recurrent major depressive disorder, with psychotic features        Plan and Follow up: Patient is scheduled to return for follow up psychotherapy on 4/23/2018, 5/7/18.    Discharge Criteria/Planning: Client has chronic symptoms and ongoing therapy for maintenance stability recommended.    BIMAL Arevalo 4/9/2018

## 2021-06-17 NOTE — PROGRESS NOTES
"  Mental Health Visit Note    05/07/2018  Start time: 12:05pm    Stop Time: 12:55pm   Session #9     Silvino Caceres is a 38 y.o. 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 Sleep concerns at times. Tired heart.    Functional Impairment:   Personal: 3  Family: 3  Work: 2  Social:3    Clinical assessment of mental status:   Grooming: Well groomed  Attire: Appropriate  Age: Appears Stated  Behavior Towards Examiner: Cooperative and Sullen   Motor Activity: Within normal   Eye Contact: Avoidant  Mood: Euthymic  Affect: Constricted and Flat  Speech/Language: Soft and Monotone  Attention: Distractible  Concentration: Brief  Thought Process: Slow  Thought Content: Hallucinations: Within noraml  Delusions: Within normal  Orientation: X 3  Memory: Impairment, Recent and Remote  Judgement: Impairment and Minimal  Estimated Intelligence: Below Average  Demonstrated Insight: Diminished  Fund of Knowledge: adequate    Suicidal/Homicidal Ideation present: Yes: Passive SI- denies any active plans or means. contracted for safety.  Reviewed crisis plan to call 911, go to nearest ER, or contact other supports/services. Provided printout of contact information for [facility] hospital crisis, suicide hotline, and  urgent care.       Patient's impression of their current status: Patient reports she continues to provide a lot of care for her aunt and uncle who are sick. She is not taking any medications at this time. Her heart feels tired. It has always felt that way since she was a child (5-6 years old). \"It can't go away.\" She stated it is because of a small light that got bigger. She reports it affected others too and they were trying to make the light disappear. It eventually got smaller and smaller. The war was not going on at that time and " things were peaceful. She reports her mood has improved recently with the weather changes. She denies feeling anxious/worried. Her sleep is on and off. She endorses feeling tired and having muscle tension.       Therapist impression of patients current state:  Patient presented for follow up therapy appointment with TJ Downs , [facility]. She continues to be distant in session and provides relatively short responses. She requires prompting. She does not come with any thoughts or concerns she wants to discuss. The  expressed much confusion and not understanding her description about the light that she feels caused her heart to be tired. Through further inquiry, it appears it was spiritual. Her father made it disappear. Patient feels her heart will always feel this way. She reports she is happy with life and does not identify any changes she would like to make. She expressed a desire to discharge from psychotherapy services. She reports improvement in depression. She is not interested in psychiatry services either as she is not taking her psychotropic medications.   PHQ-9 score was 5.   Whodas score was 7.  Therapist routed note to PCP and psychiatrist for coordination of care and services to notify of discharge from psychotherapy services.     Type of psychotherapeutic technique provided: Insight oriented, Client centered and Solution-focused, Motivational Interviewing    Progress toward short term goals:Progress as expected, reports improvement in depression symptoms. PHQ-9 measure supports this as well. States she has been implementing some of the coping skills outside of session. Although she is unsure if they are helpful. She continues to be off of medications and has no desire to take psychotropic medications.     Review of long term goals: Long-term goals reviewed to assess progress and Treatment plan effective 2/26/2018-5/26/2018     Diagnosis:   1. Severe episode of recurrent major  depressive disorder, with psychotic features    2. PTSD (post-traumatic stress disorder)        Plan and Follow up: Patient is not interested in scheduling another follow up therapy session. Plans is to discharge patient at this time. Recommended patient schedule to see PCP if any symptoms or concerns arise. Noted patient is welcome to re-establish care with this provider in the future if interested.     Discharge Criteria/Planning: Other: Plan is to discharge per patient request- lack of interest and engagement in therapy. Denies any changes or improvements she would like to make. Requesting discharge.    BIMAL Arevalo 5/7/2018

## 2021-06-17 NOTE — PROGRESS NOTES
PSYCHOTHERAPY DISCHARGE SUMMARY     Dates of service  ____________  to ____________ # Sessions completed: __________      Diagnosis  at Intake: ______________________________________________________      WHODAS _______    Level of Functioning  Personal: 1, 2, 3, 4   Social:  1, 2, 3, 4     Family:  1, 2, 3, 4   Work/School:   1, 2, 3, 4      Diagnosis at Discharge: ___________________________________________________    WHODAS _______    Level of Functioning  Personal: 1, 2, 3, 4   Social:  1, 2, 3, 4     Family:  1, 2, 3, 4   Work/School:   1, 2, 3, 4        Progress toward goal 1: __________________________________                    Unmet ?    Partially met ?   Met ?      Progress toward goal 2: __________________________________      Unmet ?   Partially met ?   Met ?      Additional goals:          ___________________________________      Unmet ?    Partially met ?   Met ?    Additional comments: _____________________________________________________  ______________________________________________________________________  ______________________________________________________________________    Reason for discharge: Pt dropped out ?  Goals partially met ?  Goals met ?  Other ? ________    Prognosis at discharge:   Poor ?           Guarded ?  Good ?      Recommendations and referrals at discharge: ______________________________________________________________________       ______________________________________________________________________      Provider Signature: ____Melissa Tor LICSW_____________________     Date: ______05/07/2018_____________

## 2021-06-17 NOTE — PATIENT INSTRUCTIONS - HE
Patient Instructions by Erin Mason PT at 8/20/2019  9:00 AM     Author: Erin Mason PT Service: -- Author Type: Physical Therapist    Filed: 8/20/2019  9:17 AM Encounter Date: 8/20/2019 Status: Signed    : Erin Mason PT (Physical Therapist)          ELASTIC BAND EXTENSION BILATERAL SHOULDER    While holding an elastic band with both arms in front of you with your elbows straight, pull the band downwards and back towards your side.    x10-15       SCAPULAR RETRACTIONS    Draw your shoulder blades back and down.  Hold 5 secs x10             COUNTER PUSH UP PLUS  Slowly lower body to parallel with upper arms for push up position.  Then push back to arms straight and round shoulder blades pushing upper back as far away as can from the counter.    x10

## 2021-06-18 NOTE — PATIENT INSTRUCTIONS - HE
Patient Instructions by Maggie Rowe MD at 9/1/2020  8:00 AM     Author: Maggie Roew MD Service: -- Author Type: Physician    Filed: 9/1/2020  9:22 AM Encounter Date: 9/1/2020 Status: Signed    : Maggie Rowe MD (Physician)           Preventing Skin Cancer     Use sunscreen of SPF 30 or greater. Apply liberally.   Relaxing in the sun may feel good. But it isnt good for your skin. In fact, the suns harmful rays are the major cause of skin cancer. This is a serious disease that can be life-threatening. People of all ages, races, and backgrounds are at risk.  Skin cancer is the most common cancer in the [place] But in most cases, it can be prevented.  Your role in prevention  You can act today to help prevent skin cancer. Start by avoiding the suns UV (ultraviolet) rays. And dont use tanning beds or lamps. They are no safer than the sun. Taking these steps can help keep you from getting skin cancer. It can also help prevent wrinkles and other aging effects caused by the sun. Make sure your children also follow these safeguards. Now is the time to start taking steps to prevent skin cancer.  When you are outdoors  Protect your skin when you go out during the day. Take safety steps whenever you go out to eat, run errands by car or on foot, or do any outdoor activity. There isnt just one easy way to protect your skin. Its best to follow all of these steps:    Wear tightly woven clothing that covers your skin. Put on a wide-brimmed hat to protect your face, ears, and scalp.    Watch the clock. Try to stay out of the sun between 10 a.m. and 4 p.m. That's when the sun's rays are strongest.    Head for the shade or create your own. Use an umbrella when sitting or strolling.    Know that the suns rays can reflect off sand, water, and snow. This can harm your skin. Take extra care when you are near reflective surfaces.    Keep in mind that even when the weather is hazy or cloudy, your skin can be exposed to strong UV  "rays.    Shield your skin with sunscreen. Also use sunscreen on your childrens skin. Keep babies younger than 6 months old out of the sun.  Tips for using sunscreen  To help prevent skin cancer, choose the right sunscreen and use it correctly. Try these tips:    Choose a sunscreen that has an SPF (sun protection factor) of at least 30. Also choose a sunscreen labeled \"broad spectrum. This will protect you from both UVA and UVB (ultraviolet A and B) rays.    If one brand irritates your skin, try another, such as one without fragrance.    Use a water-resistant sunscreen if you swim or sweat.    Use at least 1 ounce of sunscreen to cover exposed areas. This is enough to fill a shot glass. You might need to adjust the amount depending on your body size.    Put the sunscreen on dry skin about 15 minutes before going outdoors. This gives it time to soak in.    Reapply sunscreen every 2 hours. If youre active, do this more often.    Cover any sun-exposed skin, from your face to your feet. Dont forget your scalp, ears, and lips.    Know that while sunscreen helps protect you, it isnt enough. Sunscreens extend the length of time you can be outdoors before your skin starts to get red. But they don't give you total protection. Using sunscreen doesn't mean you can stay out in the sun for an unlimited time. Your skin cells are still being damaged. You should also wear protective clothing. And try to stay out of the sun as much as you can, especially from 10 a.m. to 4 p.m.  Date Last Reviewed: 7/1/2019 2000-2019 The "[employer]. [address] [place] [address], [place] [address]. All rights reserved. This information is not intended as a substitute for professional medical care. Always follow your healthcare professional's instructions.             "

## 2021-06-18 NOTE — PROGRESS NOTES
ASSESSMENT/PLAN:  1. Fever  Urinalysis    Culture, Urine    Rapid Strep A Screen-Throat    HM1(CBC and Differential)    HM1 (CBC with Diff)   2. Generalized abdominal pain  HM1(CBC and Differential)    HM1 (CBC with Diff)    Pregnancy (Beta-hCG, Qual), Urine   3. UTI (urinary tract infection)  sulfamethoxazole-trimethoprim (SEPTRA DS) 800-160 mg per tablet       This is a 39 yo female with underlying history of severe depression and PTSD - here for:  1.  Fever - no clear symptoms other than fever - no cough, no sore throat, denies UTI symptoms - labs include UA/UC, rapid strep (this was cancelled as it was not done and no convincing symptoms of strep), hemogram.  These were reviewed.    2.  Generalized abdominal pain - no clear etiology by exam - pregnancy test added - this is negative.    3.  Review of labs suggest possible UTI - will treat with Sulfamethoxazole-trimethoprim.        There are no discontinued medications.  There are no Patient Instructions on file for this visit.    Chief Complaint:  Chief Complaint   Patient presents with     Headache     felt feverish, x3 days     Numbness     left side of jaw, x4 days       HPI:   Silvino Caceres is a 38 y.o. female c/o  Feels achy, cough, fever  Aunt was sick at home - cancer  No tick bites, no mosquito bites    Started with diarrhea, 2 days; no vomiting  Body shaking/chills  Fever was not measured  Headache is on right side    Started Thursday    Works at home -   Hasn't taken anything for fever    Left side of jaw - feels a little numb  No pain in teeth, no infections in mouth    Doesn't remember when last period was   Maybe mid April        PMH:   Patient Active Problem List    Diagnosis Date Noted     Severe episode of recurrent major depressive disorder, with psychotic features 11/28/2017     PTSD (post-traumatic stress disorder) 11/28/2017     Hyperlipidemia 03/14/2017     Vitamin D deficiency 03/14/2017     Anemia 02/13/2017     Hepatitis B 02/13/2017      Severe major depression with psychotic features 02/13/2017     Trouble in sleeping 01/20/2017     Past Medical History:   Diagnosis Date     Depression      H. pylori infection      Hepatitis B      Past Surgical History:   Procedure Laterality Date     LIPOMA RESECTION      left face     Social History     Social History     Marital status: Single     Spouse name: N/A     Number of children: N/A     Years of education: N/A     Occupational History     Not on file.     Social History Main Topics     Smoking status: Never Smoker     Smokeless tobacco: Current User      Comment: betle nut     Alcohol use No     Drug use: No     Sexual activity: No     Other Topics Concern     Not on file     Social History Narrative       Meds:    Current Outpatient Prescriptions:      mirtazapine (REMERON) 30 MG tablet, Take 1 tablet (30 mg total) by mouth at bedtime., Disp: 30 tablet, Rfl: 5     QUEtiapine (SEROQUEL) 25 MG tablet, Take 2 tablets (50 mg total) by mouth at bedtime., Disp: 30 tablet, Rfl: 1     cholecalciferol, vitamin D3, (VITAMIN D3) 2,000 unit Tab, Take 1 tablet (2,000 Units total) by mouth daily., Disp: 100 tablet, Rfl: 3     miconazole (MICATIN) 2 % cream, Apply to affected area 2 times daily, Disp: 15 g, Rfl: 1     sulfamethoxazole-trimethoprim (SEPTRA DS) 800-160 mg per tablet, Take 1 tablet by mouth 2 (two) times a day for 3 days., Disp: 6 tablet, Rfl: 0    Allergies:  Allergies   Allergen Reactions     Acetaminophen Dizziness       ROS:  Pertinent positives as noted in HPI; otherwise 12 point ROS negative.      Physical Exam:  EXAM:  BP 90/68 (Patient Site: Left Arm, Patient Position: Sitting, Cuff Size: Adult Regular)  Pulse (!) 104  Temp 99.4  F (37.4  C) (Oral)   Resp 18  Wt 113 lb (51.3 kg)  LMP 04/13/2018 (Approximate)  BMI 21.35 kg/m2   Gen:  NAD, appears well, well-hydrated, flat affect - little verbal interaction  HEENT:  TMs nl, oropharynx benign, nasal mucosa nl, conjunctiva clear  Neck:   Supple, no adenopathy, no thyromegaly, no carotid bruits, no JVD  Lungs:  Clear to auscultation bilaterally  Cor:  RRR no murmur  Abd:  Soft, nontender, BS+, no masses, no guarding or rebound, no HSM, no CVAT  Extr:  Neg., no joint swelling or tenderness  Neuro:  No asymmetry, Nl motor tone/strength, nl sensation, reflexes =, gait nl, nl coordination, CN intact,   Skin:  Warm/dry        Results:  Results for orders placed or performed in visit on 05/29/18   Urinalysis   Result Value Ref Range    Color, UA Yellow Colorless, Yellow, Straw, Light Yellow    Clarity, UA Clear Clear    Glucose, UA Negative Negative    Bilirubin, UA Small (!) Negative    Ketones, UA Trace (!) Negative    Specific Gravity, UA 1.025 1.005 - 1.030    Blood, UA Trace (!) Negative    pH, UA 6.0 5.0 - 8.0    Protein,  mg/dL (!) Negative mg/dL    Urobilinogen, UA 1.0 E.U./dL 0.2 E.U./dL, 1.0 E.U./dL    Nitrite, UA Negative Negative    Leukocytes, UA Small (!) Negative    Bacteria, UA Few (!) None Seen hpf    RBC, UA 3-5 (!) None Seen, 0-2 hpf    WBC, UA 5-10 (!) None Seen, 0-5 hpf    Squam Epithel, UA 5-10 (!) None Seen, 0-5 lpf    Mucus, UA Few (!) None Seen lpf   HM1 (CBC with Diff)   Result Value Ref Range    WBC 10.2 4.0 - 11.0 thou/uL    RBC 4.28 3.80 - 5.40 mill/uL    Hemoglobin 12.4 12.0 - 16.0 g/dL    Hematocrit 38.8 35.0 - 47.0 %    MCV 91 80 - 100 fL    MCH 28.9 27.0 - 34.0 pg    MCHC 31.9 (L) 32.0 - 36.0 g/dL    RDW 13.5 11.0 - 14.5 %    Platelets 174 140 - 440 thou/uL    MPV 9.6 7.0 - 10.0 fL    Neutrophils % 69 50 - 70 %    Lymphocytes % 17 (L) 20 - 40 %    Monocytes % 12 (H) 2 - 10 %    Eosinophils % 1 0 - 6 %    Basophils % 1 0 - 2 %    Neutrophils Absolute 7.1 2.0 - 7.7 thou/uL    Lymphocytes Absolute 1.8 0.8 - 4.4 thou/uL    Monocytes Absolute 1.2 (H) 0.0 - 0.9 thou/uL    Eosinophils Absolute 0.1 0.0 - 0.4 thou/uL    Basophils Absolute 0.1 0.0 - 0.2 thou/uL   Pregnancy (Beta-hCG, Qual), Urine   Result Value Ref Range     Pregnancy Test, Urine Negative Negative    Specific Gravity, UA 1.025 1.001 - 1.030

## 2021-06-19 NOTE — PROGRESS NOTES
"[facility] clinic EXAM note      Chief Complaint   Patient presents with     burning sensation     when urinated       Due to language barrier, an  was present during the history-taking and subsequent discussion (and for part of the physical exam) with this patient.      Assessment & Plan    Problem List Items Addressed This Visit     None      Visit Diagnoses     Burning with urination    -  Primary: UA not concerning for UTI. LE negative. Did not go to micro or culture. Please see below    Relevant Orders    Urinalysis-UC if Indicated (Completed)    Chronic vaginitis    : Concern for a chronic vaginitis on our discussion. Has used miconzole cream in the past for this with mild improvement. States \"the two pills help\". Given recurrent symptoms did test for diabetes today but A1c was 5.3. Wet prep was negative and I did not see any discharge on exam but did not do  Speculum exam. She is complaining of irritation on the outside. Discussed trial of fluconazole to see if this improves her symptoms. If symptoms persist should return next week for reevaluation. Could consider steroid cream to help with irritation vs repeating UA.     Relevant Medications    miconazole (MICATIN) 2 % cream    fluconazole (DIFLUCAN) 150 MG tablet    Other Relevant Orders    Glycosylated Hemoglobin A1c (Completed)    Wet Prep, Vaginal (Completed)            History    Silvino Caceres is a 38 y.o.  female who presents for the following issues:    Urinary Symptoms:   - Last month had UTI- culture showed mixture of urogenital organisms. Treated with Bactrim. Felt like it helped but not completely.   - Thinks the same symptoms although I see she was denying symptoms at that visit. So very unclear   - Feels tired. shortness of breath. Pain/burning with urination. Since last month  - + Frequency. No Urgency. No hematuria.     Also notes she wants refill of a medication she takes twice a month- a cream for her vagina. When asked if this " helps she states she prefers the pills. Has been diagnosed with yeast infections in the past. Unclear if she is actually taking this medication twice a month or as needed. Last seen for this issue in 12/17. But she states she has itching in this area and not sure if the burning sensation with peeing could actually be from this.       mEDICATIONS    Current Outpatient Prescriptions on File Prior to Visit   Medication Sig Dispense Refill     cholecalciferol, vitamin D3, (VITAMIN D3) 2,000 unit Tab Take 1 tablet (2,000 Units total) by mouth daily. 100 tablet 3     mirtazapine (REMERON) 30 MG tablet Take 1 tablet (30 mg total) by mouth at bedtime. 30 tablet 5     QUEtiapine (SEROQUEL) 25 MG tablet Take 2 tablets (50 mg total) by mouth at bedtime. 30 tablet 1     [DISCONTINUED] miconazole (MICATIN) 2 % cream Apply to affected area 2 times daily 15 g 1     No current facility-administered medications on file prior to visit.        Pertinent past medical, surgical, social and family history reviewed and updated in [employer].    Social History     Social History     Marital status: Single     Spouse name: N/A     Number of children: N/A     Years of education: N/A     Occupational History     Not on file.     Social History Main Topics     Smoking status: Never Smoker     Smokeless tobacco: Current User      Comment: betle nut     Alcohol use No     Drug use: No     Sexual activity: No     Other Topics Concern     Not on file     Social History Narrative         Review of systems     Pertinent Positives and negatives in HPI.     Physical Exam    /60 (Patient Site: Left Arm, Patient Position: Sitting, Cuff Size: Adult Small)  Pulse 76  Temp 98.2  F (36.8  C) (Oral)   Resp 20  Wt 113 lb 0.3 oz (51.3 kg)  LMP 07/09/2018  Breastfeeding? No  BMI 21.35 kg/m2  GEN:  38 y.o. female sitting comfortably in no apparent distress.   HEENT: EOMI, no scleral icterus, buccal mucosa moist  CHEST/LUNG: No respiratory distress, good  air flow to bases, CTAB   CV: RRR, S1, S2 normal; no murmurs, rubs or gallops. No edema. Pulses +2/4 radial b/l.   BACK: no CVA tenderness  ABD:  Soft, non-tender, non distended, no guarding,  No masses. Mild TTP in lower abdomen   MSK:  Strength grossly normal  SKIN: warm, dry, no rashes or lesions  PSYCH:  Mood and affect appropriate  Pelvic exam: VULVA: normal appearing vulva with no masses, tenderness or lesions, VAGINA: normal appearing vagina with normal color and discharge, no lesions.' she states irritation is on the outside along the vulva      Follow up: next week if no improvement.       Trudy Valdez

## 2021-06-19 NOTE — LETTER
Letter by Johnna Morales PA-C at      Author: Johnna Morales PA-C Service: -- Author Type: --    Filed:  Encounter Date: 9/30/2019 Status: Signed         Silvino Hollyyareli Say  [address]  [place] [address]      October 8, 2019      Dear Ms. Say,    We are writing to let you know that your omeprazole won't be covered by insurance anymore (at least until the new year).  Here are some options:  - you can stop the medicine and see how things go  - buy the medicine over-the-counter  - or try a different medicine     Please call our clinic to let us know what you would like to do.    Sincerely,        Electronically signed by Johnna Morales PA-C

## 2021-06-19 NOTE — PROGRESS NOTES
ASSESSMENT/PLAN:  1. Perineal itching, female  Wet Prep, Vaginal    Chlamydia trachomatis & Neisseria gonorrhoeae, Amplified Detection    hydrocortisone (ANUSOL-HC) 2.5 % rectal cream   2. MDD (major depressive disorder), recurrent, severe, with psychosis (H)  QUEtiapine (SEROQUEL) 25 MG tablet   3. Major depression  mirtazapine (REMERON) 30 MG tablet   4. Vitamin D deficiency  cholecalciferol, vitamin D3, (VITAMIN D3) 2,000 unit Tab   5. Dysuria  Urinalysis    Culture, Urine       This is a 39 yo female with:  1.  Recent visits for vaginal / perineal itching.  She indicates she takes monthly medication (?Fluoconazole?) for this - and it is modestly helpful.  She continues to describe symptoms despite recent treatment with antifungal creams.  Exam performed today - really quite normal.  Would try hydrocortisone cream for itching - sparingly.  If no improvement will need re-evaluation.  I have tried my best at reassurance today.  2.  Major depression - recurrent/severe  PHQ-9 Total Score: 15 (7/11/2018  9:14 AM)  Continue Seroquel/Remeron - continue work with therapist  3.  Vitamin D deficiency - will renew Vitamin D supplement  4.  Dysuria - check UA/UC - doubt significant infection.           Medications Discontinued During This Encounter   Medication Reason     QUEtiapine (SEROQUEL) 25 MG tablet Reorder     mirtazapine (REMERON) 30 MG tablet Reorder     cholecalciferol, vitamin D3, (VITAMIN D3) 2,000 unit Tab Reorder     There are no Patient Instructions on file for this visit.    Chief Complaint:  Chief Complaint   Patient presents with     Vaginal Itching     follow up       HPI:   Silvino Collado Say is a 38 y.o. female c/o  Has perineal itching - just can't get better  Apparently has been evaluated by doctors previously - using antifungal cream/?Fluconazole.      Poor sleep  Depressed  Seeing therapist    PMH:   Patient Active Problem List    Diagnosis Date Noted     Severe episode of recurrent major depressive  disorder, with psychotic features (H) 11/28/2017     PTSD (post-traumatic stress disorder) 11/28/2017     Hyperlipidemia 03/14/2017     Vitamin D deficiency 03/14/2017     Anemia 02/13/2017     Hepatitis B 02/13/2017     Severe major depression with psychotic features (H) 02/13/2017     Trouble in sleeping 01/20/2017     Past Medical History:   Diagnosis Date     Depression      H. pylori infection      Hepatitis B      Past Surgical History:   Procedure Laterality Date     LIPOMA RESECTION      left face     Social History     Social History     Marital status: Single     Spouse name: N/A     Number of children: N/A     Years of education: N/A     Occupational History     Not on file.     Social History Main Topics     Smoking status: Never Smoker     Smokeless tobacco: Current User      Comment: betle nut     Alcohol use No     Drug use: No     Sexual activity: No     Other Topics Concern     Not on file     Social History Narrative       Meds:    Current Outpatient Prescriptions:      cholecalciferol, vitamin D3, (VITAMIN D3) 2,000 unit Tab, Take 1 tablet (2,000 Units total) by mouth daily., Disp: 100 tablet, Rfl: 3     miconazole (MICATIN) 2 % cream, Apply to affected area 2 times daily, Disp: 15 g, Rfl: 1     mirtazapine (REMERON) 30 MG tablet, Take 1 tablet (30 mg total) by mouth at bedtime., Disp: 30 tablet, Rfl: 5     QUEtiapine (SEROQUEL) 25 MG tablet, Take 2 tablets (50 mg total) by mouth at bedtime., Disp: 30 tablet, Rfl: 1     hydrocortisone (ANUSOL-HC) 2.5 % rectal cream, Apply topically to affected area BID - no longer than 14 days, Disp: 30 g, Rfl: 1    Allergies:  Allergies   Allergen Reactions     Acetaminophen Dizziness       ROS:  Pertinent positives as noted in HPI; otherwise 12 point ROS negative.      Physical Exam:  EXAM:  BP 96/66 (Patient Site: Left Arm, Patient Position: Sitting, Cuff Size: Adult Small)  Pulse 72  Temp 98.2  F (36.8  C) (Oral)   Resp 20  Wt 113 lb (51.3 kg)  LMP  07/09/2018  BMI 21.35 kg/m2   Gen:  NAD, appears well, well-hydrated  HEENT:  TMs nl, oropharynx benign, nasal mucosa nl, conjunctiva clear  Neck:  Supple, no adenopathy, no thyromegaly, no carotid bruits, no JVD  Lungs:  Clear to auscultation bilaterally  Cor:  RRR no murmur  Abd:  Soft, nontender, BS+, no masses, no guarding or rebound, no HSM  PELVIC EXAM:External genitalia: normal  Vaginal mucosa normal  Vaginal discharge: sm amt white/yellow  Speculum exam shows a normal appearing cervix .   Bimanual exam: Cervix closed, firm, non tender  to motion.  Uterus  firm, regular, mobile, non tender to palpation. No adnexal masses or tenderness.   Extr:  Neg.  Neuro:  No asymmetry  Skin:  Warm/dry        Results:  Results for orders placed or performed in visit on 08/07/18   Wet Prep, Vaginal   Result Value Ref Range    Yeast Result No yeast seen No yeast seen    Trichomonas No Trichomonas seen No Trichomonas seen    Clue Cells, Wet Prep No Clue cells seen No Clue cells seen   Chlamydia trachomatis & Neisseria gonorrhoeae, Amplified Detection   Result Value Ref Range    Chlamydia trachomatis, Amplified Detection Negative Negative    Neisseria gonorrhoeae, Amplified Detection Negative Negative   Culture, Urine   Result Value Ref Range    Culture Mixture of urogenital organisms    Urinalysis   Result Value Ref Range    Color, UA Yellow Colorless, Yellow, Straw, Light Yellow    Clarity, UA Clear Clear    Glucose, UA Negative Negative    Bilirubin, UA Negative Negative    Ketones, UA Negative Negative    Specific Gravity, UA >=1.030 1.005 - 1.030    Blood, UA Negative Negative    pH, UA 5.5 5.0 - 8.0    Protein, UA Negative Negative mg/dL    Urobilinogen, UA 0.2 E.U./dL 0.2 E.U./dL, 1.0 E.U./dL    Nitrite, UA Negative Negative    Leukocytes, UA Negative Negative

## 2021-06-20 NOTE — LETTER
Letter by Maggie Rowe MD at      Author: Maggie Rowe MD Service: -- Author Type: --    Filed:  Encounter Date: 9/14/2020 Status: (Other)         Silvino Collado Say  [address]  [place] [address]             September 14, 2020         Dear MsJosey Say,    We are happy to inform you that your recent Pap smear and Human Papillomavirus (HPV) test results are normal and negative.    It is recommended that you have your next Pap smear and Human Papillomavirus (HPV) test in 5 years. You will also need to return to the clinic every year for an annual wellness visit.    If you have additional questions regarding this result, please contact our office and we will be happy to assist you.      Sincerely,    [facility]//[facility]

## 2021-06-22 NOTE — PROGRESS NOTES
"ASSESSMENT/PLAN:  1. Pharyngitis  Rapid Strep A Screen- Throat Swab   2. Vitamin D deficiency  cholecalciferol, vitamin D3, (VITAMIN D3) 2,000 unit Tab   3. Need for immunization against influenza  Influenza, Seasonal Quad, Preservative Free 36+ Months    CANCELED: Influenza, Seasonal,Quad Inj, 36+ MOS   4. Strep throat  amoxicillin (AMOXIL) 500 MG capsule       This is a 37 yo female with itchy throat, frequent cough - here for:  1.  Strep throat - rapid strep is positive = will treat for strep with Amoxicillin at guideline dosin mg po qday.  Discussed.  RTC 2 weeks if no improvement.    2.  Vitamin D deficiency - desires refill on her supplement.  3.  Due for seasonal influenza vaccine - given today.      Return in about 2 weeks (around 2019).      Medications Discontinued During This Encounter   Medication Reason     cholecalciferol, vitamin D3, (VITAMIN D3) 2,000 unit Tab Reorder     There are no Patient Instructions on file for this visit.    Chief Complaint:  Chief Complaint   Patient presents with     Cough     x 1 week     Itchy Throat     x 2 weeks     Itchy Eyes     x 2 weeks       HPI:   Silvino Caceres is a 38 y.o. female c/o  Here for \"sick\" x 1-1/2 weeks  Sore throat, itchy throat, itchy eyes, cough - non productive  No fever  No pain across her face except for acne  Doesn't use medication for her acne  Grandma is sick at home; aunt has similar symptoms    Couple months ago had UTI symptoms  That has improved          PMH:   Patient Active Problem List    Diagnosis Date Noted     Severe episode of recurrent major depressive disorder, with psychotic features (H) 2017     PTSD (post-traumatic stress disorder) 2017     Hyperlipidemia 2017     Vitamin D deficiency 2017     Anemia 2017     Hepatitis B 2017     Severe major depression with psychotic features (H) 2017     Trouble in sleeping 2017     Past Medical History:   Diagnosis Date     Depression "      H. pylori infection      Hepatitis B      Past Surgical History:   Procedure Laterality Date     LIPOMA RESECTION      left face     Social History     Socioeconomic History     Marital status: Single     Spouse name: Not on file     Number of children: Not on file     Years of education: Not on file     Highest education level: Not on file   Social Needs     Financial resource strain: Not on file     Food insecurity - worry: Not on file     Food insecurity - inability: Not on file     Transportation needs - medical: Not on file     Transportation needs - non-medical: Not on file   Occupational History     Not on file   Tobacco Use     Smoking status: Never Smoker     Smokeless tobacco: Current User     Tobacco comment: betle nut   Substance and Sexual Activity     Alcohol use: No     Drug use: No     Sexual activity: No   Other Topics Concern     Not on file   Social History Narrative     Not on file     Family History   Problem Relation Age of Onset     No Medical Problems Mother        Meds:    Current Outpatient Medications:      cholecalciferol, vitamin D3, (VITAMIN D3) 2,000 unit Tab, Take 1 tablet (2,000 Units total) by mouth daily., Disp: 100 tablet, Rfl: 1     hydrocortisone (ANUSOL-HC) 2.5 % rectal cream, Apply topically to affected area BID - no longer than 14 days, Disp: 30 g, Rfl: 1     mirtazapine (REMERON) 30 MG tablet, Take 1 tablet (30 mg total) by mouth at bedtime., Disp: 30 tablet, Rfl: 5     QUEtiapine (SEROQUEL) 25 MG tablet, Take 2 tablets (50 mg total) by mouth at bedtime., Disp: 30 tablet, Rfl: 1     amoxicillin (AMOXIL) 500 MG capsule, Take 2 capsules (1,000 mg total) by mouth daily for 10 days., Disp: 20 capsule, Rfl: 0     miconazole (MICATIN) 2 % cream, Apply to affected area 2 times daily, Disp: 15 g, Rfl: 1    Allergies:  Allergies   Allergen Reactions     Acetaminophen Dizziness       ROS:  Pertinent positives as noted in HPI; otherwise 12 point ROS negative.      Physical  Exam:  EXAM:  /74   Pulse 72   Temp 97.9  F (36.6  C) (Oral)   Wt 119 lb 8 oz (54.2 kg)   SpO2 100%   BMI 22.58 kg/m     Gen:  NAD, appears well, well-hydrated  HEENT:  TMs nl, oropharynx mild erythema, nasal mucosa nl, conjunctiva clear, EOMI  Neck:  Supple, no adenopathy, no thyromegaly, no carotid bruits, no JVD  Lungs:  Clear to auscultation bilaterally, dry persistent cough  Cor:  RRR no murmur  Abd:  Soft, nontender, BS+, no masses, no guarding or rebound, no HSM  Extr:  Neg.  Neuro:  No asymmetry  Skin:  Warm/dry        Results:  Results for orders placed or performed in visit on 12/28/18   Rapid Strep A Screen- Throat Swab   Result Value Ref Range    Rapid Strep A Antigen Group A Strep detected (!) No Group A Strep detected, presumptive negative

## 2021-06-23 NOTE — TELEPHONE ENCOUNTER
Kiera from [facility] lab calling to report Pt has   Positive group A strep throat - Ty.  Saw Dr Brooks in clinic  @ [facility] yesterday.     Writer paged on -call - Dr Cervantes to update lab results.  Dr Cervantes prescribed amoxicillin 500 mg po, two times a day x 10 days.    Writer called  line and had Yareli  call Pt. To update regarding positive strep throat results.  Also updated Pt that an abx has been sent to [facility].  Pt states she has transportation to pharmacy this miranda to  medication.  Rose Mary Hooks, RN, [facility] RN Triage/Med Refills        Single Mechanical/Accidental Fall

## 2021-06-23 NOTE — PROGRESS NOTES
"  Chief Complaint   Patient presents with     Allergic Reaction     Itchiness on throat, face eyes and head - possible allergic reaction, unsure      Dental Pain         HPI:   Silvino Caceres is a 38 y.o. female with  c/o itchy throat for the last week.  Not every day.  No sore throat.  Chews betel nut.  No fever.  No ear problems.  No stuffy nose.  No post nasal drainage.  No cough.  No stomach problems.  No vomiting or diarrhea.  Eyes have been itching too. Started three days ago. No watering.  No medication taken.    Treated for strep in December. Similar symptoms.    No known exposures.      ROS:  A 10 point comprehensive review of systems was negative except as noted.     Medications:  Current Outpatient Medications on File Prior to Visit   Medication Sig Dispense Refill     cholecalciferol, vitamin D3, (VITAMIN D3) 2,000 unit Tab Take 1 tablet (2,000 Units total) by mouth daily. 100 tablet 1     mirtazapine (REMERON) 30 MG tablet Take 1 tablet (30 mg total) by mouth at bedtime. 30 tablet 5     QUEtiapine (SEROQUEL) 25 MG tablet Take 2 tablets (50 mg total) by mouth at bedtime. 30 tablet 1     hydrocortisone (ANUSOL-HC) 2.5 % rectal cream Apply topically to affected area BID - no longer than 14 days 30 g 1     miconazole (MICATIN) 2 % cream Apply to affected area 2 times daily 15 g 1     No current facility-administered medications on file prior to visit.          Social History:  Social History     Tobacco Use     Smoking status: Never Smoker     Smokeless tobacco: Current User     Tobacco comment: betle nut   Substance Use Topics     Alcohol use: No         Physical Exam:   Vitals:    02/08/19 1440   BP: 100/60   Pulse: 74   Resp: 16   Temp: 97.7  F (36.5  C)   TempSrc: Oral   SpO2: 99%   Weight: 124 lb (56.2 kg)   Height: 5' 1\" (1.549 m)       GENERAL:   Alert. Oriented.  EYES: Clear  HENT:  Ears: R TM pearly gray. Normal landmarks. L TM pearly gray.  Normal landmarks  Nose: Clear.  Sinuses: " Nontender.  Oropharynx:  No erythema. No exudate.  NECK: Supple. No adenopathy.  LUNGS: Clear to ascultation.  No crackles.  No wheezing  HEART: RRR  SKIN:  No rash.       LABS:  Results for orders placed or performed in visit on 02/08/19   Rapid Strep A Screen-Throat   Result Value Ref Range    Rapid Strep A Antigen No Group A Strep detected, presumptive negative No Group A Strep detected, presumptive negative        Assessment/Plan:    1. Sore throat  Rapid Strep A Screen-Throat    Group A Strep, RNA Direct Detection, Throat   2. Itching  diphenhydrAMINE (BENADRYL) 25 mg capsule      Benadryl for itching.  Recheck for problems.          Tennille Brooks MD      2/8/2019    The following portions of the patient's history were reviewed and updated as appropriate: allergies, current medications, past family history, past medical history, past social history, past surgical history and problem list.

## 2021-06-27 NOTE — PROGRESS NOTES
Progress Notes by Ernesto Crawley LGSW at 7/17/2019  9:00 AM     Author: Ernesto Crawley LGSW Service: -- Author Type:     Filed: 7/17/2019  9:47 AM Encounter Date: 7/17/2019 Status: Signed    : Ernesto Crawley LGSW ()       Clinic Care Coordination Contact  BARB met with Silvino Collado and . SW reviewed concerns listed in CHW outreach encounter. Patient reported they do not need waiver services or PCA services. SW educated patient on ARM services and they reported they do not want this at this time.    Silvino Collado reported she needs food support, she is receiving it currently. She is getting $15 per month, but she reported her house dropped to 1 hour 40 minutes per day. BARB explained FRG referral.    Clinic Care Coordination Contact  OUTREACH    Referral Information:       Primary Diagnosis: Psychosocial    Chief Complaint   Patient presents with   ? Clinic Care Coordination - Follow-up   ? Clinic Care Coodination - Face To Face        Universal Utilization:   Clinic Utilization  Difficulty keeping appointments:: No  Compliance Concerns: No  No-Show Concerns: No  No PCP office visit in Past Year: No  Utilization    Last refreshed: 7/17/2019  9:26 AM:  Hospital Admissions 0           Last refreshed: 7/17/2019  9:26 AM:  ED Visits 0           Last refreshed: 7/17/2019  9:26 AM:  No Show Count (past year) 0              Current as of: 7/17/2019  9:26 AM              Clinical Concerns:  Current Medical Concerns:  None reported    Current Behavioral Concerns: Mental health, seeing therapist, declined ARMHS referral today    Education Provided to patient: Educated patient on ARM services and FRG referral   Pain  Pain (GOAL):: No  Health Maintenance Reviewed: Up to date  Clinical Pathway: None     Medication Management:  N/A     Functional Status:       Living Situation:       Diet/Exercise/Sleep:  Inadequate nutrition (GOAL):: No  Food Insecurity: Yes  In the last twelve  months: We worried whether food would run out before we had money to buy more.: Don't Know  In the last twelve months: The food we bought just didn't last and we didn't have money to buy more.: Don't Know  Tube Feeding: No  Exercise:: Currently not exercising  Inadequate activity/exercise (GOAL):: No  Significant changes in sleep pattern (GOAL): No    Transportation:  Transportation concerns (GOAL):: No        Psychosocial:        Financial/Insurance:   Financial/Insurance concerns (GOAL):: Yes  Ten would like food support     Resources and Interventions:  Current Resources:    ;                         Goals:   Goals        General    Financial Wellbeing (pt-stated)     Notes - Note created  7/17/2019  9:37 AM by Ernesto Crawley LGSW    Goal Statement- I want to know if I'm eligible for food support within one month    Action steps to achieve this goal  1.  [facility] SW will place referral to FRG to review my information  2.  I Will provide necessary documentation to complete application if I'm eligible       Date goal set:  7/17/2019 BC                  Patient/Caregiver understanding:            Plan: SW placed FRG referral

## 2021-07-03 NOTE — ADDENDUM NOTE
Addendum Note by Damon Rowe MD at 1/19/2018 11:28 AM     Author: Damon Rowe MD Service: -- Author Type: Physician    Filed: 1/19/2018 11:28 AM Encounter Date: 1/19/2018 Status: Signed    : Damon Rowe MD (Physician)    Addended by: DAMON ROWE on: 1/19/2018 11:28 AM        Modules accepted: Orders

## 2021-08-18 ENCOUNTER — OFFICE VISIT (OUTPATIENT)
Dept: FAMILY MEDICINE | Facility: CLINIC | Age: 41
End: 2021-08-18
Payer: COMMERCIAL

## 2021-08-18 VITALS
BODY MASS INDEX: 22.6 KG/M2 | HEART RATE: 66 BPM | TEMPERATURE: 97.5 F | OXYGEN SATURATION: 98 % | WEIGHT: 119.7 LBS | SYSTOLIC BLOOD PRESSURE: 100 MMHG | DIASTOLIC BLOOD PRESSURE: 70 MMHG | RESPIRATION RATE: 14 BRPM | HEIGHT: 61 IN

## 2021-08-18 DIAGNOSIS — Z11.1 TUBERCULOSIS SCREENING: Primary | ICD-10-CM

## 2021-08-18 PROCEDURE — 86481 TB AG RESPONSE T-CELL SUSP: CPT | Performed by: FAMILY MEDICINE

## 2021-08-18 PROCEDURE — 99213 OFFICE O/P EST LOW 20 MIN: CPT | Performed by: FAMILY MEDICINE

## 2021-08-18 PROCEDURE — 36415 COLL VENOUS BLD VENIPUNCTURE: CPT | Performed by: FAMILY MEDICINE

## 2021-08-18 ASSESSMENT — MIFFLIN-ST. JEOR: SCORE: 1137.59

## 2021-08-18 NOTE — LETTER
August 19, 2021      Ten Nayare[address] Morris  [address] [place] [address]        Dear ,      TB test was negative.    Sincerely,      Tennille Brooks MD

## 2021-08-18 NOTE — PROGRESS NOTES
"CC:  Patient presents with:  TB test       HPI:  Silvino Caceres is a 41 year old female requesting screening for TB.  Needed for work as PCA  History of TB/Positive TB test:  no  History of BCG: no  TB Skin test in the past 12 months: No  History of treatment for TB--active or latent: no  Live virus vaccination in last 6 weeks: No  Fever/Chills: no  Weight loss/Poor appetite:  no  Night Sweats: no  Unusual Fatigue: No  Cough (> 3 weeks): no  Coughing up Blood: no  Chest Pain: no      ROS:  A 12 point comprehensive review of systems was negative except as noted.     EXAM:  Vitals:    08/18/21 0959   BP: 100/70   Pulse: 66   Resp: 14   Temp: 97.5  F (36.4  C)   TempSrc: Oral   SpO2: 98%   Weight: 54.3 kg (119 lb 11.2 oz)   Height: 1.545 m (5' 0.83\")         GEN:  NAD  LUNGS:  Clear to auscultation without wheezing.  Normal effort.  HEART:  RRR without murmur, rub or gallop       A/P:      Tuberculosis screening  *No  Signs or symptoms of active TB  - Quantiferon-TB Gold Plus  - Quantiferon-TB Gold Plus            Tennille Brooks MD     8/18/2021  "

## 2021-08-19 LAB
GAMMA INTERFERON BACKGROUND BLD IA-ACNC: 0.08 IU/ML
M TB IFN-G BLD-IMP: NEGATIVE
M TB IFN-G CD4+ BCKGRND COR BLD-ACNC: 9.92 IU/ML
MITOGEN IGNF BCKGRD COR BLD-ACNC: -0.02 IU/ML
MITOGEN IGNF BCKGRD COR BLD-ACNC: 0.02 IU/ML
QUANTIFERON MITOGEN: 10 IU/ML
QUANTIFERON NIL TUBE: 0.08 IU/ML
QUANTIFERON TB1 TUBE: 0.06 IU/ML
QUANTIFERON TB2 TUBE: 0.1

## 2021-10-04 ENCOUNTER — PATIENT OUTREACH (OUTPATIENT)
Dept: NURSING | Facility: CLINIC | Age: 41
End: 2021-10-04

## 2021-10-04 ENCOUNTER — OFFICE VISIT (OUTPATIENT)
Dept: FAMILY MEDICINE | Facility: CLINIC | Age: 41
End: 2021-10-04
Payer: COMMERCIAL

## 2021-10-04 VITALS
HEART RATE: 68 BPM | OXYGEN SATURATION: 98 % | TEMPERATURE: 98.9 F | RESPIRATION RATE: 16 BRPM | DIASTOLIC BLOOD PRESSURE: 68 MMHG | BODY MASS INDEX: 22.53 KG/M2 | SYSTOLIC BLOOD PRESSURE: 98 MMHG | WEIGHT: 118.56 LBS

## 2021-10-04 DIAGNOSIS — Z23 NEED FOR IMMUNIZATION AGAINST INFLUENZA: ICD-10-CM

## 2021-10-04 DIAGNOSIS — M25.50 ARTHRALGIA, UNSPECIFIED JOINT: ICD-10-CM

## 2021-10-04 DIAGNOSIS — Z00.00 ROUTINE GENERAL MEDICAL EXAMINATION AT A HEALTH CARE FACILITY: Primary | ICD-10-CM

## 2021-10-04 DIAGNOSIS — B18.1 CHRONIC VIRAL HEPATITIS B WITHOUT DELTA AGENT AND WITHOUT COMA (H): ICD-10-CM

## 2021-10-04 DIAGNOSIS — K21.9 GASTROESOPHAGEAL REFLUX DISEASE WITHOUT ESOPHAGITIS: ICD-10-CM

## 2021-10-04 DIAGNOSIS — R53.83 FATIGUE, UNSPECIFIED TYPE: ICD-10-CM

## 2021-10-04 DIAGNOSIS — B37.31 CANDIDIASIS OF VAGINA: ICD-10-CM

## 2021-10-04 DIAGNOSIS — R63.4 WEIGHT LOSS: ICD-10-CM

## 2021-10-04 DIAGNOSIS — H10.10 SEASONAL ALLERGIC CONJUNCTIVITIS: ICD-10-CM

## 2021-10-04 DIAGNOSIS — F33.3 SEVERE EPISODE OF RECURRENT MAJOR DEPRESSIVE DISORDER, WITH PSYCHOTIC FEATURES (H): ICD-10-CM

## 2021-10-04 DIAGNOSIS — F51.04 PSYCHOPHYSIOLOGICAL INSOMNIA: ICD-10-CM

## 2021-10-04 DIAGNOSIS — R41.3 MEMORY DEFICITS: ICD-10-CM

## 2021-10-04 LAB
ALBUMIN SERPL-MCNC: 3.8 G/DL (ref 3.5–5)
ALP SERPL-CCNC: 42 U/L (ref 45–120)
ALT SERPL W P-5'-P-CCNC: 9 U/L (ref 0–45)
ANION GAP SERPL CALCULATED.3IONS-SCNC: 12 MMOL/L (ref 5–18)
AST SERPL W P-5'-P-CCNC: 14 U/L (ref 0–40)
BASOPHILS # BLD AUTO: 0 10E3/UL (ref 0–0.2)
BASOPHILS NFR BLD AUTO: 1 %
BILIRUB SERPL-MCNC: 0.2 MG/DL (ref 0–1)
BUN SERPL-MCNC: 14 MG/DL (ref 8–22)
CALCIUM SERPL-MCNC: 9.3 MG/DL (ref 8.5–10.5)
CHLORIDE BLD-SCNC: 106 MMOL/L (ref 98–107)
CO2 SERPL-SCNC: 20 MMOL/L (ref 22–31)
CREAT SERPL-MCNC: 0.86 MG/DL (ref 0.6–1.1)
EOSINOPHIL # BLD AUTO: 0.2 10E3/UL (ref 0–0.7)
EOSINOPHIL NFR BLD AUTO: 2 %
ERYTHROCYTE [DISTWIDTH] IN BLOOD BY AUTOMATED COUNT: 13.3 % (ref 10–15)
GFR SERPL CREATININE-BSD FRML MDRD: 84 ML/MIN/1.73M2
GLUCOSE BLD-MCNC: 87 MG/DL (ref 70–125)
HCT VFR BLD AUTO: 40.1 % (ref 35–47)
HGB BLD-MCNC: 13.3 G/DL (ref 11.7–15.7)
IMM GRANULOCYTES # BLD: 0 10E3/UL
IMM GRANULOCYTES NFR BLD: 0 %
LYMPHOCYTES # BLD AUTO: 2.1 10E3/UL (ref 0.8–5.3)
LYMPHOCYTES NFR BLD AUTO: 29 %
MCH RBC QN AUTO: 30.2 PG (ref 26.5–33)
MCHC RBC AUTO-ENTMCNC: 33.2 G/DL (ref 31.5–36.5)
MCV RBC AUTO: 91 FL (ref 78–100)
MONOCYTES # BLD AUTO: 0.7 10E3/UL (ref 0–1.3)
MONOCYTES NFR BLD AUTO: 9 %
NEUTROPHILS # BLD AUTO: 4.4 10E3/UL (ref 1.6–8.3)
NEUTROPHILS NFR BLD AUTO: 59 %
PLATELET # BLD AUTO: 192 10E3/UL (ref 150–450)
POTASSIUM BLD-SCNC: 3.5 MMOL/L (ref 3.5–5)
PROT SERPL-MCNC: 7.7 G/DL (ref 6–8)
RBC # BLD AUTO: 4.4 10E6/UL (ref 3.8–5.2)
SODIUM SERPL-SCNC: 138 MMOL/L (ref 136–145)
WBC # BLD AUTO: 7.4 10E3/UL (ref 4–11)

## 2021-10-04 PROCEDURE — 36415 COLL VENOUS BLD VENIPUNCTURE: CPT | Performed by: FAMILY MEDICINE

## 2021-10-04 PROCEDURE — 85025 COMPLETE CBC W/AUTO DIFF WBC: CPT | Performed by: FAMILY MEDICINE

## 2021-10-04 PROCEDURE — 90471 IMMUNIZATION ADMIN: CPT | Performed by: FAMILY MEDICINE

## 2021-10-04 PROCEDURE — 90686 IIV4 VACC NO PRSV 0.5 ML IM: CPT | Performed by: FAMILY MEDICINE

## 2021-10-04 PROCEDURE — 99213 OFFICE O/P EST LOW 20 MIN: CPT | Mod: 25 | Performed by: FAMILY MEDICINE

## 2021-10-04 PROCEDURE — 87517 HEPATITIS B DNA QUANT: CPT | Performed by: FAMILY MEDICINE

## 2021-10-04 PROCEDURE — 80053 COMPREHEN METABOLIC PANEL: CPT | Performed by: FAMILY MEDICINE

## 2021-10-04 PROCEDURE — 99396 PREV VISIT EST AGE 40-64: CPT | Mod: 25 | Performed by: FAMILY MEDICINE

## 2021-10-04 RX ORDER — FLUCONAZOLE 150 MG/1
150 TABLET ORAL ONCE
Qty: 1 TABLET | Refills: 0 | Status: SHIPPED | OUTPATIENT
Start: 2021-10-04 | End: 2021-10-04

## 2021-10-04 RX ORDER — ACETAMINOPHEN 500 MG
500 TABLET ORAL EVERY 6 HOURS PRN
Qty: 100 TABLET | Refills: 0 | Status: SHIPPED | OUTPATIENT
Start: 2021-10-04 | End: 2022-03-31

## 2021-10-04 RX ORDER — CETIRIZINE HYDROCHLORIDE 10 MG/1
10 TABLET ORAL DAILY
Qty: 30 TABLET | Refills: 2 | Status: SHIPPED | OUTPATIENT
Start: 2021-10-04 | End: 2022-03-31

## 2021-10-04 RX ORDER — PANTOPRAZOLE SODIUM 20 MG/1
20 TABLET, DELAYED RELEASE ORAL DAILY
Qty: 90 TABLET | Refills: 1 | Status: SHIPPED | OUTPATIENT
Start: 2021-10-04 | End: 2022-01-24

## 2021-10-04 ASSESSMENT — PATIENT HEALTH QUESTIONNAIRE - PHQ9: SUM OF ALL RESPONSES TO PHQ QUESTIONS 1-9: 5

## 2021-10-04 NOTE — PROGRESS NOTES
SUBJECTIVE:   CC: Silvino Caceres is an 41 year old woman who presents for preventive health visit.       Patient has been advised of split billing requirements and indicates understanding: Yes  Healthy Habits:     Getting at least 3 servings of Calcium per day:  Yes    Bi-annual eye exam:  NO    Dental care twice a year:  NO    Sleep apnea or symptoms of sleep apnea:  None    Diet:  Regular (no restrictions)    Frequency of exercise:  1 day/week    Duration of exercise:  N/A    Taking medications regularly:  No    Medication side effects:  None    PHQ-2 Total Score: 2    Additional concerns today:  Yes      Poor appetite.  Patient is inconsistent with her answers.   Stomach feels bloated and she just does not want to eat.   She says this is something new, has never happened before.   Just bloating, no pain. It comes and goes.   On chart review, she has had h pylori in the past, unclear if she has been treated.     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    She is extremely forgetful. She has seen a psychiatrist in the past but it looks like not since 4/2020. She does have a diagnosis of PTSD and persistent depressive disorder, no mention of memory deficits. Patient seemed to struggle with basic questions or answers inconsistently. She says she doesn't know if she is able to work, I asked if she has ever been on disability and she says she has to keep working.  has to repeat and rephrase several times. She use          Today's PHQ-2 Score:   PHQ-2 ( 1999 [employer]) 10/4/2021   Q1: Little interest or pleasure in doing things 1   Q2: Feeling down, depressed or hopeless 1   PHQ-2 Score 2   Q1: Little interest or pleasure in doing things Several days   Q2: Feeling down, depressed or hopeless Several days   PHQ-2 Score 2       Abuse: Current or Past (Physical, Sexual or Emotional) - No  Do you feel safe in your environment? Yes    Have you ever done Advance Care Planning? (For example, a Health  Directive, POLST, or a discussion with a medical provider or your loved ones about your wishes): No, advance care planning information given to patient to review.  Patient plans to discuss their wishes with loved ones or provider.      Social History     Tobacco Use     Smoking status: Never Smoker     Smokeless tobacco: Current User     Tobacco comment: betle nut   Substance Use Topics     Alcohol use: No     If you drink alcohol do you typically have >3 drinks per day or >7 drinks per week? Not applicable    Alcohol Use 10/4/2021   Prescreen: >3 drinks/day or >7 drinks/week? No   Prescreen: >3 drinks/day or >7 drinks/week? -       Reviewed orders with patient.  Reviewed health maintenance and updated orders accordingly - Yes  Lab work is in process    Breast Cancer Screening:  Any new diagnosis of family breast, ovarian, or bowel cancer? No    Mammogram Screening - Offered annual screening and updated Health Maintenance for mutual plan based on risk factor consideration   and Mammogram Screening - Mammography discussed and declined  Pertinent mammograms are reviewed under the imaging tab.    History of abnormal Pap smear: NO - age 30-65 PAP every 5 years with negative HPV co-testing recommended    PAP / HPV Latest Ref Rng & Units 9/1/2020   PAP Negative for squamous intraepithelial lesion or malignancy. Negative for squamous intraepithelial lesion or malignancy  Electronically signed by Marilyn Cassidy CT (ASCP) on 9/11/2020 at  1:46 PM     HPV16 NEG Negative   HPV18 NEG Negative   HRHPV NEG Negative     Reviewed and updated as needed this visit by clinical staff  Tobacco  Allergies  Meds  Problems  Med Hx  Surg Hx  Fam Hx          Reviewed and updated as needed this visit by Provider  Tobacco  Allergies  Meds  Problems  Med Hx  Surg Hx  Fam Hx         Past Medical History:   Diagnosis Date     Depression      H. pylori infection      Hepatitis B       Past Surgical History:   Procedure Laterality  Date     INSERT INTRACORONARY STENT      left face     OB History   No obstetric history on file.       Review of Systems  CONSTITUTIONAL: NEGATIVE for fever, chills, change in weight  INTEGUMENTARU/SKIN: NEGATIVE for worrisome rashes, moles or lesions  EYES: NEGATIVE for vision changes or irritation  ENT: NEGATIVE for ear, mouth and throat problems  RESP: NEGATIVE for significant cough or SOB  BREAST: NEGATIVE for masses, tenderness or discharge  CV: NEGATIVE for chest pain, palpitations or peripheral edema  GI: NEGATIVE for nausea, abdominal pain, heartburn, or change in bowel habits  : Periods are regular. Vaginal itching.   MUSCULOSKELETAL: NEGATIVE for significant arthralgias or myalgia  NEURO: NEGATIVE for weakness, dizziness or paresthesias  PSYCHIATRIC: NEGATIVE for changes in mood or affect     OBJECTIVE:   BP 98/68   Pulse 68   Temp 98.9  F (37.2  C) (Oral)   Resp 16   Wt 53.8 kg (118 lb 9 oz)   LMP 09/19/2021   SpO2 98%   BMI 22.53 kg/m    Physical Exam  GENERAL: healthy, alert and no distress  EYES: Eyes grossly normal to inspection, PERRL and conjunctivae and sclerae normal  HENT: ear canals and TM's normal, nose and mouth without ulcers or lesions  NECK: no adenopathy, no asymmetry, masses, or scars and thyroid normal to palpation  RESP: lungs clear to auscultation - no rales, rhonchi or wheezes  BREAST: normal without masses, tenderness or nipple discharge and no palpable axillary masses or adenopathy  CV: regular rate and rhythm, normal S1 S2, no S3 or S4, no murmur, click or rub, no peripheral edema and peripheral pulses strong  ABDOMEN: soft, nontender, no hepatosplenomegaly, no masses and bowel sounds normal  MS: no gross musculoskeletal defects noted, no edema  SKIN: no suspicious lesions or rashes. Baseline hyperpigmentation in face.   NEURO: Normal strength and tone, mentation intact and speech normal  PSYCH: mentation appears normal, affect normal/bright    Diagnostic Test  Results:  Labs reviewed in Epic  Results for orders placed or performed in visit on 10/04/21 (from the past 24 hour(s))   CBC with platelets and differential    Narrative    The following orders were created for panel order CBC with platelets and differential.  Procedure                               Abnormality         Status                     ---------                               -----------         ------                     CBC with platelets and d...[[id number]]                      Final result                 Please view results for these tests on the individual orders.   CBC with platelets and differential   Result Value Ref Range    WBC Count 7.4 4.0 - 11.0 10e3/uL    RBC Count 4.40 3.80 - 5.20 10e6/uL    Hemoglobin 13.3 11.7 - 15.7 g/dL    Hematocrit 40.1 35.0 - 47.0 %    MCV 91 78 - 100 fL    MCH 30.2 26.5 - 33.0 pg    MCHC 33.2 31.5 - 36.5 g/dL    RDW 13.3 10.0 - 15.0 %    Platelet Count 192 150 - 450 10e3/uL    % Neutrophils 59 %    % Lymphocytes 29 %    % Monocytes 9 %    % Eosinophils 2 %    % Basophils 1 %    % Immature Granulocytes 0 %    Absolute Neutrophils 4.4 1.6 - 8.3 10e3/uL    Absolute Lymphocytes 2.1 0.8 - 5.3 10e3/uL    Absolute Monocytes 0.7 0.0 - 1.3 10e3/uL    Absolute Eosinophils 0.2 0.0 - 0.7 10e3/uL    Absolute Basophils 0.0 0.0 - 0.2 10e3/uL    Absolute Immature Granulocytes 0.0 <=0.0 10e3/uL       ASSESSMENT/PLAN:   Silvino was seen today for physical.    Diagnoses and all orders for this visit:    Routine general medical examination at a health care [facility]  Health maintenance otherwise up to date.     Gastroesophageal reflux disease without esophagitis  -     pantoprazole (PROTONIX) 20 MG EC tablet; Take 1 tablet (20 mg) by mouth daily    Seasonal allergic conjunctivitis  -     cetirizine (ZYRTEC) 10 MG tablet; Take 1 tablet (10 mg) by mouth daily    Candidiasis of vagina  -     fluconazole (DIFLUCAN) 150 MG tablet; Take 1 tablet (150 mg) by mouth once for 1 dose    Arthralgia,  unspecified joint  -     acetaminophen (TYLENOL) 500 MG tablet; Take 1 tablet (500 mg) by mouth every 6 hours as needed    Memory deficits  See subjective. Patient was difficult to understand even for several different interpreters over the past few months. Will connect her with care coordination to see if we can determine what she needs (if anything). Patient says she is concerned about her memory, but unable to tell me how it interferes with her life.   -     Other Specialty Referral; Future  -     Care Coordination Referral; Future     Chronic viral hepatitis B without delta agent and without coma (H)  Previous notes say followed by [facility] but has not been seen by them since 2017. Will check labs today.   -     Comprehensive metabolic panel (BMP + Alb, Alk Phos, ALT, AST, Total. Bili, TP)  -     Hep B Virus DNA Quant Real Time PCR    Weight loss  Fatigue, unspecified type  History of hpylori, unable to determine if treated recently? Patient has stomach pain that prevents her from eating but unable to tell me if any medications helped with the pain. Fluctuating weight over the past few years, no significant steady weight loss.   -     CBC with platelets and differential  -     Other Specialty Referral; Future  -     Care Coordination Referral; Future  -     Helicobacter pylori Antigen Stool; Future    Severe episode of recurrent major depressive disorder, with psychotic features (H)  Psychophysiological insomnia  -     amitriptyline (ELAVIL) 25 MG tablet; Take 1 tablet (25 mg) by mouth nightly as needed for sleep  -     Other Specialty Referral; Future  -     Care Coordination Referral; Future      Need for immunization against influenza  -     INFLUENZA VACCINE IM >6 MO VALENT IIV4 (ALFURIA/FLUZONE)        Patient has been advised of split billing requirements and indicates understanding: Yes  COUNSELING:  Reviewed preventive health counseling, as reflected in patient instructions       Regular exercise        "Healthy diet/nutrition       Vision screening       Advance Care Planning    Estimated body mass index is 22.53 kg/m  as calculated from the following:    Height as of 8/18/21: 1.545 m (5' 0.83\").    Weight as of this encounter: 53.8 kg (118 lb 9 oz).        She reports that she has never smoked. She uses smokeless tobacco.  Tobacco Cessation Action Plan:   Information offered: Patient not interested at this time        John Perales MD  [facility]  "

## 2021-10-04 NOTE — PROGRESS NOTES
Clinic Care Coordination Contact  Community Health Worker Initial Outreach    CHW Initial Information Gathering:  Referral Source: PCP  Preferred Hospital: [facility]  [phone]  Preferred Urgent Care: [facility] [place], [phone]  Current living arrangement:: I live in a private home with family  Type of residence:: Private home - stairs  Community Resources: None  Supplies used at home:: None  Equipment Currently Used at Home: none  Informal Support system:: Family  No PCP office visit in Past Year: No  Transportation means:: Accessible car, Family  CHW Additional Questions  If ED/Hospital discharge, follow-up appointment scheduled as recommended?: N/A  Medication changes made following ED/Hospital discharge?: N/A  MyChart active?: No  Patient agreeable to assistance with activating MyChart?: No    Patient accepts CC: Yes. Patient scheduled for assessment with CCC RN on Tuesday 10/12/2021 at 1pm. Patient noted desire to discuss ARMHS services, and other possible supports needed.

## 2021-10-05 ENCOUNTER — MEDICAL CORRESPONDENCE (OUTPATIENT)
Dept: HEALTH INFORMATION MANAGEMENT | Facility: CLINIC | Age: 41
End: 2021-10-05

## 2021-10-05 ENCOUNTER — APPOINTMENT (OUTPATIENT)
Dept: LAB | Facility: CLINIC | Age: 41
End: 2021-10-05
Payer: COMMERCIAL

## 2021-10-05 LAB
HBV DNA SERPL NAA+PROBE-ACNC: 479 IU/ML
HBV DNA SERPL NAA+PROBE-LOG IU: 2.7 {LOG_IU}/ML

## 2021-10-05 PROCEDURE — 87338 HPYLORI STOOL AG IA: CPT | Performed by: FAMILY MEDICINE

## 2021-10-07 LAB — H PYLORI AG STL QL IA: NEGATIVE

## 2021-10-12 ENCOUNTER — PATIENT OUTREACH (OUTPATIENT)
Dept: NURSING | Facility: CLINIC | Age: 41
End: 2021-10-12
Payer: COMMERCIAL

## 2021-10-12 DIAGNOSIS — R41.3 MEMORY DEFICITS: ICD-10-CM

## 2021-10-12 DIAGNOSIS — R53.83 FATIGUE, UNSPECIFIED TYPE: ICD-10-CM

## 2021-10-12 DIAGNOSIS — F33.3 SEVERE EPISODE OF RECURRENT MAJOR DEPRESSIVE DISORDER, WITH PSYCHOTIC FEATURES (H): ICD-10-CM

## 2021-10-12 ASSESSMENT — ACTIVITIES OF DAILY LIVING (ADL): DEPENDENT_IADLS:: INDEPENDENT

## 2021-10-12 NOTE — LETTER
[facility]  Patient Centered Plan of Care  About Me:        Patient Name:  Sivlino Caceres    YOB: 1980  Age:         41 year old   Yordan MRN:    [id number] Telephone Information:  Home Phone [phone]   Mobile [phone]       Address:  [address]  [place] [address] Email address:  No e-mail address on record      Emergency Contact(s)    Name Relationship Lgl Grd Work Phone Home Phone Mobile Phone   1. JAQUI ARCE Aunt    [phone]           Primary language:  Yareli     needed? Yes   [facility] [facility]:  [phone] op. 1  Other communication barriers:Language barrier;Lack of coping    Preferred Method of Communication:     Current living arrangement: I live in a private home with family    Mobility Status/ Medical Equipment: Independent        Health Maintenance  Health Maintenance Reviewed: No data recorded    My Access Plan  Medical Emergency 911   Primary Clinic Line [facility] - [phone]   24 Hour Appointment Line [phone] or  [phone]) (toll-free)   24 Hour Nurse Line [phone] (toll-free)   Preferred Urgent Care [facility] [phone]     [facility]  [phone]     Preferred Pharmacy [facility] - [address]     Behavioral Health Crisis Line The National Suicide Prevention Lifeline at 1-177.751.6071 or 911             My Care Team Members  Patient Care Team       Relationship Specialty Notifications Start End    John Perales MD PCP - General Family Medicine Abnormal results only, Admissions 9/2/21     Phone: [phone] Fax: [phone]         [address] [place] [address]    John Perales MD Assigned PCP   6/16/21     Phone: [phone] Fax: [phone]         [address] [place] [address]    Akua Springer RN Lead Care Coordinator Primary Care - CC Admissions  10/12/21             My Care Plans  Self Management and Treatment Plan  Goals and (Comments)  Goals        General     Mental Health Management (pt-stated)      Notes - Note created  11/1/2021  6:00 AM by Akua Springer RN        Goal statement: I will complete Neuropsych evaluation in the next 90 days.     Date goal set: 10/12/2021  Barriers: language barrier  Strengths: Agreeable to referral to [facility]  Date to achieve by: 1/122022  Patient expressed understanding of goal: Yes     Action steps to achieve this goal:   1.  I will answer my phone when I am contacted by [facility] to schedule my evaluation.   2.  I will attend my appointment with [facility] as scheduled on TBD.   3.  I will follow up with CCC in the next month regarding this goal.     Note: Referral to [facility] sent on 10/4/2021                  Action Plans on File:                       Advance Care Plans/Directives Type:   No data recorded    My Medical and Care Information  Problem List   Patient Active Problem List   Diagnosis     Insomnia, unspecified type     Anemia     Chronic viral hepatitis B without delta agent and without coma (H)     Hyperlipidemia     Vitamin D deficiency     Severe episode of recurrent major depressive disorder, with psychotic features (H)     PTSD (post-traumatic stress disorder)     Persistent depressive disorder with anxious distress, currently moderate     Chronic pain of both shoulders     Gastroesophageal reflux disease without esophagitis     Itching      Current Medications and Allergies:  See printed Medication Report.    Care Coordination Start Date: 10/12/2021   Frequency of Care Coordination: 6 weeks     Form Last Updated: 11/01/2021

## 2021-10-12 NOTE — LETTER
[facility] CARE COORDINATION    October 15, 2021    Silvino N Say  [address] [place] [address]      Dear Hao,    I am a clinic care coordinator who works with Rodney Deutsch MD. I wanted to thank you for spending the time to talk with me.  Below is a description of clinic care coordination and how I can further assist you.      The clinic care coordination team is made up of a registered nurse,  and community health worker who understand the health care system. The goal of clinic care coordination is to help you manage your health and improve access to the health care system in the most efficient manner. The team can assist you in meeting your health care goals by providing education, coordinating services, strengthening the communication among your providers and supporting you with any resource needs.    Please feel free to contact me and the Community Health Worker at [phone] with any questions or concerns. We are focused on providing you with the highest-quality healthcare experience possible and that all starts with you.     Sincerely,     Akua Springer RN    Enclosed: I have enclosed a copy of the Patient Centered Plan of Care. This has helpful information and goals that we have talked about. Please keep this in an easy to access place to use as needed.

## 2021-10-12 NOTE — PROGRESS NOTES
Clinic Care Coordination Contact    Clinic Care Coordination Contact  OUTREACH    Referral Information:  Referral Source: PCP    Primary Diagnosis: GI Disorders    Chief Complaint   Patient presents with     Clinic Care Coordination - Initial     Clinic Utilization  Difficulty keeping appointments:: No  Compliance Concerns: No  No-Show Concerns: Yes  No PCP office visit in Past Year: No  Utilization    Hospital Admissions  0             ED Visits  0             No Show Count (past year)  3                Current as of: 10/28/2021  3:42 AM              Clinical Concerns:  CC RN assessment completed today with patient via phone. It was a difficult phone call. Patient needed extra time and wasn't able to tell CC RN much re: her medical issues or follow up appts.     - Lives in single family home with friends total of 7 people.   - [place]ame [place] and she's a [place] citizen.   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   - SNAP - $300/month.   - Rental assistance - none  - Income approx $720/month  - denies abuse or neglect.  - Never  and no kids  - Has 2 siblings still in refugee.   - Dad passed away when she was just a baby.       1) [facility]   - referral was placed on 10/4/2021 by Dr Perales.  - Patient states no one calls her yet.       2) [email or web address]  - States she used to have  through [employer].   - States her Yareli speaking worker with [email or web address] left so now she's not sure who's her new worker and she hasn't been getting calls from them > 4 months.  - CHW to find out who's are patient's care team at [email or web address] and if she's still active with them.       3) [facility]   - CC RN will follow up when patient comes to see CC RN on 11/15/2021. Will assist with follow up appt if needed.   - Per chart review, patient was last seen on 6/23/2018. Recommended to return in 1 year.       4) Dermatology  - PCP placed a referral for adult dermatology on 712/2021 for  hyperpigmentation.   - Will follow up with next visit.     Pain  Pain (GOAL):: No  Health Maintenance Reviewed:    Clinical Pathway: None    Medication Management:  Medication review status: Medications reviewed and no changes reported per patient.            Functional Status:  Dependent ADLs:: Independent  Dependent IADLs:: Independent  Bed or wheelchair confined:: No  Mobility Status: Independent  Fallen 2 or more times in the past year?: No  Any fall with injury in the past year?: No    Living Situation:  Current living arrangement:: I live in a private home with family  [facility]    Lifestyle & Psychosocial Needs:    Social Determinants of Health     Tobacco Use: High Risk     Smoking Tobacco Use: Never Smoker     Smokeless Tobacco Use: Current User   Alcohol Use:      Frequency of Alcohol Consumption:      Average Number of Drinks:      Frequency of Binge Drinking:    Financial Resource Strain:      Difficulty of Paying Living Expenses:    Food Insecurity:      Worried About Running Out of Food in the Last Year:      Ran Out of Food in the Last Year:    Transportation Needs:      Lack of Transportation (Medical):      Lack of Transportation (Non-Medical):    Physical Activity:      Days of Exercise per Week:      Minutes of Exercise per Session:    Stress:      Feeling of Stress :    Social Connections:      Frequency of Communication with Friends and Family:      Frequency of Social Gatherings with Friends and Family:      Attends Congregation Services:      Active Member of Clubs or Organizations:      Attends Club or Organization Meetings:      Marital Status:    Intimate Partner Violence:      Fear of Current or Ex-Partner:      Emotionally Abused:      Physically Abused:      Sexually Abused:    Depression: Not at risk     PHQ-2 Score: 2   Housing Stability:      Unable to Pay for Housing in the Last Year:      Number of Places Lived in the Last Year:      Unstable Housing in  the Last Year:      Diet:: Regular  Inadequate nutrition (GOAL):: No  Tube Feeding: No  Inadequate activity/exercise (GOAL):: Yes  Significant changes in sleep pattern (GOAL): No  Transportation means:: Accessible car, Family     Nondenominational or spiritual beliefs that impact treatment:: No  Mental health DX:: Yes  Mental health DX how managed:: Medication, Outpatient Counseling  Mental health management concern (GOAL):: No  Chemical Dependency Status: No Current Concerns  Informal Support system:: Friends      Resources and Interventions:  Current Resources:      Community Resources: OP Mental Health  Supplies used at home:: None  Equipment Currently Used at Home: none  Employment Status: employed part-time     Advance Care Plan/Directive  Advanced Care Plans/Directives on file:: No  Advanced Care Plan/Directive Status: Declined Further Information    Referrals Placed: None     Goals:      Goals        Mental Health Management (pt-stated)          Goal statement: I will complete Neuropsych evaluation in the next 90 days.     Date goal set: 10/12/2021  Barriers: language barrier  Strengths: Agreeable to referral to [facility]  Date to achieve by: 1/122022  Patient expressed understanding of goal: Yes     Action steps to achieve this goal:   1.  I will answer my phone when I am contacted by [facility] to schedule my evaluation.   2.  I will attend my appointment with [facility] as scheduled on TBD.   3.  I will follow up with [facility] in the next month regarding this goal.     Note: Referral to [facility] sent on 10/4/2021                   Outreach Frequency: 6 weeks  Future Appointments              In 2 weeks [facility]    In 2 weeks [facility]          Plan:   1) Patient will attend her follow up appt with PCP and CC RN on 11/15/2021.

## 2021-11-15 ENCOUNTER — OFFICE VISIT (OUTPATIENT)
Dept: FAMILY MEDICINE | Facility: CLINIC | Age: 41
End: 2021-11-15
Payer: COMMERCIAL

## 2021-11-15 ENCOUNTER — PATIENT OUTREACH (OUTPATIENT)
Dept: NURSING | Facility: CLINIC | Age: 41
End: 2021-11-15
Payer: COMMERCIAL

## 2021-11-15 VITALS
HEIGHT: 61 IN | DIASTOLIC BLOOD PRESSURE: 62 MMHG | OXYGEN SATURATION: 98 % | HEART RATE: 84 BPM | SYSTOLIC BLOOD PRESSURE: 96 MMHG | BODY MASS INDEX: 23.03 KG/M2 | TEMPERATURE: 97.8 F | WEIGHT: 122 LBS

## 2021-11-15 DIAGNOSIS — Z23 HIGH PRIORITY FOR 2019-NCOV VACCINE: ICD-10-CM

## 2021-11-15 DIAGNOSIS — G47.00 INSOMNIA, UNSPECIFIED TYPE: ICD-10-CM

## 2021-11-15 DIAGNOSIS — K21.9 GASTROESOPHAGEAL REFLUX DISEASE WITHOUT ESOPHAGITIS: ICD-10-CM

## 2021-11-15 DIAGNOSIS — B18.1 CHRONIC VIRAL HEPATITIS B WITHOUT DELTA AGENT AND WITHOUT COMA (H): Primary | ICD-10-CM

## 2021-11-15 PROBLEM — D64.9 ANEMIA: Status: RESOLVED | Noted: 2017-02-13 | Resolved: 2021-11-15

## 2021-11-15 PROCEDURE — 91306 COVID-19,PF,MODERNA (18+ YRS BOOSTER .25ML): CPT | Performed by: FAMILY MEDICINE

## 2021-11-15 PROCEDURE — 0064A COVID-19,PF,MODERNA (18+ YRS BOOSTER .25ML): CPT | Performed by: FAMILY MEDICINE

## 2021-11-15 PROCEDURE — 99214 OFFICE O/P EST MOD 30 MIN: CPT | Mod: 25 | Performed by: FAMILY MEDICINE

## 2021-11-15 RX ORDER — MIRTAZAPINE 7.5 MG/1
7.5 TABLET, FILM COATED ORAL AT BEDTIME
Qty: 90 TABLET | Refills: 0 | Status: SHIPPED | OUTPATIENT
Start: 2021-11-15 | End: 2022-01-24

## 2021-11-15 SDOH — ECONOMIC STABILITY: FOOD INSECURITY: WITHIN THE PAST 12 MONTHS, YOU WORRIED THAT YOUR FOOD WOULD RUN OUT BEFORE YOU GOT MONEY TO BUY MORE.: NEVER TRUE

## 2021-11-15 SDOH — ECONOMIC STABILITY: FOOD INSECURITY: WITHIN THE PAST 12 MONTHS, THE FOOD YOU BOUGHT JUST DIDN'T LAST AND YOU DIDN'T HAVE MONEY TO GET MORE.: NEVER TRUE

## 2021-11-15 SDOH — ECONOMIC STABILITY: INCOME INSECURITY: IN THE LAST 12 MONTHS, WAS THERE A TIME WHEN YOU WERE NOT ABLE TO PAY THE MORTGAGE OR RENT ON TIME?: NO

## 2021-11-15 SDOH — ECONOMIC STABILITY: TRANSPORTATION INSECURITY
IN THE PAST 12 MONTHS, HAS LACK OF TRANSPORTATION KEPT YOU FROM MEETINGS, WORK, OR FROM GETTING THINGS NEEDED FOR DAILY LIVING?: NO

## 2021-11-15 SDOH — ECONOMIC STABILITY: TRANSPORTATION INSECURITY
IN THE PAST 12 MONTHS, HAS THE LACK OF TRANSPORTATION KEPT YOU FROM MEDICAL APPOINTMENTS OR FROM GETTING MEDICATIONS?: NO

## 2021-11-15 SDOH — HEALTH STABILITY: PHYSICAL HEALTH: ON AVERAGE, HOW MANY MINUTES DO YOU ENGAGE IN EXERCISE AT THIS LEVEL?: 20 MIN

## 2021-11-15 SDOH — HEALTH STABILITY: PHYSICAL HEALTH: ON AVERAGE, HOW MANY DAYS PER WEEK DO YOU ENGAGE IN MODERATE TO STRENUOUS EXERCISE (LIKE A BRISK WALK)?: 5 DAYS

## 2021-11-15 ASSESSMENT — MIFFLIN-ST. JEOR: SCORE: 1150.77

## 2021-11-15 ASSESSMENT — SOCIAL DETERMINANTS OF HEALTH (SDOH)
ARE YOU MARRIED, WIDOWED, DIVORCED, SEPARATED, NEVER MARRIED, OR LIVING WITH A PARTNER?: NEVER MARRIED
WITHIN THE LAST YEAR, HAVE YOU BEEN KICKED, HIT, SLAPPED, OR OTHERWISE PHYSICALLY HURT BY YOUR PARTNER OR EX-PARTNER?: NO
HOW OFTEN DO YOU GET TOGETHER WITH FRIENDS OR RELATIVES?: THREE TIMES A WEEK
DO YOU BELONG TO ANY CLUBS OR ORGANIZATIONS SUCH AS CHURCH GROUPS UNIONS, FRATERNAL OR ATHLETIC GROUPS, OR SCHOOL GROUPS?: NO
WITHIN THE LAST YEAR, HAVE YOU BEEN HUMILIATED OR EMOTIONALLY ABUSED IN OTHER WAYS BY YOUR PARTNER OR EX-PARTNER?: NO
WITHIN THE LAST YEAR, HAVE YOU BEEN AFRAID OF YOUR PARTNER OR EX-PARTNER?: NO
HOW HARD IS IT FOR YOU TO PAY FOR THE VERY BASICS LIKE FOOD, HOUSING, MEDICAL CARE, AND HEATING?: NOT HARD AT ALL
HOW OFTEN DO YOU ATTEND CHURCH OR RELIGIOUS SERVICES?: 1 TO 4 TIMES PER YEAR
IN A TYPICAL WEEK, HOW MANY TIMES DO YOU TALK ON THE PHONE WITH FAMILY, FRIENDS, OR NEIGHBORS?: MORE THAN THREE TIMES A WEEK
HOW OFTEN DO YOU ATTENT MEETINGS OF THE CLUB OR ORGANIZATION YOU BELONG TO?: NEVER
WITHIN THE LAST YEAR, HAVE TO BEEN RAPED OR FORCED TO HAVE ANY KIND OF SEXUAL ACTIVITY BY YOUR PARTNER OR EX-PARTNER?: NO

## 2021-11-15 ASSESSMENT — LIFESTYLE VARIABLES
HOW OFTEN DO YOU HAVE A DRINK CONTAINING ALCOHOL: NEVER
HOW OFTEN DO YOU HAVE SIX OR MORE DRINKS ON ONE OCCASION: NEVER

## 2021-11-15 NOTE — PROGRESS NOTES
Clinic Care Coordination Contact    Follow Up Progress Note      Assessment: follow up on goals  CC RN spoke with patient via phone today.    1) [facility]   - CHW to follow up on appt status.     2) Remeron  - Restarted on Remeron 7.5mg daily today by PCP.   - Patient states she will go pick it up later today and take it 1 tab daily at [facility].     3) [facility]   - She doesn't think she needs to f/u with [facility] because she no longer has stomach issues at this time.   - will consult with PCP    4) dermatology  - States no longer interested to schedule appt at this time.   - she will f/u with PCP in Jan, 2022 instead.     Care Gaps:    Health Maintenance Due   Topic Date Due     Pneumococcal Vaccine: Pediatrics (0 to 5 Years) and At-Risk Patients (6 to 64 Years) (1 of 2 - PPSV23) Never done       Postponed to next outreach     Goals addressed this encounter:   Goals        Mental Health Management (pt-stated)          Goal statement: I will complete Neuropsych evaluation in the next 90 days.     Date goal set: 10/12/2021  Barriers: language barrier  Strengths: Agreeable to referral to [facility]  Date to achieve by: 1/122022  Patient expressed understanding of goal: Yes     Action steps to achieve this goal:   1.  I will answer my phone when I am contacted by [facility] to schedule my evaluation.   2.  I will attend my appointment with [facility] as scheduled on TBD.   3.  I will follow up with [facility] in the next month regarding this goal.     Note: Referral to [facility] sent on 10/4/2021                 Outreach Frequency: 6 weeks    Plan:   1) CHW to f/u on [facility] appt status. Need transportation per patient.   2) CC RN will follow up in 6 weeks or sooner if needed.

## 2021-11-15 NOTE — PROGRESS NOTES
ASSESSMENT/PLAN:   Silvino was seen today for medication follow-up and imm/inj.    Diagnoses and all orders for this visit:    Chronic viral hepatitis B without delta agent and without coma (H)  Referred to GI, unclear if patient followed through. Quant is low, will continue to monitor. LFTs normal.     Insomnia, unspecified type  Patient does not remember being on remeron, but she was per chart review. She does not remember having sleep issues in the past. She was previously on 30mg remeron, will restart at low dose. Unclear if amitriptyline helped, patient struggling to recall if the medication was helpful or just made her too groggy.   -     mirtazapine (REMERON) 7.5 MG tablet; Take 1 tablet (7.5 mg) by mouth At Bedtime    High priority for 2019-nCoV vaccine  -     COVID-19,PF,MODERNA (18+ Yrs BOOSTER .25mL)    Gastroesophageal reflux disease without esophagitis  Continue pantoprazole PRN. No longer having abdominal pain.         Return in about 2 months (around 1/15/2022) for Medication Check.       ======================================================    SUBJECTIVE  Silvino Collado Say is a 41 year old female here for medication check    meds started last visit:   - amitriptyline 25mg at bedtime for sleep  Thinks makin her too groggy? She's not sure, says sometimes it helps and sometimes not, sometimes too sleepy, sometimes forgets to take it. I dont know if she understands the questions we are asking.     H pylori negative  Hep b well controlled, viral count 479  cmp normal except alk phos mildly low  Abdominal pain improved. She is on protonix.     She is asking if there's a refill for one of themedications. She occasionally has some vaginal itching. But not all the time.   She does work as a PCA.       ROS  Complete 10 point review of systems negative except as noted above in HPI      OBJECTIVE  BP 96/62 (BP Location: Left arm, Patient Position: Sitting, Cuff Size: Adult Regular)   Pulse 84   Temp 97.8  F (36.6  C)  "(Oral)   Ht 1.549 m (5' 1\")   Wt 55.3 kg (122 lb)   LMP 11/06/2021   SpO2 98%   BMI 23.05 kg/m       General: Cooperative, pleasant, in no acute distress  HEENT: PERRL, EOMI.  TM normal bilaterally.  Pharynx normal without erythema.  Tonsils normal without hypertrophy or exudates. Significant beetle nut stains.   Neck: no lymphadenopathy, no masses  CV: RRR, normal S1/S2, no murmur, rubs, gallops  Resp: No respiratory distress. Clear to auscultation bilaterally. No wheezes, rales, rhonchi  Abd: Nontender, nondistended, bowel sounds present  Ext: radial/pedal pulses +2 bilaterally  MSK: Normal muscle tone  Neuro: CN II-XII intact  Skin: warm, well perfused. No rashes  Psych: No suicidal or homicidal ideations, no self-harm.  Normal affect.    LABS & IMAGES   No results found for any visits on 11/15/21.      ======================================================  30 minutes spent on the date of the encounter doing chart review, history and exam, documentation and further activities per the note    Visit was completed along with Yareli hoang    Options for treatment and follow-up care were reviewed with the patient. Ten Nay Say and/or guardian was engaged and actively involved in the decision making process. Silvino Nay Say and/or guardian verbalized understanding of the options discussed and was satisfied with the final plan.      John Perales MD          "

## 2021-12-13 ENCOUNTER — PATIENT OUTREACH (OUTPATIENT)
Dept: NURSING | Facility: CLINIC | Age: 41
End: 2021-12-13
Payer: COMMERCIAL

## 2021-12-13 NOTE — PROGRESS NOTES
Clinic Care Coordination Contact    Community Health Worker Follow Up    Care Gaps:   Health Maintenance Due   Topic Date Due     Pneumococcal Vaccine: Pediatrics (0 to 5 Years) and At-Risk Patients (6 to 64 Years) (1 of 2 - PPSV23) Never done     Care Gaps Last addressed on 11/15/2021 with [facility] RN.   Goals:   Goals Addressed as of 12/13/2021 at 10:37 AM                    Today    10/12/21       Mental Health Management (pt-stated)   20%  10%    Added 11/1/21 by Akua Springer, RN      Goal statement: I will complete Neuropsych evaluation in the next 90 days.     Date goal set: 10/12/2021  Barriers: language barrier  St[facility]ths[facility]  Date to achieve by: 1/122022  Patient expressed understanding of goal: Yes     Action steps to achieve this goal:   1.  I will attend my neuropsych evaluation appointment at [facility] tomorrow, 12/14/2021 at 9:00 AM.  2.  My friend will provide transportation if not set up by [facility].   3.  I will follow up with [facility] in the next month regarding this goal.     Note: Patient stated she has appointmet at [facility] on 12/14/2021 at 9:00 AM and the address was provided to her.        Goal update: 12/13/2021        Intervention and Education during outreach:  -Patient is scheduled for neuropsych evaluation at [facility] on 12/14/2021 at 9:00 AM.  -Patient will receive medical transportation or patient's friend will provide medical transportation.   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  -Patient stated she does not need additional resource at this time and patient is living with a friend.       CHW Next Outreach: In one month.

## 2021-12-14 ENCOUNTER — TRANSFERRED RECORDS (OUTPATIENT)
Dept: HEALTH INFORMATION MANAGEMENT | Facility: CLINIC | Age: 41
End: 2021-12-14
Payer: COMMERCIAL

## 2021-12-27 ENCOUNTER — PATIENT OUTREACH (OUTPATIENT)
Dept: CARE COORDINATION | Facility: CLINIC | Age: 41
End: 2021-12-27
Payer: COMMERCIAL

## 2021-12-27 NOTE — PROGRESS NOTES
Clinic Care Coordination Contact    Follow Up Progress Note      Assessment: follow up on goals  Chat review completed today. CC RN spoke with patient via phone.     1) [facility]  - Per patient, she attended her appt with [facility] on 12/14/2021 pending notes to be faxed over to the clinic.   - CC RN will review notes once available.     2) [facility]  - Reminded patient of her upcoming follow up appt with [facility] on 12/29/2021 at 10:00am via virtual.     Goals addressed this encounter:   Goals Addressed                    This Visit's Progress       1. Medical (pt-stated)   20%      Goal statement: I will attend my appt with [facility] in the next 90 days.     Date goal set: 12/27/2021  Barriers: English is 2nd language, lack of knowledge  Strengths: Willing to schedule, and agrees on to work on goal  Date to achieve by: 3/27/2022  Patient expressed understanding of goal: Yes     Action steps to achieve this goal:   1. I will attend my appoint with [facility] as scheduled on 12/29/2021 at 10:00am virtual visit.   2. I will call CHW or CC RN with any concerns or questions.       [facility] [address] [address]  [phone]          COMPLETED: Mental Health Management (pt-stated)         Goal statement: I will complete Neuropsych evaluation in the next 90 days.     Date goal set: 10/12/2021  Barriers: language barrier  Strengths: Agreeable to referral to [facility]  Date to achieve by: 1/122022  Patient expressed understanding of goal: Yes     Action steps to achieve this goal:   1.  I will attend my neuropsych evaluation appointment at [facility] tomorrow, 12/14/2021 at 9:00 AM. ( COMPLETED)  2.  My friend will provide transportation if not set up by [facility].   3.  I will follow up with [facility] in the next month regarding this goal.     Note: Patient stated she has appointmet at [facility] on 12/14/2021 at 9:00 AM and the address was provided to her.        Goal update: 12/13/2021              Plan:   1) Patient will  attend her virtual follow up appt with [facility] on 12/29/2021 at 10:00am.   2) CC RN will follow up in 6 weeks or sooner if needed. Review [facility] visit notes/recommendations and [facility] visit notes/recommendation with next outreach call.

## 2021-12-29 ENCOUNTER — TRANSFERRED RECORDS (OUTPATIENT)
Dept: HEALTH INFORMATION MANAGEMENT | Facility: CLINIC | Age: 41
End: 2021-12-29
Payer: COMMERCIAL

## 2022-01-11 ENCOUNTER — PATIENT OUTREACH (OUTPATIENT)
Dept: NURSING | Facility: CLINIC | Age: 42
End: 2022-01-11
Payer: COMMERCIAL

## 2022-01-11 NOTE — PROGRESS NOTES
Clinic Care Coordination Contact  Patient has completed all goals with Clinic Care Coordination.  Please review the chart and confirm if maintenance  is approved.    -Patient is living with a friend, [place] benefits and other [place] benefits.  -Patient is a [place] citizen, not working and not applying for SSI.  -Patient went to her follow up appointment with [facility] specialist as scheduled on 12/29/2021 and was recommended to follow up again in 6 months and sooner if needed.  -Patient stated that [facility] will call her for follow up appointment when she's due and does not need assistance for coordination at this time.  -Patient is aware of her follow up appointment with PCP in the clinic.  -CHW routes this outreach encounter to CC RN for further chart review and place on maintenance when CHW will outreach patient again in two months.   -CHW will adjust outreach to monthly if new goal(s) address or establish.

## 2022-01-17 ENCOUNTER — PATIENT OUTREACH (OUTPATIENT)
Dept: CARE COORDINATION | Facility: CLINIC | Age: 42
End: 2022-01-17
Payer: COMMERCIAL

## 2022-01-17 NOTE — PROGRESS NOTES
Clinic Care Coordination Contact     Situation: Patient chart reviewed by care coordinator.     Background: Pts initial assessment and enrollment to Care Coordination was on 10/12/2021.   Patient centered goals were developed with participation from patient.  CC handed patient off to CHW for continued outreach every 30 days.      Assessment: CHW has been in contact with patient monthly. Patient has made progress to goals. Per chart review, patient was seen at [facility] on 12/29/2021 and recommended to return in 6 months, and every 6 months until she's 44 yr old. CC RN spoke with [facility] scheduling today to assist patient scheduled 6 month follow up but appt is not open until March, 22 for appt in June, 2022.       Plan/Recommendations: Okay to move patient to maintenance but CHW to assist patient schedule 6 month f/u with [facility] before graduation.   CC RN will follow up in 8 weeks or sooner if needed.      Care Plan updated and mailed to patient: yes

## 2022-01-18 NOTE — TELEPHONE ENCOUNTER
Patient will due to follow up again in July 2022 for 6 months however [facility] specialists' schedules are not available until May 2022. I will remind myself for follow up.

## 2022-01-24 ENCOUNTER — OFFICE VISIT (OUTPATIENT)
Dept: FAMILY MEDICINE | Facility: CLINIC | Age: 42
End: 2022-01-24
Payer: COMMERCIAL

## 2022-01-24 VITALS
OXYGEN SATURATION: 98 % | WEIGHT: 126.13 LBS | BODY MASS INDEX: 23.83 KG/M2 | RESPIRATION RATE: 16 BRPM | HEART RATE: 100 BPM | SYSTOLIC BLOOD PRESSURE: 92 MMHG | TEMPERATURE: 98.1 F | DIASTOLIC BLOOD PRESSURE: 64 MMHG

## 2022-01-24 DIAGNOSIS — F34.1 PERSISTENT DEPRESSIVE DISORDER WITH ANXIOUS DISTRESS, CURRENTLY MODERATE: ICD-10-CM

## 2022-01-24 DIAGNOSIS — B37.31 CANDIDIASIS OF VAGINA: ICD-10-CM

## 2022-01-24 DIAGNOSIS — Z60.4 SOCIAL ISOLATION: ICD-10-CM

## 2022-01-24 DIAGNOSIS — F33.3 SEVERE EPISODE OF RECURRENT MAJOR DEPRESSIVE DISORDER, WITH PSYCHOTIC FEATURES (H): ICD-10-CM

## 2022-01-24 DIAGNOSIS — L29.9 ITCHING: ICD-10-CM

## 2022-01-24 DIAGNOSIS — G47.00 INSOMNIA, UNSPECIFIED TYPE: Primary | ICD-10-CM

## 2022-01-24 DIAGNOSIS — B18.1 CHRONIC VIRAL HEPATITIS B WITHOUT DELTA AGENT AND WITHOUT COMA (H): ICD-10-CM

## 2022-01-24 DIAGNOSIS — F43.10 PTSD (POST-TRAUMATIC STRESS DISORDER): ICD-10-CM

## 2022-01-24 DIAGNOSIS — K21.9 GASTROESOPHAGEAL REFLUX DISEASE WITHOUT ESOPHAGITIS: ICD-10-CM

## 2022-01-24 PROCEDURE — 99214 OFFICE O/P EST MOD 30 MIN: CPT | Performed by: FAMILY MEDICINE

## 2022-01-24 RX ORDER — FLUCONAZOLE 150 MG/1
150 TABLET ORAL ONCE
Qty: 1 TABLET | Refills: 1 | Status: SHIPPED | OUTPATIENT
Start: 2022-01-24 | End: 2022-01-24

## 2022-01-24 RX ORDER — MIRTAZAPINE 15 MG/1
15 TABLET, ORALLY DISINTEGRATING ORAL AT BEDTIME
Qty: 90 TABLET | Refills: 0 | Status: SHIPPED | OUTPATIENT
Start: 2022-01-24 | End: 2022-03-31

## 2022-01-24 SDOH — SOCIAL STABILITY - SOCIAL INSECURITY: SOCIAL EXCLUSION AND REJECTION: Z60.4

## 2022-01-24 NOTE — PROGRESS NOTES
ASSESSMENT/PLAN:   Silvino was seen today for meds f/u.    Diagnoses and all orders for this visit:    Chronic viral hepatitis B without delta agent and without coma (H)  Follow up with [facility] every 6 months.     Severe episode of recurrent major depressive disorder, with psychotic features (H)  PTSD (post-traumatic stress disorder)  Persistent depressive disorder with anxious distress, currently moderate  Social isolation  Insomnia, unspecified type  Evaluated by [facility], does not qualify for a neurocognitive disorder. signficant social isolation and depression. Patient should be seeing her psychologist but does not want to. Patient's sleep is not good, previously on mirtazapine 30mg. Will increase from 7.5 to 15mg today. Really unclear if she is taking regularly.   -     mirtazapine (REMERON SOL-TAB) 15 MG ODT; 1 tablet (15 mg) by Orally disintegrating tablet route At Bedtime    Gastroesophageal reflux disease without esophagitis, resolved.   Stop PPI.     Itching  Candidiasis of vagina  -     fluconazole (DIFLUCAN) 150 MG tablet; Take 1 tablet (150 mg) by mouth once for 1 dose        Return in about 1 month (around 2/24/2022) for Medication Check, depression/anxiety, sooner if needed.       ======================================================    SUBJECTIVE  Silvino Collado Say is a 41 year old female here for medication follow up.     She says she is not sure what she is here for. At last visit we discussed sleep.  Sleep has not been good. Unsure if her medications are helping.   She was previously on remeron 30mg a long time ago, but then stopped for unknown reasons. Restarted at lower dose, she says she does not think it his helping.   She says she last saw her psychiatrist in November - but that may have been [facility] for her neuropsych testing. She used to see her therapist regularly but it has been a while - she says she has not been receiving calls anymore.   I asked how her mood is doing. She says she does not feel well,  sometimes she does not want to do or think anything.      Itching before she has her period, she says she's not sure why.   It goes away    ROS  Complete 10 point review of systems negative except as noted above in HPI      OBJECTIVE  BP 92/64   Pulse 100   Temp 98.1  F (36.7  C) (Oral)   Resp 16   Wt 57.2 kg (126 lb 2 oz)   LMP 12/10/2021 (Approximate)   SpO2 98%   BMI 23.83 kg/m       General: Cooperative, pleasant, in no acute distress  HEENT: PERRL, EOMI.  TM normal bilaterally.  Pharynx normal without erythema.  Tonsils normal without hypertrophy or exudates.  Neck: no lymphadenopathy, no masses  CV: RRR, normal S1/S2, no murmur, rubs, gallops  Resp: No respiratory distress. Clear to auscultation bilaterally. No wheezes, rales, rhonchi  Abd: Nontender, nondistended, bowel sounds present  Ext: radial/pedal pulses +2 bilaterally  MSK: Normal muscle tone  Neuro: CN II-XII intact  Skin: warm, well perfused. No   Psych: No suicidal or homicidal ideations, no self-harm.  Normal affect.    LABS & IMAGES   No results found for any visits on 01/24/22.      ======================================================    Visit was completed along with Yareli hoang    Options for treatment and follow-up care were reviewed with the patient. Silvino Hollyy Say and/or guardian was engaged and actively involved in the decision making process. Silvino Collado Say and/or guardian verbalized understanding of the options discussed and was satisfied with the final plan.      John Perales MD

## 2022-01-25 PROBLEM — Z60.4 SOCIAL ISOLATION: Status: ACTIVE | Noted: 2022-01-25

## 2022-03-15 ENCOUNTER — PATIENT OUTREACH (OUTPATIENT)
Dept: NURSING | Facility: CLINIC | Age: 42
End: 2022-03-15
Payer: COMMERCIAL

## 2022-03-15 NOTE — PROGRESS NOTES
Clinic Care Coordination Contact  Patient has completed all goals with Clinic Care Coordination.  Please review the chart and confirm if graduation is approved.    -CHW informed patient of upcoming appointment with PCP.  -Patient is living with a friend, receives SNAP benefits and other [place] benefits.  -Patient is a [place] citizen, not working and not applying for SSI.  -Patient does not need coordination assistance at this time.  -Patient did not address or establish new goal.  -CHW routes this outreach encounter to [facility] RN for further chart review to graduate patient from [facility] and resolve Episode as appropriate.

## 2022-03-16 NOTE — TELEPHONE ENCOUNTER
Just FYI: Patient will due for 6 months follow up again in July 2022, however [facility] specialists' schedules are not available until May 2022 to schedule patients. I reminded myself to follow up.

## 2022-03-17 ENCOUNTER — PATIENT OUTREACH (OUTPATIENT)
Dept: NURSING | Facility: CLINIC | Age: 42
End: 2022-03-17
Payer: COMMERCIAL

## 2022-03-17 NOTE — LETTER
[facility]  Patient Centered Plan of Care  About Me:        Patient Name:  Silvino Caceres    YOB: 1980  Age:         42 year old   Yordan MRN:    [id number] Telephone Information:  Home Phone [phone]   Mobile [phone]       Address:  [address]  [place] [address] Email address:  No e-mail address on record      Emergency Contact(s)    Name Relationship Lgl Grd Work Phone Home Phone Mobile Phone   1. JAQUI ARCE Aunt    [phone]           Primary language:  Yareli     needed? Yes   [employer]:  [phone] op. 1  Other communication barriers:Language barrier; Lack of coping    Preferred Method of Communication:     Current living arrangement: I live in a private home with family    Mobility Status/ Medical Equipment: Independent        Health Maintenance  Health Maintenance Reviewed: No data recorded    My Access Plan  Medical Emergency 911   Primary Clinic Line [facility] - [phone]   24 Hour Appointment Line [phone] or  [phone] ([phone]) (toll-free)   24 Hour Nurse Line [phone] (toll-free)   Preferred Urgent Care [facility] [phone]     [facility]  [phone]     Preferred Pharmacy [facility] - [address]     Behavioral Health Crisis Line The National Suicide Prevention Lifeline at 1-794.963.7330 or 911             My Care Team Members  Patient Care Team       Relationship Specialty Notifications Start End    John Perales MD PCP - General Family Medicine Abnormal results only, Admissions 9/2/21     Phone: [phone] Fax: [phone]         [address] [facility] [address] [place] [address]    John Perales MD Assigned PCP   6/16/21     Phone: [phone] Fax: [phone]         [address] [facility] [address] [place] [address]    Akua Springer RN Lead Care Coordinator Primary Care - CC Admissions  10/12/21     Brenton Bennett Community Health Worker Primary Care - CC Admissions 11/1/21     Phone: [phone] Fax: [phone]         [address] [facility] [address] [facility] [address]    Denis Black MD MD Internal Medicine  1/11/22     last seen on 12/29/21. ( to return in 6 months - to be seen every 6 months until she's 44.     Phone: [phone] Fax: [phone]         [facility] [address] [facility] [address] [facility] [address]            My Care Plans  Self Management and Treatment Plan  Goals and (Comments)  Goals        General     1. Med teaching (pt-stated)      Notes - Note edited  3/17/2022 12:52 PM by Akua Springer RN     Goal Statement: I want to learn how to set up my medications correctly by working with the CCC RN over the next 90 days.    Date goal set: 3/17/2022  Barriers: language barrier, and lack of knowledge  Strengths: Agrees to work on goal  Date to Achieve By:   Patient expressed understanding of goal: Yes    Action steps to achieve this goal  1. I will meet with the CCC RN in the clinic on 3/31/2022 at 9:30am.   2. I will take my medications as prescribed and as they are set up by my daughter./son  3. I/daughter/son will call the CCC RN with concerns or questions.                Action Plans on File:                       Advance Care Plans/Directives Type:   No data recorded    My Medical and Care Information  Problem List   Patient Active Problem List   Diagnosis     Insomnia, unspecified type     Chronic viral hepatitis B without delta agent and without coma (H)     Hyperlipidemia     Vitamin D deficiency     Severe episode of recurrent major depressive disorder, with psychotic features (H)     PTSD (post-traumatic stress disorder)     Persistent depressive disorder with anxious distress, currently moderate     Chronic pain of both shoulders     Gastroesophageal reflux disease without esophagitis     Itching     Social isolation      Current Medications and Allergies:  See printed  Medication Report.    Care Coordination Start Date: 10/12/2021   Frequency of Care Coordination: 6 weeks     Form Last Updated: 03/17/2022

## 2022-03-17 NOTE — PROGRESS NOTES
"Clinic Care Coordination Contact    Follow Up Progress Note      Assessment: Patient was moved to maintenance on 1/17/2022. Chart review competed before graduation and patient reported to PCP during clinic visit on 1/24/2022 of worsening depression. Patient declined to see psychiatrist and therapy. CC RN spoke with patient today and patient was mumbling most of the time and very hard to understanding her at times. Patient reports of suicidal ideations. States \" I wish of dying at times\". Denies active suicidal ideation today. Patient states she plans to come see PCp on 3/31/2022 and agreed to see CC RN after she saw her PCP for follow up. Patient again declined to see psychiatrist or therapy stating that \" I don't like to talk\". Patient states \" someone used to hurt me in the past\" when asked why worsening depression symptoms. Instructed patient to call 911 with active suicidal ideations. Patient verbalized understanding. 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 Moved patient back to active with new goal. CC RN updated PCP re: worsening depression and PCP is aware.       Goals addressed this encounter:   Goals Addressed                    This Visit's Progress       1. Med teaching (pt-stated)   20%      Goal Statement: I want to learn how to set up my medications correctly by working with the CCC RN over the next 90 days.    Date goal set: 3/17/2022  Barriers: language barrier, and lack of knowledge  Strengths: Agrees to work on goal  Date to Achieve By:   Patient expressed understanding of goal: Yes    Action steps to achieve this goal  1. I will meet with the CCC RN in the clinic on 3/31/2022 at 9:30am.   2. I will take my medications as prescribed and as they are set up by my daughter./son  3. I/daughter/son will call the CCC RN with concerns or questions.                Outreach Frequency: 6 weeks    Plan:   1) Patient will attend her appt with PCP and CC RN on 3/31/2022.       "

## 2022-03-31 ENCOUNTER — ALLIED HEALTH/NURSE VISIT (OUTPATIENT)
Dept: NURSING | Facility: CLINIC | Age: 42
End: 2022-03-31

## 2022-03-31 ENCOUNTER — OFFICE VISIT (OUTPATIENT)
Dept: FAMILY MEDICINE | Facility: CLINIC | Age: 42
End: 2022-03-31
Payer: COMMERCIAL

## 2022-03-31 VITALS
WEIGHT: 123.06 LBS | SYSTOLIC BLOOD PRESSURE: 132 MMHG | HEART RATE: 60 BPM | RESPIRATION RATE: 16 BRPM | HEIGHT: 61 IN | DIASTOLIC BLOOD PRESSURE: 86 MMHG | BODY MASS INDEX: 23.23 KG/M2 | TEMPERATURE: 97.8 F

## 2022-03-31 DIAGNOSIS — F51.01 PRIMARY INSOMNIA: ICD-10-CM

## 2022-03-31 DIAGNOSIS — Z71.89 COMPLEX CARE COORDINATION: Primary | ICD-10-CM

## 2022-03-31 DIAGNOSIS — F33.3 SEVERE EPISODE OF RECURRENT MAJOR DEPRESSIVE DISORDER, WITH PSYCHOTIC FEATURES (H): Primary | ICD-10-CM

## 2022-03-31 PROCEDURE — 99207 PR NO CHARGE LOS: CPT

## 2022-03-31 PROCEDURE — 99214 OFFICE O/P EST MOD 30 MIN: CPT | Performed by: FAMILY MEDICINE

## 2022-03-31 RX ORDER — CITALOPRAM HYDROBROMIDE 10 MG/1
10 TABLET ORAL DAILY
Qty: 30 TABLET | Refills: 1 | Status: SHIPPED | OUTPATIENT
Start: 2022-03-31 | End: 2022-05-05 | Stop reason: DRUGHIGH

## 2022-03-31 RX ORDER — QUETIAPINE FUMARATE 25 MG/1
25 TABLET, FILM COATED ORAL
Qty: 30 TABLET | Refills: 1 | Status: SHIPPED | OUTPATIENT
Start: 2022-03-31 | End: 2022-05-05

## 2022-03-31 ASSESSMENT — PATIENT HEALTH QUESTIONNAIRE - PHQ9: SUM OF ALL RESPONSES TO PHQ QUESTIONS 1-9: 15

## 2022-03-31 NOTE — PROGRESS NOTES
"Clinic Care Coordination Contact    Follow Up Progress Note      Assessment: follow up on depression and insomnia  Patient met with CCRN today in clinic to follow up on worsening depression.   Per patient, [place] came to [place] in March[place] 2011 to [place], [place]. Patient used to live in [facility] [place]. Never . Patient has 2 younger brothers. One brother lives in [facility] and the other brother still live in [place]. Dad passed away when she was 6 yr old. Mom lives in [place]. Patient states she at times she talks to her brothers and mom. Patient moved from [place] to [place] then to [place] in 2[place]16. Patient is a [place] citizen. Patient states she would like to go back to live in [facility] one day but now it's not safe to go back yet.     Patient states she always feel tired and no longer wish to live but never intended to act on her thoughts. Patient states \" Maybe it's not my time yet\" when asked what stopped her from acting on her thoughts.     Patient reports long hx of insomnia. States she simply couldn't sleep well at night for years. States she was given 12 different herbal medicine mixed when she was in her 20s because she couldn't sleep for days. States she felt very ill after she took a dose of the herbal medicine and she felt like her mental health symptoms got worse since.     Patient currently living with her friend and she's the pca of her friend's mom 2 hours per day. She feels isolated and lonely without her family here and wishes one day to go back and live in [place] but states she couldn't go back now because it's not safe to go back yet.     Patient states she only take her Mirtazapine once in awhile because it makes her extremities twitches. CCRN updated PCP in person with patient's concerns. Patient will see PCP after CCRN appt.     Patient declined therapy and seeing a psychiatrist in the past but today she agrees to try therapy via phone only. PCP to place a referral.    6 month follow up with " [facility] due around 6/29/2022    Goals addressed this encounter:   Goals        1. Med teaching (pt-stated)       Goal Statement: I want to learn how to set up my medications correctly by working with the CCC RN over the next 90 days.    Date goal set: 3/17/2022  Barriers: language barrier, and lack of knowledge  Strengths: Agrees to work on goal  Date to Achieve By: 6/17/2022  Patient expressed understanding of goal: Yes    Action steps to achieve this goal  1. I will meet with the CCC RN in the clinic on 3/31/2022 at 9:30am.   2. I will take my medications as prescribed.  3. I/daughter/son will call the CCC RN with concerns or questions.            2. Therapy (pt-stated)       Goal Statement: I want to establish care with a Therapist in the next 90 days so that I can better manage my mental health symptoms and stressors.    Date Goal set: 3/31/2022  Barriers: language barrier  Strengths: Willing to schedule  Date to Achieve By: 6/30/2022  Patient expressed understanding of goal: Yes    Action steps to achieve this goal:  1. I will answer my phone when I am contacted to schedule an intake appointment with a Therapist.  2. I will attend my initial Therapy appointment and schedule additional therapy sessions.  3. I will follow up with [facility] in the next month regarding this goal.    Note: Referred to [facility] on TBD.               Outreach Frequency: 6 weeks    The patient consented via Verbal consent to have contact information and resources sent via text in an unencrypted manner.    Plan:   1) CC RN will assist with initial therapy appt.

## 2022-03-31 NOTE — PROGRESS NOTES
"ASSESSMENT/PLAN:   Silvino was seen today for follow up.    Diagnoses and all orders for this visit:    Severe episode of recurrent major depressive disorder, with psychotic features (H)  Primary insomnia  Will trial seroquel for sleep, discontinue mirtazapine. Start citalopram for depression. Agreed to see psychology, will refer to maeve. Also connected to care coordination.   -     Other Specialty Referral; Future  -     QUEtiapine (SEROQUEL) 25 MG tablet; Take 1 tablet (25 mg) by mouth nightly as needed (sleep)  - Citalopram 10mg       Return in about 1 month (around 4/30/2022) for Medication Check.       ======================================================    SUBJECTIVE  Silvino Caceres is a 42 year old female here for follow up sleep, worsening depression    Sleep/Depression  Mirtazapine makes her shaky. She does not want to take it any more. She does not sleep and has no appetite. She thinks about dying but not about killing herself. She did agree to talk to a therapist.     At home, she usually just does some house work.   There are other people in the house, but she does not really interact with them.     She gets very depressed in the winter because of the weather. She says she does better int he summer. In the winter, she always wants to move back to [facility].       ROS  Complete 10 point review of systems negative except as noted above in HPI      OBJECTIVE  /86 (BP Location: Right arm, Patient Position: Sitting, Cuff Size: Adult Regular)   Pulse 60   Temp 97.8  F (36.6  C) (Oral)   Resp 16   Ht 1.549 m (5' 1\")   Wt 55.8 kg (123 lb 1 oz)   LMP 03/30/2022 (Exact Date)   BMI 23.25 kg/m       General: Cooperative, pleasant, in no acute distress  CV: RRR, normal S1/S2, no murmur, rubs, gallops  Resp: No respiratory distress. Clear to auscultation bilaterally. No wheezes, rales, rhonchi  Abd: Nontender, nondistended, bowel sounds present  Ext: radial/pedal pulses +2 bilaterally  MSK: Normal " muscle tone  Neuro: CN II-XII intact  Skin: warm, well perfused. No rashes  Psych: Does have suicidal ideations - thoughts of being better off dead, no intent, no self-harm.  Depressed affect, occasionally smiling - incongruent with her statements of hopelessness.    LABS & IMAGES   No results found for any visits on 03/31/22.      ======================================================    Visit was completed along with Yareli     Options for treatment and follow-up care were reviewed with the patient. Silvino Collado Say and/or guardian was engaged and actively involved in the decision making process. Silvino Collado Say and/or guardian verbalized understanding of the options discussed and was satisfied with the final plan.      John Perales MD

## 2022-04-06 ENCOUNTER — PATIENT OUTREACH (OUTPATIENT)
Dept: NURSING | Facility: CLINIC | Age: 42
End: 2022-04-06
Payer: COMMERCIAL

## 2022-04-06 NOTE — PROGRESS NOTES
Clinic Care Coordination Contact    Follow Up Progress Note      Assessment: CCRN spoke with patient today and she was able to tell CCRN when her initial therapy is scheduled tomorrow at 12pm with Rhonda Dao. Patient states she's re-start taking her meds and she's able to sleep a bit more. Patient seems to be more alert today and pleasant to talk to. Instructed patient to answer her phone when her therapist calls her tomorrow. Patient verbalized understanding.     Care Gaps:    There are no preventive care reminders to display for this patient.      Goals addressed this encounter:   Goals Addressed                    This Visit's Progress       2. Therapy (pt-stated)   20%      Goal Statement: I want to establish care with a Therapist in the next 90 days so that I can better manage my mental health symptoms and stressors.    Date Goal set: 3/31/2022  Barriers: language barrier  Strengths: Willing to schedule  Date to Achieve By: 6/30/2022  Patient expressed understanding of goal: Yes    Action steps to achieve this goal:  1. I will answer my phone when I am contacted to schedule an intake appointment with a Therapist.  2. I will attend my initial Therapy appointment and schedule on on 4/7/2022 at 12:00pm with Angelica Barrett.   3. I will follow up with CCC in the next month regarding this goal.    Note: Referred to [facility] on 3/31/2022.    Note    Appt: 04/07/2022 12pm for VV with Angelica Barrett @ [facility], Phone: [phone].                    Plan:   1) Patient will talk to her therapist tomorrow at 12pm - [facility]  2) CCRN will follow up in 6 weeks or sooner if needed.

## 2022-04-21 ENCOUNTER — PATIENT OUTREACH (OUTPATIENT)
Dept: NURSING | Facility: CLINIC | Age: 42
End: 2022-04-21
Payer: COMMERCIAL

## 2022-04-21 NOTE — PROGRESS NOTES
Clinic Care Coordination Contact  Community Health Worker Follow Up    Care Gaps:   There are no preventive care reminders to display for this patient.    Goals:    Goals Addressed as of 4/21/2022 at 9:46 AM                    Today    4/6/22       1. Med teaching (pt-stated)   30%      Added 3/17/22 by Akua Springer, RN      Goal Statement: I want to learn how to set up my medications correctly by working with the CCC RN over the next 90 days.    Date goal set: 3/17/2022  Barriers: language barrier, and lack of knowledge  Strengths: Agrees to work on goal  Date to Achieve By: 6/17/2022  Patient expressed understanding of goal: Yes    Action steps to achieve this goal  1. I will meet with the CCC RN in the clinic on 3/31/2022 at 9:30am. (Completed)  2. I will come see CCC RN again as scheduled on 5/19/2022 for med teaching and will bring all medication bottles.   3. I/daughter/son will call the CCC RN with concerns or questions.       Goal update: 4/21/2022           2. Therapy (pt-stated)   30%  20%    Added 3/31/22 by Akua Springer, RN      Goal Statement: I want to establish care with a Therapist in the next 90 days so that I can better manage my mental health symptoms and stressors.    Date Goal set: 3/31/2022  Barriers: language barrier  Strengths: Willing to schedule  Date to Achieve By: 6/30/2022  Patient expressed understanding of goal: Yes    Action steps to achieve this goal:  1. I will answer my phone when I am contacted to schedule an intake appointment with a Therapist.  2. I will attend my initial Therapy appointment and schedule on on 4/7/2022 at 12:00pm with Angelica Barrett. (Patient no showed and rescheduled on 5/11/2022 at 11:00 AM).  3. I will follow up with [facility] in the next month regarding this goal.    Note: CHW reminded patient of rescheduled appointment via telephone initial on 5/11/2022 at 11:00 AM.     Note    Appt: 04/07/2022 12pm for VV with Angelica Barrett @ [facility], Phone:  [phone].        Goal update: 4/21/2022          Intervention and Education during outreach:   -Patient reported has all medications and taking them daily as prescribed.  -Patient missed initial appointment with [facility] therapist on 4/07/20221 and CHW assisted with rescheduling via telephone on 5/11/2022 at 11:00 AM and reminded patient.   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  -Patient is a [place] citizen.   -Patient reported no additional coordination assistance needed at this time.   -CHW informed patient of all upcoming appointments.         CHW Next Outreach: In one month.

## 2022-05-05 ENCOUNTER — PATIENT OUTREACH (OUTPATIENT)
Dept: NURSING | Facility: CLINIC | Age: 42
End: 2022-05-05

## 2022-05-05 ENCOUNTER — OFFICE VISIT (OUTPATIENT)
Dept: FAMILY MEDICINE | Facility: CLINIC | Age: 42
End: 2022-05-05
Payer: COMMERCIAL

## 2022-05-05 VITALS
SYSTOLIC BLOOD PRESSURE: 116 MMHG | BODY MASS INDEX: 22.66 KG/M2 | DIASTOLIC BLOOD PRESSURE: 76 MMHG | TEMPERATURE: 97.9 F | HEART RATE: 80 BPM | OXYGEN SATURATION: 99 % | HEIGHT: 61 IN | WEIGHT: 120 LBS

## 2022-05-05 DIAGNOSIS — G47.00 INSOMNIA, UNSPECIFIED TYPE: ICD-10-CM

## 2022-05-05 DIAGNOSIS — F34.1 PERSISTENT DEPRESSIVE DISORDER WITH ANXIOUS DISTRESS, CURRENTLY MODERATE: ICD-10-CM

## 2022-05-05 DIAGNOSIS — F33.3 SEVERE EPISODE OF RECURRENT MAJOR DEPRESSIVE DISORDER, WITH PSYCHOTIC FEATURES (H): Primary | ICD-10-CM

## 2022-05-05 DIAGNOSIS — F43.10 PTSD (POST-TRAUMATIC STRESS DISORDER): ICD-10-CM

## 2022-05-05 DIAGNOSIS — Z60.4 SOCIAL ISOLATION: ICD-10-CM

## 2022-05-05 PROCEDURE — 99214 OFFICE O/P EST MOD 30 MIN: CPT | Performed by: FAMILY MEDICINE

## 2022-05-05 RX ORDER — QUETIAPINE FUMARATE 50 MG/1
50 TABLET, FILM COATED ORAL AT BEDTIME
Qty: 30 TABLET | Refills: 2 | Status: SHIPPED | OUTPATIENT
Start: 2022-05-05 | End: 2022-08-30 | Stop reason: SINTOL

## 2022-05-05 RX ORDER — CITALOPRAM HYDROBROMIDE 20 MG/1
20 TABLET ORAL EVERY MORNING
Qty: 30 TABLET | Refills: 2 | Status: SHIPPED | OUTPATIENT
Start: 2022-05-05 | End: 2022-08-30

## 2022-05-05 SDOH — SOCIAL STABILITY - SOCIAL INSECURITY: SOCIAL EXCLUSION AND REJECTION: Z60.4

## 2022-05-05 NOTE — PROGRESS NOTES
"ASSESSMENT/PLAN:   Silvino was seen today for depression and recheck medication.    Diagnoses and all orders for this visit:    Severe episode of recurrent major depressive disorder, with psychotic features (H)  Persistent depressive disorder with anxious distress, currently moderate  PTSD (post-traumatic stress disorder)  Patient denies any issues with the medicine but does not feel it is working. Will increase celexa.   -     citalopram (CELEXA) 20 MG tablet; Take 1 tablet (20 mg) by mouth every morning      Social isolation  Reminded patient of therapy appt on 5/11/2021 at 11am.     Insomnia, unspecified type  -     QUEtiapine (SEROQUEL) 50 MG tablet; Take 1 tablet (50 mg) by mouth At Bedtime          Return in about 1 month (around 6/5/2022) for Medication Check, depression/anxiety.       ======================================================    SUBJECTIVE  Silvino Caceres is a 42 year old female here for follow up depression.     Psychology appt rescheduled to 5/11 with maeve at 11am - phone appointment. She missed her appt on 4/7/22. Patient said she was not aware of this. Appt card given to remind her.     Last visit we changed sleep med to seroquel because mirtazapine was making her shaky.   Her sleep has been better on seroquel, but she thinks she could use more sleep.   She takes her medicine at 6-7pm, then falls asleep about 9pm but only gets a few hours of sleep and gets very restless.      Citalopram started for depression/anxiety, currently on 10mg daily.     She spoke to [facility]N on 4/21/22 for updates.       ROS  Complete 10 point review of systems negative except as noted above in HPI      OBJECTIVE  /76 (BP Location: Left arm, Patient Position: Sitting, Cuff Size: Adult Regular)   Pulse 80   Temp 97.9  F (36.6  C) (Oral)   Ht 1.549 m (5' 1\")   Wt 54.4 kg (120 lb)   LMP 04/20/2022   SpO2 99%   BMI 22.67 kg/m       General: Cooperative, pleasant, in no acute distress  CV: RRR, normal S1/S2, no " murmur, rubs, gallops  Resp: No respiratory distress. Clear to auscultation bilaterally. No wheezes, rales, rhonchi  Abd: Nontender, nondistended, bowel sounds present  Ext: radial/pedal pulses +2 bilaterally  MSK: Normal muscle tone  Neuro: CN II-XII intact  Skin: warm, well perfused. No rahes  Psych: No suicidal or homicidal ideations, no self-harm. Depressed affect.    LABS & IMAGES   No results found for any visits on 05/05/22.      ======================================================    Visit was completed along with Yareli hoang    Options for treatment and follow-up care were reviewed with the patient. Silvino Collado Say and/or guardian was engaged and actively involved in the decision making process. Silvino Collado Say and/or guardian verbalized understanding of the options discussed and was satisfied with the final plan.      John Perales MD

## 2022-05-05 NOTE — PROGRESS NOTES
"Clinic Care Coordination Contact    Follow Up Progress Note      Assessment: follow up on med compliance. Patient was seen by PCP today in clinic for follow up. Per patient, her depression/mood has not get any better and she feels down daily. Patient states she feels like this for years and she thinks it's getting worse. She feels like it's not worth living and she doesn't want to go out with her friends. Patient has appt with a therapist on 5/11/2022 at 11am with [facility]. Instructed patient the important of answering her phone when a therapist contacts her on 5/11/2022. Patient denies active suicidal ideations today stating that \" I don't want to just hurt myself. It's against my Sikhism but I just don't know how to deal with these feelings inside me\". Educated patient on therapeutic coping skills such as listening to her favorite music, watch Tunisian/damari movies, talking to her friends and go out for walk when she can. Instructed patient to call 911 with active ideations. Patient verbalized understanding.     Care Gaps:    There are no preventive care reminders to display for this patient.      Goals addressed this encounter:    Goals Addressed                    This Visit's Progress       1. Therapy (pt-stated)         Goal Statement: I want to establish care with a Therapist in the next 90 days so that I can better manage my mental health symptoms and stressors.    Date Goal set: 3/31/2022  Barriers: language barrier  Strengths: Willing to schedule  Date to Achieve By: 6/30/2022  Patient expressed understanding of goal: Yes    Action steps to achieve this goal:  1. I will answer my phone when I am contacted to schedule an intake appointment with a Therapist.  2. I will attend my initial Therapy appointment and schedule on on 4/7/2022 at 12:00pm with Angelica Barrett. (Patient no showed and rescheduled on 5/11/2022 at 11:00 AM).  3. I will follow up with [facility] in the next month regarding this " goal.    Note: CHW reminded patient of rescheduled appointment via telephone initial on 5/11/2022 at 11:00 AM.     Note    Appt: 04/07/2022 12pm for VV with Angelica Barrett @ [facility], Phone: [phone].        Goal update: 4/21/2022           COMPLETED: Med teaching (pt-stated)   100%      Goal Statement: I want to learn how to set up my medications correctly by working with the CCC RN over the next 90 days.    Date goal set: 3/17/2022  Barriers: language barrier, and lack of knowledge  Strengths: Agrees to work on goal  Date to Achieve By: 6/17/2022  Patient expressed understanding of goal: Yes    Action steps to achieve this goal  1. I will meet with the CCC RN in the clinic on 3/31/2022 at 9:30am. (Completed)  2. I will come see CCC RN again as scheduled on 5/5/2022 for med teaching via phone. ( Completed)  3. I/daughter/son will call the CCC RN with concerns or questions.       Goal update: 4/21/2022            Outreach Frequency: 6 weeks    Plan:   1) Patient will attend her appt with [facility] on 5/11/2022 at 11am via phone.   2) CHW to remind patient of her phone appt with [facility] on 5/11/2022 at 11am the morning of appt.

## 2022-05-31 ENCOUNTER — PATIENT OUTREACH (OUTPATIENT)
Dept: NURSING | Facility: CLINIC | Age: 42
End: 2022-05-31
Payer: COMMERCIAL

## 2022-05-31 NOTE — PROGRESS NOTES
Clinic Care Coordination Contact  Community Health Worker Follow Up    Care Gaps:   There are no preventive care reminders to display for this patient.    Goals:    Goals Addressed as of 5/31/2022 at 3:26 PM                    4/21/22 4/6/22       1. Therapy (pt-stated)   30%1   20%1     Added 3/31/22 by Akua Springer RN      Goal Statement: I want to establish care with a Therapist in the next 90 days so that I can better manage my mental health symptoms and stressors.    Date Goal set: 3/31/2022  Barriers: language barrier  Strengths: Willing to schedule  Date to Achieve By: 6/30/2022  Patient expressed understanding of goal: Yes    Action steps to achieve this goal:  1. I will answer my phone when I am contacted to schedule an intake appointment with a Therapist.  2. I will attend my initial Therapy appointment and schedule on on 4/7/2022 at 12:00pm with Angelica Barrett. (Patient no showed and rescheduled on 5/11/2022 at 11:00 AM and no showed) Rescheduled on 6/27/2022 at 10 AM and ride arranged with [employer].  3. I will follow up with CCC in the next month regarding this goal.    Note: Patient has difficulty with accessing phone to connect video appointment and CHW assisted with in-person appointment.    [facility]  [address]  [place] [address] [phone].    Goal update: 5/31/2022.          Intervention and Education during outreach:   -CHW assisted patient rescheduled appointment with [facility] that patient was no showed again on 5/11/2022 for initial appointment.  -Patient is rescheduled on 6/27/2022 at 10 AM and ride arranged with [employer].  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        CHW Next Outreach: In one month.

## 2022-06-09 ENCOUNTER — OFFICE VISIT (OUTPATIENT)
Dept: FAMILY MEDICINE | Facility: CLINIC | Age: 42
End: 2022-06-09
Payer: COMMERCIAL

## 2022-06-09 VITALS
HEIGHT: 61 IN | TEMPERATURE: 97.4 F | WEIGHT: 121 LBS | HEART RATE: 78 BPM | BODY MASS INDEX: 22.84 KG/M2 | OXYGEN SATURATION: 99 % | RESPIRATION RATE: 16 BRPM | DIASTOLIC BLOOD PRESSURE: 68 MMHG | SYSTOLIC BLOOD PRESSURE: 116 MMHG

## 2022-06-09 DIAGNOSIS — R14.0 ABDOMINAL DISTENSION (GASEOUS): ICD-10-CM

## 2022-06-09 DIAGNOSIS — G47.00 INSOMNIA, UNSPECIFIED TYPE: ICD-10-CM

## 2022-06-09 DIAGNOSIS — E55.9 VITAMIN D DEFICIENCY: ICD-10-CM

## 2022-06-09 DIAGNOSIS — Z60.4 SOCIAL ISOLATION: ICD-10-CM

## 2022-06-09 DIAGNOSIS — B00.2 ORAL HERPES: ICD-10-CM

## 2022-06-09 DIAGNOSIS — M25.571 PAIN IN JOINT INVOLVING ANKLE AND FOOT, RIGHT: ICD-10-CM

## 2022-06-09 DIAGNOSIS — F33.3 SEVERE EPISODE OF RECURRENT MAJOR DEPRESSIVE DISORDER, WITH PSYCHOTIC FEATURES (H): Primary | ICD-10-CM

## 2022-06-09 DIAGNOSIS — R42 DIZZINESS: ICD-10-CM

## 2022-06-09 DIAGNOSIS — F43.10 PTSD (POST-TRAUMATIC STRESS DISORDER): ICD-10-CM

## 2022-06-09 DIAGNOSIS — F34.1 PERSISTENT DEPRESSIVE DISORDER WITH ANXIOUS DISTRESS, CURRENTLY MODERATE: ICD-10-CM

## 2022-06-09 DIAGNOSIS — B18.1 CHRONIC VIRAL HEPATITIS B WITHOUT DELTA AGENT AND WITHOUT COMA (H): ICD-10-CM

## 2022-06-09 LAB
ALBUMIN SERPL-MCNC: 3.4 G/DL (ref 3.5–5)
ALP SERPL-CCNC: 42 U/L (ref 45–120)
ALT SERPL W P-5'-P-CCNC: 15 U/L (ref 0–45)
ANION GAP SERPL CALCULATED.3IONS-SCNC: 8 MMOL/L (ref 5–18)
AST SERPL W P-5'-P-CCNC: 16 U/L (ref 0–40)
BILIRUB DIRECT SERPL-MCNC: <0.1 MG/DL
BILIRUB SERPL-MCNC: 0.2 MG/DL (ref 0–1)
BUN SERPL-MCNC: 22 MG/DL (ref 8–22)
CALCIUM SERPL-MCNC: 8.7 MG/DL (ref 8.5–10.5)
CHLORIDE BLD-SCNC: 107 MMOL/L (ref 98–107)
CO2 SERPL-SCNC: 24 MMOL/L (ref 22–31)
CREAT SERPL-MCNC: 0.72 MG/DL (ref 0.6–1.1)
ERYTHROCYTE [DISTWIDTH] IN BLOOD BY AUTOMATED COUNT: 14.2 % (ref 10–15)
GFR SERPL CREATININE-BSD FRML MDRD: >90 ML/MIN/1.73M2
GLUCOSE BLD-MCNC: 82 MG/DL (ref 70–125)
HCT VFR BLD AUTO: 38.2 % (ref 35–47)
HGB BLD-MCNC: 12.1 G/DL (ref 11.7–15.7)
MCH RBC QN AUTO: 30.4 PG (ref 26.5–33)
MCHC RBC AUTO-ENTMCNC: 31.7 G/DL (ref 31.5–36.5)
MCV RBC AUTO: 96 FL (ref 78–100)
PLATELET # BLD AUTO: 173 10E3/UL (ref 150–450)
POTASSIUM BLD-SCNC: 3.9 MMOL/L (ref 3.5–5)
PROT SERPL-MCNC: 7.2 G/DL (ref 6–8)
RBC # BLD AUTO: 3.98 10E6/UL (ref 3.8–5.2)
SODIUM SERPL-SCNC: 139 MMOL/L (ref 136–145)
TSH SERPL DL<=0.005 MIU/L-ACNC: 0.84 UIU/ML (ref 0.3–5)
WBC # BLD AUTO: 5.7 10E3/UL (ref 4–11)

## 2022-06-09 PROCEDURE — 84443 ASSAY THYROID STIM HORMONE: CPT | Performed by: FAMILY MEDICINE

## 2022-06-09 PROCEDURE — 86707 HEPATITIS BE ANTIBODY: CPT | Mod: 90 | Performed by: FAMILY MEDICINE

## 2022-06-09 PROCEDURE — 80053 COMPREHEN METABOLIC PANEL: CPT | Performed by: FAMILY MEDICINE

## 2022-06-09 PROCEDURE — 82105 ALPHA-FETOPROTEIN SERUM: CPT | Performed by: FAMILY MEDICINE

## 2022-06-09 PROCEDURE — 99214 OFFICE O/P EST MOD 30 MIN: CPT | Performed by: FAMILY MEDICINE

## 2022-06-09 PROCEDURE — 82248 BILIRUBIN DIRECT: CPT | Performed by: FAMILY MEDICINE

## 2022-06-09 PROCEDURE — 87350 HEPATITIS BE AG IA: CPT | Mod: 90 | Performed by: FAMILY MEDICINE

## 2022-06-09 PROCEDURE — 99000 SPECIMEN HANDLING OFFICE-LAB: CPT | Performed by: FAMILY MEDICINE

## 2022-06-09 PROCEDURE — 87517 HEPATITIS B DNA QUANT: CPT | Performed by: FAMILY MEDICINE

## 2022-06-09 PROCEDURE — 36415 COLL VENOUS BLD VENIPUNCTURE: CPT | Performed by: FAMILY MEDICINE

## 2022-06-09 PROCEDURE — 82306 VITAMIN D 25 HYDROXY: CPT | Performed by: FAMILY MEDICINE

## 2022-06-09 PROCEDURE — 85027 COMPLETE CBC AUTOMATED: CPT | Performed by: FAMILY MEDICINE

## 2022-06-09 RX ORDER — VALACYCLOVIR HYDROCHLORIDE 1 G/1
1000 TABLET, FILM COATED ORAL 2 TIMES DAILY
Qty: 10 TABLET | Refills: 0 | Status: SHIPPED | OUTPATIENT
Start: 2022-06-09 | End: 2022-08-30

## 2022-06-09 RX ORDER — CHOLECALCIFEROL (VITAMIN D3) 50 MCG
1 TABLET ORAL DAILY
Qty: 90 TABLET | Refills: 3 | Status: SHIPPED | OUTPATIENT
Start: 2022-06-09 | End: 2022-11-10

## 2022-06-09 SDOH — SOCIAL STABILITY - SOCIAL INSECURITY: SOCIAL EXCLUSION AND REJECTION: Z60.4

## 2022-06-09 NOTE — PROGRESS NOTES
ASSESSMENT/PLAN:   Silvino was seen today for follow up and insomnia.    Diagnoses and all orders for this visit:    Severe episode of recurrent major depressive disorder, with psychotic features (H)  Persistent depressive disorder with anxious distress, currently moderate  PTSD (post-traumatic stress disorder)  Insomnia, unspecified type  Social isolation  Has not seen psych yet, multiple no shows. Rescheduled 6/27 at 10am and ride is scheduled. Reminded patient of this visit.     Dizziness  Oral herpes  I do not think her medicaitons are triggering this. Will treat with valtrex due to the severity. Will check labs today as well.   -     valACYclovir (VALTREX) 1000 mg tablet; Take 1 tablet (1,000 mg) by mouth 2 times daily for 5 days  -     CBC with platelets; Future  -     Comprehensive metabolic panel (BMP + Alb, Alk Phos, ALT, AST, Total. Bili, TP); Future  -     TSH with free T4 reflex; Future  -     CBC with platelets  -     Comprehensive metabolic panel (BMP + Alb, Alk Phos, ALT, AST, Total. Bili, TP)  -     TSH with free T4 reflex    Vitamin D deficiency  patietn requested  -     vitamin D3 (CHOLECALCIFEROL) 50 mcg (2000 units) tablet; Take 1 tablet (50 mcg) by mouth daily  - Check vit d levels    Pain in joint involving ankle and foot, right  -     diclofenac (VOLTAREN) 1 % topical gel; Apply 2 g topically 4 times daily as needed for moderate pain (right ankle pain)    Abdominal distension (gaseous)  Chronic viral hepatitis B without delta-agent (H)  Labs ordered by GI.   -     AFP tumor marker  -     Hep B Virus DNA Quant Real Time PCR  -     Hepatitis Be antibody  -     Hepatitis Be antigen  -     Cancel: Hepatic function panel  -     Bilirubin direct      Return in about 3 months (around 9/9/2022) for Medication Check, depression/anxiety.       ======================================================    SUBJECTIVE  Silvino Caceres is a 42 year old female here for follow up mood    Increased celexa at last visit.  "  Continued quetiapine for sleep.   She has missed several appt with psychology.   She says because of her illnesses, she feels confused.     Rash on lips stared on Monday. They look more herpetic.   No fevers, nausea, vomiting.     She has some orders placed by GI, will have them done today.     Right ankle swells from time to time.   She sprained it a few months ago.   Sometimes swells when she is on her feet too oftn.     ROS  Complete 10 point review of systems negative except as noted above in HPI      OBJECTIVE  /68 (BP Location: Right arm, Patient Position: Sitting, Cuff Size: Adult Regular)   Pulse 78   Temp 97.4  F (36.3  C) (Oral)   Resp 16   Ht 1.549 m (5' 1\")   Wt 54.9 kg (121 lb)   LMP 06/08/2022   SpO2 99%   BMI 22.86 kg/m       General: Cooperative, pleasant, in no acute distress  HEENT: PERRL, EOMI.  TM normal bilaterally.  Pharynx normal without erythema.  Tonsils normal without hypertrophy or exudates.  Neck: no lymphadenopathy, no masses  CV: RRR, normal S1/S2, no murmur, rubs, gallops  Resp: No respiratory distress. Clear to auscultation bilaterally. No wheezes, rales, rhonchi  Neuro: CN II-XII intact  Skin: warm, well perfused. Open sores on mouth, open, non bleeding.   Psych: No suicidal or homicidal ideations, no self-harm.  Normal affect.    LABS & IMAGES   Results for orders placed or performed in visit on 06/09/22   CBC with platelets     Status: Normal   Result Value Ref Range    WBC Count 5.7 4.0 - 11.0 10e3/uL    RBC Count 3.98 3.80 - 5.20 10e6/uL    Hemoglobin 12.1 11.7 - 15.7 g/dL    Hematocrit 38.2 35.0 - 47.0 %    MCV 96 78 - 100 fL    MCH 30.4 26.5 - 33.0 pg    MCHC 31.7 31.5 - 36.5 g/dL    RDW 14.2 10.0 - 15.0 %    Platelet Count 173 150 - 450 10e3/uL         ======================================================    Visit was completed along with Yareli     Options for treatment and follow-up care were reviewed with the patient. Silvino Collado Say and/or guardian " was engaged and actively involved in the decision making process. Ten Nay Say and/or guardian verbalized understanding of the options discussed and was satisfied with the final plan.      John Perales MD

## 2022-06-10 ENCOUNTER — TRANSFERRED RECORDS (OUTPATIENT)
Dept: HEALTH INFORMATION MANAGEMENT | Facility: CLINIC | Age: 42
End: 2022-06-10
Payer: COMMERCIAL

## 2022-06-10 LAB
AFP SERPL-MCNC: 2.1 NG/ML
DEPRECATED CALCIDIOL+CALCIFEROL SERPL-MC: 15 UG/L (ref 20–75)
HBV DNA SERPL NAA+PROBE-ACNC: 7914 IU/ML
HBV DNA SERPL NAA+PROBE-LOG IU: 3.9 {LOG_IU}/ML
HBV E AB SERPL QL IA: POSITIVE
HBV E AG SERPL QL IA: NEGATIVE

## 2022-06-14 ENCOUNTER — TRANSFERRED RECORDS (OUTPATIENT)
Dept: HEALTH INFORMATION MANAGEMENT | Facility: CLINIC | Age: 42
End: 2022-06-14
Payer: COMMERCIAL

## 2022-06-22 ENCOUNTER — PATIENT OUTREACH (OUTPATIENT)
Dept: CARE COORDINATION | Facility: CLINIC | Age: 42
End: 2022-06-22

## 2022-06-22 NOTE — PROGRESS NOTES
Clinic Care Coordination Contact  [facility]    Clinical Data: Care Coordinator Outreach  Outreach attempted x 1. Unable to obtain a Yareli . Plan:Care Coordinator will try to reach patient again in 3-5 business days.    Tammi Whitehead RN, Casual Care Coordinator  [facility] Care Coordination  [phone]

## 2022-06-24 ENCOUNTER — PATIENT OUTREACH (OUTPATIENT)
Dept: CARE COORDINATION | Facility: CLINIC | Age: 42
End: 2022-06-24

## 2022-06-24 NOTE — PROGRESS NOTES
Care Coordination Clinician Chart Review     Situation: Patient chart reviewed by care coordinator.?     Background: Initial assessment and enrollment to Care Coordination was 10/12/2021.?? Patient centered goals were developed with participation from patient.? Lead CC handed patient off to CHW for continued outreach every 30 days.??     Assessment: Per chart review, patient outreach completed by CC CHW on 5/31/2022.? Patient is actively working to accomplish goal(s).? Patient's goal(s) remain(s) appropriate at this time.? Patient is due for updated Plan of Care.? Annual assessment will be due 10/12/2022. Per chart review, patient is scheduled with a therapist on 6/27/2022 at 10am. Ride set up by CHW through [employer].     Addendum note on 7/7/2022: Per CHW, patient attended her therapy appt on 6/27/22 and follow up appt scheduled on 7/18/222 and 8/1/22. CHW already set up transportation. Psychiatrist appt scheduled on 9/12/2022.      Goals        1. Therapy (pt-stated)       Goal Statement: I want to establish care with a Therapist in the next 90 days so that I can better manage my mental health symptoms and stressors.    Date Goal set: 3/31/2022  Barriers: language barrier  Strengths: Willing to schedule  Date to Achieve By: 6/30/2022  Patient expressed understanding of goal: Yes    Action steps to achieve this goal:  1. I will answer my phone when I am contacted to schedule an intake appointment with a Therapist.  2. I will attend my initial Therapy appointment and schedule on on 4/7/2022 at 12:00pm with Angelica Barrett. (Patient no showed and rescheduled on 5/11/2022 at 11:00 AM and no showed) Rescheduled on 6/27/2022 at 10 AM and ride arranged with [employer].  3. I will follow up with CCC in the next month regarding this goal.    Note: Patient has difficulty with accessing phone to connect video appointment and CHW assisted with in-person appointment.    [facility]  [address]  [place] [address]  [phone].    Goal update: 5/31/2022.          ??     Plan/Recommendations: Patient has been transitioned to maintenance.     Plan of Care updated and sent to patient: Yes

## 2022-06-24 NOTE — LETTER
[facility]  Patient Centered Plan of Care  About Me:        Patient Name:  Silvino Caceres    YOB: 1980  Age:         42 year old   Yordan MRN:    [id number] Telephone Information:  Home Phone [phone]   Mobile [phone]       Address:  [address]  [place] [address] Email address:  No e-mail address on record      Emergency Contact(s)    Name Relationship Lgl Grd Work Phone Home Phone Mobile Phone   1. JAQUI ARCE Aunt    [phone]           Primary language:  Yareli     needed? Yes   [facility]:  [phone] op. 1  Other communication barriers:Language barrier; Lack of coping    Preferred Method of Communication:     Current living arrangement: I live in a private home with family    Mobility Status/ Medical Equipment: Independent        Health Maintenance  Health Maintenance Reviewed: No data recorded    My Access Plan  Medical Emergency 911   Primary Clinic Line [facility] - [phone]   24 Hour Appointment Line [phone] or  [phone] ([phone]) (toll-free)   24 Hour Nurse Line [phone] (toll-free)   Preferred Urgent Care [facility] [phone]     [facility]  [phone]     Preferred Pharmacy [facility] - [address]     Behavioral Health Crisis Line The National Suicide Prevention Lifeline at 1-120.395.3336 or 911             My Care Team Members  Patient Care Team       Relationship Specialty Notifications Start End    John Perales MD PCP - General Family Medicine Abnormal results only, Admissions 9/2/21     Phone: [phone] Fax: [phone]         [address] [facility] [address] [place] [address]    John Perales MD Assigned PCP   6/16/21     Phone: [phone] Fax: [phone]         [address] [facility] [address] [place] [address]    Akua Springer RN Lead Care Coordinator Primary Care - CC Admissions  10/12/21     Brenton Bennett Community Health Worker Primary Care - CC Admissions 11/1/21     Phone: [phone] Fax: [phone]         [address] [facility] [address] [facility] [address]    Denis Black MD MD Internal Medicine  1/11/22     last seen on 12/29/21. ( to return in 6 months - to be seen every 6 months until she's 44.     Phone: [phone] Fax: [phone]         [place] [facility] [address] [facility] [address] [facility] [address]            My Care Plans  Self Management and Treatment Plan  Goals and (Comments)   Goals        General     1. Therapy (pt-stated)      Notes - Note edited  5/31/2022  3:25 PM by Brentno Bennett     Goal Statement: I want to establish care with a Therapist in the next 90 days so that I can better manage my mental health symptoms and stressors.    Date Goal set: 3/31/2022  Barriers: language barrier  Strengths: Willing to schedule  Date to Achieve By: 6/30/2022  Patient expressed understanding of goal: Yes    Action steps to achieve this goal:  1. I will answer my phone when I am contacted to schedule an intake appointment with a Therapist.  2. I will attend my initial Therapy appointment and schedule on on 4/7/2022 at 12:00pm with Angelica Barrett. (Patient no showed and rescheduled on 5/11/2022 at 11:00 AM and no showed) Rescheduled on 6/27/2022 at 10 AM and ride arranged with [employer].  3. I will follow up with [facility] in the next month regarding this goal.    Note: Patient has difficulty with accessing phone to connect video appointment and CHW assisted with in-person appointment.    [email or web address]  [address]  [place] [address]  [phone].    Goal update: 5/31/2022.               Action Plans on File:                       Advance Care Plans/Directives Type:   No data recorded    My Medical and Care Information  Problem List   Patient Active Problem List   Diagnosis     Insomnia, unspecified type     Chronic viral hepatitis B without delta agent and without coma  (H)     Hyperlipidemia     Vitamin D deficiency     Severe episode of recurrent major depressive disorder, with psychotic features (H)     PTSD (post-traumatic stress disorder)     Persistent depressive disorder with anxious distress, currently moderate     Chronic pain of both shoulders     Gastroesophageal reflux disease without esophagitis     Itching     Social isolation      Current Medications and Allergies:  See printed Medication Report.    Care Coordination Start Date: 10/12/2021   Frequency of Care Coordination: 6 weeks     Form Last Updated: 06/24/2022

## 2022-07-06 ENCOUNTER — PATIENT OUTREACH (OUTPATIENT)
Dept: CARE COORDINATION | Facility: CLINIC | Age: 42
End: 2022-07-06

## 2022-07-06 NOTE — PROGRESS NOTES
Clinic Care Coordination Contact    Follow Up Progress Note     Care Gaps:  There are no preventive care reminders to display for this patient.    Goals addressed this encounter:    Goals Addressed                    This Visit's Progress       COMPLETED: 1. Therapy (pt-stated)   100%      Goal Statement: I want to establish care with a Therapist in the next 90 days so that I can better manage my mental health symptoms and stressors.    Date Goal set: 3/31/2022  Barriers: language barrier  Strengths: Willing to schedule  Date to Achieve By: 6/30/2022  Patient expressed understanding of goal: Yes    Action steps to achieve this goal:  1. I will answer my phone when I am contacted to schedule an intake appointment with a Therapist.  2. I will attend my initial Therapy appointment and schedule on on 4/7/2022 at 12:00pm with Angelica Barrett. (Patient no showed and rescheduled on 5/11/2022 at 11:00 AM and no showed) Rescheduled on 6/27/2022 at 10 AM and ride arranged with [employer], (Completed).  3. I will attend my follow up appointment with therapist again as scheduled on 7/18/2022 at 12:00 PM and 8/01/2022 at 11:00 AM. (Ride arranged with [employer]).     Note: Patient has psychiatrist appointment on 9/12/2022 at 10:00 AM.     [facility]  [address]  [place] [address]  [phone].    Goal update: 7/06/2022

## 2022-07-06 NOTE — PROGRESS NOTES
Clinic Care Coordination Contact  Patient has completed all goals with Clinic Care Coordination.  Please review the chart and confirm if maintenance  is approved.

## 2022-07-07 NOTE — TELEPHONE ENCOUNTER
Patient was given , Emilia's direct contact if she needs additional ride for upcoming appointments.

## 2022-07-07 NOTE — TELEPHONE ENCOUNTER
Okay to move patient to maintenance as of today. I addendum my notes dated on 6/24/2022. Can we figure out who will assist her with ride to her therapy and psychiatry appt after graduation?

## 2022-08-15 ENCOUNTER — PATIENT OUTREACH (OUTPATIENT)
Dept: CARE COORDINATION | Facility: CLINIC | Age: 42
End: 2022-08-15

## 2022-08-15 NOTE — PROGRESS NOTES
Clinic Care Coordination Contact    Follow Up Progress Note      Assessment: CCRN spoke with patient today prior to graduation to make sure she knows who to reach out to set up transportation for her psychiatry/therapy appt with [facility]. Per patient, she tried calling the clinic  multiple times in the past but couldn't get thorough so she gave up. Patient would like assist from [facility] to set up ride for her appt on 9/12/2022. New goal created today and moved patient back to active. Per patient, she's no longer working with [employer] and open to [employer] worker. [facility] team needs to identify someone to assist patient with appt reminder and transportation especially for her psychaitry and therapy appt prior to graduation.     Care Gaps:    There are no preventive care reminders to display for this patient.    Goals addressed this encounter:    Goals Addressed                    This Visit's Progress       1. [facility] (pt-stated)   10%      Goal Statement: I will attend my follow-up appointment with my therapist and psychiatrist in the next 4 months so that I can better manage my mental health symptoms and stressors.    Date Goal set: 8/15/2022  Barriers: language barrier  Strengths: Willing to schedule  Date to Achieve By: 12/31/2022  Patient expressed understanding of goal: Yes    Action steps to achieve this goal:  1. I will answer my phone when I am contacted to schedule an intake appointment with a Therapist.  2. I will attend my follow up appt with a psychiatrist/therapist on 9/12/2022 at 10:00am. CHW to assist with transportation.     [facility]  [address]  [place] [address]  [phone].             Outreach Frequency: 6 weeks    Plan:   1) CHW to assist patient with transportation for her appt on 9/12/2022.

## 2022-08-21 ENCOUNTER — PATIENT OUTREACH (OUTPATIENT)
Dept: CARE COORDINATION | Facility: CLINIC | Age: 42
End: 2022-08-21

## 2022-08-21 NOTE — PROGRESS NOTES
Clinic Care Coordination - Chart Review Only    Situation/Background: Patient chart reviewed by care coordinator related to [employer] [facility] conversion.    Assessment: Patient continues to be followed by Clinic Care Coordination.    Plan: Patient's chart updated to align with [employer] program for ongoing patient management.

## 2022-08-30 ENCOUNTER — OFFICE VISIT (OUTPATIENT)
Dept: FAMILY MEDICINE | Facility: CLINIC | Age: 42
End: 2022-08-30
Payer: COMMERCIAL

## 2022-08-30 VITALS
DIASTOLIC BLOOD PRESSURE: 70 MMHG | TEMPERATURE: 98.3 F | WEIGHT: 119.8 LBS | HEIGHT: 61 IN | HEART RATE: 70 BPM | BODY MASS INDEX: 22.62 KG/M2 | RESPIRATION RATE: 12 BRPM | OXYGEN SATURATION: 98 % | SYSTOLIC BLOOD PRESSURE: 110 MMHG

## 2022-08-30 DIAGNOSIS — F34.1 PERSISTENT DEPRESSIVE DISORDER WITH ANXIOUS DISTRESS, CURRENTLY MODERATE: ICD-10-CM

## 2022-08-30 DIAGNOSIS — F43.10 PTSD (POST-TRAUMATIC STRESS DISORDER): ICD-10-CM

## 2022-08-30 DIAGNOSIS — F33.3 SEVERE EPISODE OF RECURRENT MAJOR DEPRESSIVE DISORDER, WITH PSYCHOTIC FEATURES (H): ICD-10-CM

## 2022-08-30 DIAGNOSIS — G47.00 INSOMNIA, UNSPECIFIED TYPE: Primary | ICD-10-CM

## 2022-08-30 PROCEDURE — 99214 OFFICE O/P EST MOD 30 MIN: CPT | Performed by: FAMILY MEDICINE

## 2022-08-30 RX ORDER — MULTIVIT-MIN/IRON/FOLIC ACID/K 18-600-40
CAPSULE ORAL
COMMUNITY
Start: 2022-06-09 | End: 2022-08-30

## 2022-08-30 RX ORDER — CITALOPRAM HYDROBROMIDE 20 MG/1
20 TABLET ORAL EVERY MORNING
Qty: 30 TABLET | Refills: 3 | Status: SHIPPED | OUTPATIENT
Start: 2022-08-30 | End: 2022-11-10

## 2022-08-30 ASSESSMENT — PATIENT HEALTH QUESTIONNAIRE - PHQ9
SUM OF ALL RESPONSES TO PHQ QUESTIONS 1-9: 10
5. POOR APPETITE OR OVEREATING: MORE THAN HALF THE DAYS

## 2022-08-30 ASSESSMENT — ANXIETY QUESTIONNAIRES
1. FEELING NERVOUS, ANXIOUS, OR ON EDGE: NOT AT ALL
GAD7 TOTAL SCORE: 7
GAD7 TOTAL SCORE: 7
5. BEING SO RESTLESS THAT IT IS HARD TO SIT STILL: MORE THAN HALF THE DAYS
2. NOT BEING ABLE TO STOP OR CONTROL WORRYING: SEVERAL DAYS
7. FEELING AFRAID AS IF SOMETHING AWFUL MIGHT HAPPEN: NOT AT ALL
3. WORRYING TOO MUCH ABOUT DIFFERENT THINGS: SEVERAL DAYS
6. BECOMING EASILY ANNOYED OR IRRITABLE: SEVERAL DAYS
IF YOU CHECKED OFF ANY PROBLEMS ON THIS QUESTIONNAIRE, HOW DIFFICULT HAVE THESE PROBLEMS MADE IT FOR YOU TO DO YOUR WORK, TAKE CARE OF THINGS AT HOME, OR GET ALONG WITH OTHER PEOPLE: SOMEWHAT DIFFICULT

## 2022-08-30 NOTE — PROGRESS NOTES
Assessment & Plan     Insomnia, unspecified type  Patient stopped quetiapine due to side effects. She is seeing psychiatry in 2 weeks, encouraged her to ask about this. She has self discontinued mirtazapine, quetiapine, amitriptyline. Can consider trazodone?     PTSD (post-traumatic stress disorder)  Severe episode of recurrent major depressive disorder, with psychotic features (H)  Persistent depressive disorder with anxious distress, currently moderate  Continue celexa, follow up with psychiatry for other suggestions.   - citalopram (CELEXA) 20 MG tablet  Dispense: 30 tablet; Refill: 3      Review of external notes as documented elsewhere in note      Return in about 3 months (around 11/30/2022) for Routine preventive, with me.    30 minutes spent on the date of the encounter doing chart review, history and exam, documentation and further activities per the note    Visit was completed along with aYreli prasad     Options for treatment and follow-up care were reviewed with the patient. Silvino Collado Say and/or guardian was engaged and actively involved in the decision making process. Silvino Caceres and/or guardian verbalized understanding of the options discussed and was satisfied with the final plan.    John Perales MD  [facility]    Don Dubois is a 42 year old, presenting for the following health issues:  Depression and Insomnia      HPI     Depression.   Difficulty with transportation/communication. She was scheduled to see psych on 6/27/22 with ride arranged. CCC is involved, patient did attend therapy session on 6/27. Had follow up scheduled 7/18 and 8/1. Psychiatrist appt on 9/12.     She says she does not think therapy has been helping, but she does not elaborate why. I asked if she thought it was hurting or doing anything negative, she just smiles.     She says she does not sleep well, only a few hours per night.   She has quetiapine on her medication list, but only 3 months  "starting 5/5/22. She would be out. She says it makes her feel like she has tingling in her hands, so she stopped it.   She has tried quetiapine, amitriptyline, and mirtazapine for sleep and reports side effects or not working. Can consider trazodone    The sores on her mouth have gone away.     She says she is having dizziness and lightheadedness, lower abdominal pain. It's been going on all the time.     Review of Systems   Constitutional, HEENT, cardiovascular, pulmonary, gi and gu systems are negative, except as otherwise noted.      Objective    /70   Pulse 70   Temp 98.3  F (36.8  C) (Oral)   Resp 12   Ht 1.548 m (5' 0.95\")   Wt 54.3 kg (119 lb 12.8 oz)   LMP 08/20/2022 (Approximate)   SpO2 98%   BMI 22.68 kg/m    Body mass index is 22.68 kg/m .  Physical Exam   GENERAL: healthy, alert and no distress  EYES: Eyes grossly normal to inspection, PERRL and conjunctivae and sclerae normal  HENT: ear canals and TM's normal, nose and mouth without ulcers or lesions  NECK: no adenopathy, no asymmetry, masses, or scars and thyroid normal to palpation  RESP: lungs clear to auscultation - no rales, rhonchi or wheezes  CV: regular rate and rhythm, normal S1 S2, no S3 or S4, no murmur, click or rub, no peripheral edema and peripheral pulses strong  ABDOMEN: soft, nontender, no hepatosplenomegaly, no masses and bowel sounds normal  MS: no gross musculoskeletal defects noted, no edema  SKIN: no suspicious lesions or rashes  NEURO: Normal strength and tone, mentation intact and speech normal  PSYCH: mentation appears normal, affect normal/bright    No results found for this or any previous visit (from the past 24 hour(s)).                .  ..  "

## 2022-09-14 ENCOUNTER — PATIENT OUTREACH (OUTPATIENT)
Dept: CARE COORDINATION | Facility: CLINIC | Age: 42
End: 2022-09-14

## 2022-09-14 NOTE — TELEPHONE ENCOUNTER
Hi Brenton, hold off on moving her to maintenance. I added her on my schedule this Friday to follow up with her post ED visit. She was sent to the ED by [facility] so they had some concerns. She might be mad at [facility] being sent the ED by them that's why she refused to schedule appt with [facility].     I will follow up with her on Friday.     Thank you for the updates.    no

## 2022-09-14 NOTE — PROGRESS NOTES
Clinic Care Coordination Contact  Patient has completed all goals with Clinic Care Coordination.  Please review the chart and confirm if maintenance  is approved.    -Patient went to see psychiatrist on 9/12/2022 at [facility] and was sent to ED at [facility].   -CHW assisted patient with ED/Hospital follow up appointment on 9/29/2022 and no ride needed.   -CHW insisted patient to schedule follow up appointments with psychiatrist and therapist at [facility] and patient declined.  -Patient stated she does not want to follow up with psychiatrist and therapist.  -Patient stated she'll just follow up with PCP.   -CHW routes this outreach encounter to CCC RN for further chart review and place on maintenance as appropriate.   -Patient to call with additional coordination assistance when needed.

## 2022-09-16 ENCOUNTER — PATIENT OUTREACH (OUTPATIENT)
Dept: NURSING | Facility: CLINIC | Age: 42
End: 2022-09-16

## 2022-09-16 NOTE — LETTER
[facility]  Patient Centered Plan of Care  About Me:        Patient Name:  Silvino Caceres    YOB: 1980  Age:         42 year old   Yordan MRN:    [id number] Telephone Information:  Home Phone [phone]   Mobile [phone]       Address:  [address]  [place] [address] Email address:  No e-mail address on record      Emergency Contact(s)    Name Relationship Lgl Grd Work Phone Home Phone Mobile Phone   1. JAQUI ARCE Aunt    [phone]           Primary language:  Yareli     needed? Yes   [place] [facility]:  [phone] op. 1  Other communication barriers:Language barrier; Lack of coping    Preferred Method of Communication:     Current living arrangement: I live in a private home with family    Mobility Status/ Medical Equipment: Independent        Health Maintenance  Health Maintenance Reviewed: No data recorded    My Access Plan  Medical Emergency 911   Primary Clinic Line [facility] - [phone]   24 Hour Appointment Line [phone] or  [phone]) (toll-free)   24 Hour Nurse Line [phone] (toll-free)   Preferred Urgent Care [facility] [phone]     [facility]  [phone]     Preferred Pharmacy [facility] - [address]     Behavioral Health Crisis Line The National Suicide Prevention Lifeline at 1-389.541.9441 or Text/Call 528             My Care Team Members  Patient Care Team       Relationship Specialty Notifications Start End    John Perales MD PCP - General Family Medicine Abnormal results only, Admissions 9/2/21     Phone: [phone] Fax: [phone]         [address] [facility] [address] [place] [address]    John Perales MD Assigned PCP   6/16/21     Phone: [phone] Fax: [phone]         [address] [facility] [address] [place] [address]    Akua Springer, RN Lead Care Coordinator Primary Care - CC  Admissions 10/12/21     Brenton Bennett Community Health Worker Primary Care - CC Admissions 10/12/21     Phone: [phone] Fax: [phone]         [address] [facility] [address] [facility] [address]    Denis Black MD MD Internal Medicine  1/11/22     last seen on 12/29/21. ( to return in 6 months - to be seen every 6 months until she's 44.     Phone: [phone] Fax: [phone]         [place] [facility] [address] [facility] [address] [facility] [address]            My Care Plans  Self Management and Treatment Plan  Care Plan  Care Plan: Medication compliance     Problem: MEV     Goal: General Goal - please update text     Note:     Goal Statement: I to learn how to set up my medications correctly by working with the CCC RN over the next 90 days.    Date goal set: 9/16/22  Barriers: language barrier  Strengths: Agrees to work on goal  Date to Achieve By: 12/31/2022  Patient expressed understanding of goal: Yes    Action steps to achieve this goal  1. I will meet with the CCC RN in the clinic on 9/29/2022 at 10:00am.   2. I will take my medications as prescribed.  3. I/daughter/son will call the CCC RN with concerns or questions.                            Action Plans on File:                       Advance Care Plans/Directives Type:   No data recorded    My Medical and Care Information  Problem List   Patient Active Problem List   Diagnosis     Insomnia, unspecified type     Chronic viral hepatitis B without delta agent and without coma (H)     Hyperlipidemia     Vitamin D deficiency     Severe episode of recurrent major depressive disorder, with psychotic features (H)     PTSD (post-traumatic stress disorder)     Persistent depressive disorder with anxious distress, currently moderate     Chronic pain of both shoulders     Gastroesophageal reflux disease without esophagitis     Itching     Social isolation      Current Medications and Allergies:  See printed Medication Report.    Care Coordination Start Date: 10/12/2021    Frequency of Care Coordination: 6 weeks     Form Last Updated: 09/28/2022

## 2022-09-16 NOTE — PROGRESS NOTES
"Clinic Care Coordination Contact    Follow Up Progress Note      Assessment: CCRN spoke with patient today via phone. Per patient, she attended her psychiatry appt as scheduled on 9/12/022 but the she was sent to the ED because she told them about her suicidal thoughts. Patient declined to go back to see her psychiatrist stating that \" I don't want to go back to the ED\". Patient states she has suicidal thoughts for years and it's not new but now she struggles with insomnia. She wishes she could sleep more than 2 hours per night. Patient was seen by PCP on 8/30/2022 for insomnia. Patient states her current medicines don't help her with sleep. Patient has upcoming appt with Dr Moore on 9/29/22 and she would like to meet with RICHIEN in person to discuss if she would like to return to see a her psychiatrist.     Care Gaps:    Health Maintenance Due   Topic Date Due     INFLUENZA VACCINE (1) 09/01/2022     PREVENTIVE CARE VISIT  10/04/2022       12/12/22 with PCP    Care Plans  Care Plan: Medication compliance     Problem: MEV     Goal: General Goal - please update text     Note:     Goal Statement: I to learn how to set up my medications correctly by working with the CCC RN over the next 90 days.    Date goal set: 9/16/22  Barriers: language barrier  Strengths: Agrees to work on goal  Date to Achieve By: 12/31/2022  Patient expressed understanding of goal: Yes    Action steps to achieve this goal  1. I will meet with the CCC RN in the clinic on 9/29/2022 at 10:00am.   2. I will take my medications as prescribed.  3. I/daughter/son will call the CCC RN with concerns or questions.                           Outreach Frequency: 6 weeks    Plan:   1) CHW to hold off on moving patient to maintenance.  2) Patient will attend her appt with Dr Moore and CASSIDY on 9/29/2022.   "

## 2022-09-29 ENCOUNTER — ALLIED HEALTH/NURSE VISIT (OUTPATIENT)
Dept: NURSING | Facility: CLINIC | Age: 42
End: 2022-09-29
Payer: COMMERCIAL

## 2022-09-29 ENCOUNTER — OFFICE VISIT (OUTPATIENT)
Dept: FAMILY MEDICINE | Facility: CLINIC | Age: 42
End: 2022-09-29
Payer: COMMERCIAL

## 2022-09-29 VITALS
SYSTOLIC BLOOD PRESSURE: 100 MMHG | BODY MASS INDEX: 22.66 KG/M2 | HEIGHT: 61 IN | RESPIRATION RATE: 12 BRPM | WEIGHT: 120 LBS | HEART RATE: 68 BPM | OXYGEN SATURATION: 100 % | DIASTOLIC BLOOD PRESSURE: 68 MMHG | TEMPERATURE: 98.5 F

## 2022-09-29 DIAGNOSIS — Z71.89 COMPLEX CARE COORDINATION: Primary | ICD-10-CM

## 2022-09-29 DIAGNOSIS — B00.2 ORAL HERPES: ICD-10-CM

## 2022-09-29 DIAGNOSIS — L29.9 PRURITIC DISORDER: Primary | ICD-10-CM

## 2022-09-29 DIAGNOSIS — Z23 NEED FOR IMMUNIZATION AGAINST INFLUENZA: ICD-10-CM

## 2022-09-29 DIAGNOSIS — F51.01 PRIMARY INSOMNIA: ICD-10-CM

## 2022-09-29 DIAGNOSIS — F33.3 SEVERE EPISODE OF RECURRENT MAJOR DEPRESSIVE DISORDER, WITH PSYCHOTIC FEATURES (H): ICD-10-CM

## 2022-09-29 DIAGNOSIS — R42 DIZZINESS: ICD-10-CM

## 2022-09-29 PROCEDURE — 99207 PR NO CHARGE LOS: CPT

## 2022-09-29 PROCEDURE — 90471 IMMUNIZATION ADMIN: CPT | Performed by: FAMILY MEDICINE

## 2022-09-29 PROCEDURE — 90686 IIV4 VACC NO PRSV 0.5 ML IM: CPT | Performed by: FAMILY MEDICINE

## 2022-09-29 PROCEDURE — 99215 OFFICE O/P EST HI 40 MIN: CPT | Mod: 25 | Performed by: FAMILY MEDICINE

## 2022-09-29 RX ORDER — CETIRIZINE HYDROCHLORIDE 10 MG/1
10 TABLET ORAL DAILY
Qty: 30 TABLET | Refills: 3 | Status: SHIPPED | OUTPATIENT
Start: 2022-09-29 | End: 2022-11-10

## 2022-09-29 RX ORDER — TRAZODONE HYDROCHLORIDE 50 MG/1
50 TABLET, FILM COATED ORAL AT BEDTIME
Qty: 30 TABLET | Refills: 1 | Status: SHIPPED | OUTPATIENT
Start: 2022-09-29 | End: 2022-11-10

## 2022-09-29 NOTE — PROGRESS NOTES
Assessment & Plan     Pruritic disorder  She is complaining of itchiness on her eyelids her cheeks and her body no definitive rash noted I have prescribed cetirizine potential side effects discussed.  - cetirizine (ZYRTEC) 10 MG tablet; Take 1 tablet (10 mg) by mouth daily    Severe episode of recurrent major depressive disorder, with psychotic features (H)    Seen at a mental health clinic and then sent to [facility] for urgent evaluation.  I do not have access to those records she states that she was asked many questions and then released she denies having any thoughts of self-harm.  She denies being suicidal.  She states she does not want to take any the medications previously prescribed by her primary care physician she states that she did not think those medications helped and she states she is not sad or feeling like she wants to hurt her self at this time.  She is willing to meet with the nurse who had attempted to set up a program for her today in clinic.  She is willing to see her primary care doctor again.  She was able to see the clinic  Akua Walters RN today.    Primary insomnia    Says she does not sleep well she was somnolent today during the examination she slept only 1-1/2 hours last night.  She said the other sleep medication did not work well for her I offered her trazodone and discussed the potential side effects she will try it.  - traZODone (DESYREL) 50 MG tablet; Take 1 tablet (50 mg) by mouth At Bedtime    Oral herpes    Was given treatment for herpes infection that mainly affected her lips and says that it was completed and she still has a lesion on the left upper lip she says not burning or hurting at this time and she does not want to restart medication    Dizziness    Patient complains of being dizzy no recent falls no headaches reported reviewed past results which included an unremarkable CMP possibilities include sleep deprivation and poor hydration schedule.  She will try to  "drink more water daily and she is thinking if she sleeps regularly the dizziness may past    Need for immunization against influenza    She agrees for the immunization today      FUTURE APPOINTMENTS:       - Follow-up visit in 2-4 weeks    Return in about 4 weeks (around 10/27/2022) for insomnia, depression.    Mark Moore MD  [facility] JAREN Dubois is a 42 year old, presenting for the following health issues:  No chief complaint on file.    History of presenting illness  42-year-old female here for follow-up.  She was seen in the emergency room after being evaluated at her mental health visit.  She reports that she left because she did not want to be admitted.  Patient has a history of significant major depression.  She states that she is stopped all her medications last month.  She states that she is occasionally feels dizzy.  She says she is not sleeping well typically she will sleep an hour per night.  She reports her appetite is normal she still has problems with \"\" sores on her lips but does not want another prescription.  She says she is eating normally.  Patient states that she does not know why she is here and would like to see her doctor in the future.  She will is willing to have an influenza vaccination.  She denies feeling poorly about herself she denies having thoughts of self-harm she denies feeling helpless or hopeless.    HPI           Review of Systems   Constitutional, HEENT, cardiovascular, pulmonary, GI, , musculoskeletal, neuro, skin, endocrine and psych systems are negative, except as otherwise noted.      Objective    LMP 08/20/2022 (Approximate)   There is no height or weight on file to calculate BMI.  Physical Exam   GENERAL: healthy, alert and no distress  EYES: Eyes grossly normal to inspection, PERRL and conjunctivae and sclerae normal  NECK: no adenopathy, no asymmetry, masses, or scars and thyroid normal to palpation  RESP: lungs clear to auscultation - " no rales, rhonchi or wheezes  CV: regular rate and rhythm, normal S1 S2, no S3 or S4, no murmur, click or rub, no peripheral edema and peripheral pulses strong  ABDOMEN: soft, nontender, no hepatosplenomegaly, no masses and bowel sounds normal  MS: no gross musculoskeletal defects noted, no edema  SKIN: 2 erythematous vesicles noted on the vermilion border the left upper lip  NEURO: Normal strength and tone, mentation intact and speech normal  PSYCH: mentation appears normal, affect normal/bright      Approximately 40 minutes were spent discussing and coordinating care for the patient today.

## 2022-09-29 NOTE — PROGRESS NOTES
Clinic Care Coordination Contact    Follow Up Progress Note      Assessment: CCRN met with patient in person today. Patient went to see her psychiatry 1st time on 9/12/22 but then she went sent to the ED from psychiatry clinic. Patient declined to schedule follow-up appt with her psychiatrist stating that she didn't like it when they sent her to the ED. Patient reports she's doing fine and her mental health is stable. Patient declined therapy.  Patient stopped taking all her meds stating that it's not working or make her feel any better. Educated patient on the important of taking her meds as prescribed and to consult with PCP before stopping any meds. Patient has appt with Dr Moore today ED follow-up. Patient already scheduled to follow-up with her PCP on 12/12/2022 at 8:40am arrival time. Patient states she goes to [employer] sometimes and have some friends she talks to.  Patient declined further assist from CCC. Patient states she plans to go back to [place] to live there but not sure it she will be allowed to live there for good. CCRN encouraged patient to call the clinic or CCC with further needs or concerns. Patient verbalized understanding.     Plan: Patient has been graduated from CCC today. Patient declined further assist.

## 2022-11-10 ENCOUNTER — OFFICE VISIT (OUTPATIENT)
Dept: FAMILY MEDICINE | Facility: CLINIC | Age: 42
End: 2022-11-10
Payer: COMMERCIAL

## 2022-11-10 VITALS
BODY MASS INDEX: 22.75 KG/M2 | RESPIRATION RATE: 12 BRPM | DIASTOLIC BLOOD PRESSURE: 64 MMHG | HEART RATE: 64 BPM | TEMPERATURE: 97.8 F | SYSTOLIC BLOOD PRESSURE: 90 MMHG | OXYGEN SATURATION: 98 % | HEIGHT: 61 IN | WEIGHT: 120.5 LBS

## 2022-11-10 DIAGNOSIS — Z23 HIGH PRIORITY FOR 2019 NOVEL CORONAVIRUS VACCINATION: ICD-10-CM

## 2022-11-10 DIAGNOSIS — F33.3 SEVERE EPISODE OF RECURRENT MAJOR DEPRESSIVE DISORDER, WITH PSYCHOTIC FEATURES (H): ICD-10-CM

## 2022-11-10 DIAGNOSIS — L29.9 PRURITIC DISORDER: ICD-10-CM

## 2022-11-10 DIAGNOSIS — B18.1 CHRONIC VIRAL HEPATITIS B WITHOUT DELTA AGENT AND WITHOUT COMA (H): ICD-10-CM

## 2022-11-10 DIAGNOSIS — F43.10 PTSD (POST-TRAUMATIC STRESS DISORDER): ICD-10-CM

## 2022-11-10 DIAGNOSIS — G47.00 INSOMNIA, UNSPECIFIED TYPE: Primary | ICD-10-CM

## 2022-11-10 PROCEDURE — 99214 OFFICE O/P EST MOD 30 MIN: CPT | Performed by: FAMILY MEDICINE

## 2022-11-10 PROCEDURE — 91313 COVID-19,PF,MODERNA BIVALENT: CPT | Performed by: FAMILY MEDICINE

## 2022-11-10 PROCEDURE — 0134A COVID-19,PF,MODERNA BIVALENT: CPT | Performed by: FAMILY MEDICINE

## 2022-11-10 RX ORDER — HYDROXYZINE HYDROCHLORIDE 25 MG/1
25 TABLET, FILM COATED ORAL 3 TIMES DAILY PRN
Qty: 60 TABLET | Refills: 3 | Status: SHIPPED | OUTPATIENT
Start: 2022-11-10 | End: 2024-01-17

## 2022-11-10 RX ORDER — TRAZODONE HYDROCHLORIDE 100 MG/1
100 TABLET ORAL AT BEDTIME
Qty: 30 TABLET | Refills: 3 | Status: SHIPPED | OUTPATIENT
Start: 2022-11-10 | End: 2022-12-12

## 2022-11-10 RX ORDER — CITALOPRAM HYDROBROMIDE 20 MG/1
20 TABLET ORAL EVERY MORNING
Qty: 30 TABLET | Refills: 3 | Status: SHIPPED | OUTPATIENT
Start: 2022-11-10 | End: 2024-01-17

## 2022-11-10 ASSESSMENT — PATIENT HEALTH QUESTIONNAIRE - PHQ9
10. IF YOU CHECKED OFF ANY PROBLEMS, HOW DIFFICULT HAVE THESE PROBLEMS MADE IT FOR YOU TO DO YOUR WORK, TAKE CARE OF THINGS AT HOME, OR GET ALONG WITH OTHER PEOPLE: VERY DIFFICULT
SUM OF ALL RESPONSES TO PHQ QUESTIONS 1-9: 17
SUM OF ALL RESPONSES TO PHQ QUESTIONS 1-9: 17

## 2022-11-10 NOTE — PROGRESS NOTES
Assessment & Plan     Insomnia, unspecified type  Ok to increase trazodone, she does not think the 50mg was helping.   - traZODone (DESYREL) 100 MG tablet  Dispense: 30 tablet; Refill: 3    Severe episode of recurrent major depressive disorder, with psychotic features (H)  PTSD (post-traumatic stress disorder)  She stopped celexa because she did not think it was helping. phq9 and previous notes show otherwise. No currently suicidal ideations, has fleeting thoughts but never a plan. She refuses to go back to therapy or see a psychiatrist. Right now I do not think she is a danger to herself or others. Will follow closely.   - citalopram (CELEXA) 20 MG tablet  Dispense: 30 tablet; Refill: 3        Chronic viral hepatitis B without delta agent and without coma (H)  Normal lfts, labs done last visit. Will recheck labs in 3-6 months.     Pruritic disorder  She stopped her cetirizine because it was not helping. Unsure why she is itchy, possibly dry skin. Will trial hydroxyzine prn. No visible rashes.   - hydrOXYzine (ATARAX) 25 MG tablet  Dispense: 60 tablet; Refill: 3    High priority for 2019 novel coronavirus vaccination  - COVID-19,PF,MODERNA BIVALENT (18+YRS)          Return in about 1 month (around 12/10/2022) for Medication Check.    30 minutes spent on the date of the encounter doing chart review, history and exam, documentation and further activities per the note    Visit was completed along with Yareli phone     Options for treatment and follow-up care were reviewed with the patient. Silvino Collado Say and/or guardian was engaged and actively involved in the decision making process. Silvino Heaven Say and/or guardian verbalized understanding of the options discussed and was satisfied with the final plan.    John Perales MD  [facility]    Don Dubois is a 42 year old, presenting for the following health issues:  follow up  (Mental health )      History of Present Illness       Reason for  visit:  Follow up mental health    She eats 0-1 servings of fruits and vegetables daily.She consumes 0 sweetened beverage(s) daily.She exercises with enough effort to increase her heart rate 9 or less minutes per day.  She exercises with enough effort to increase her heart rate 3 or less days per week.   She is taking medications regularly.    Today's PHQ-9         PHQ-9 Total Score: 17    PHQ-9 Q9 Thoughts of better off dead/self-harm past 2 weeks :   Several days  Thoughts of suicide or self harm: (P) No  Self-harm Plan:     Self-harm Action:       Safety concerns for self or others: (P) No    How difficult have these problems made it for you to do your work, take care of things at home, or get along with other people: Very difficult       Last visit:   Patient stopped quetiapine due to side effects. She is seeing psychiatry in 2 weeks, encouraged her to ask about this. She has self discontinued mirtazapine, quetiapine, amitriptyline. Can consider trazodone?      Continue celexa, follow up with psychiatry for other suggestions.   Difficulty with transportation/communication. She was scheduled to see psych on 6/27/22 with ride arranged. CCC is involved, patient did attend therapy session on 6/27. Had follow up scheduled 7/18 and 8/1. Psychiatrist appt on 9/12.     Itching - started cetirizine on 9/29  She has itching when shes on her period.   She says she takes 2 medicines - the itching one and the one for her mood.   She also has headaches and dizziness before her period.     PHQ 3/31/2022 8/30/2022 11/10/2022   PHQ-9 Total Score 15 10 17   Q9: Thoughts of better off dead/self-harm past 2 weeks Nearly every day Not at all Several days   F/U: Thoughts of suicide or self-harm - - No   F/U: Safety concerns - - No     Denies any plan for suicide but she thinks about it often. She does not want to be on her citalopram - she stopped it completely over a month ago.       Review of Systems   Constitutional, HEENT,  "cardiovascular, pulmonary, gi and gu systems are negative, except as otherwise noted.      Objective    BP 90/64 (BP Location: Left arm, Patient Position: Sitting, Cuff Size: Adult Regular)   Pulse 64   Temp 97.8  F (36.6  C) (Oral)   Resp 12   Ht 1.546 m (5' 0.85\")   Wt 54.7 kg (120 lb 8 oz)   LMP 10/30/2022 (Approximate)   SpO2 98%   BMI 22.88 kg/m    Body mass index is 22.88 kg/m .  Physical Exam   GENERAL: healthy, alert and no distress  RESP: lungs clear to auscultation - no rales, rhonchi or wheezes  CV: regular rate and rhythm, normal S1 S2, no S3 or S4, no murmur, click or rub, no peripheral edema and peripheral pulses strong  ABDOMEN: soft, nontender, no hepatosplenomegaly, no masses and bowel sounds normal  MS: no gross musculoskeletal defects noted, no edema  SKIN: no suspicious lesions or rashes  NEURO: Normal strength and tone, mentation intact and speech normal  PSYCH: mentation appears normal, affect normal/bright    No results found for this or any previous visit (from the past 24 hour(s)).                "

## 2022-12-12 ENCOUNTER — OFFICE VISIT (OUTPATIENT)
Dept: FAMILY MEDICINE | Facility: CLINIC | Age: 42
End: 2022-12-12
Payer: COMMERCIAL

## 2022-12-12 VITALS
TEMPERATURE: 98.8 F | DIASTOLIC BLOOD PRESSURE: 62 MMHG | HEIGHT: 61 IN | RESPIRATION RATE: 12 BRPM | OXYGEN SATURATION: 100 % | SYSTOLIC BLOOD PRESSURE: 96 MMHG | WEIGHT: 120 LBS | HEART RATE: 76 BPM | BODY MASS INDEX: 22.66 KG/M2

## 2022-12-12 DIAGNOSIS — R63.0 POOR APPETITE: ICD-10-CM

## 2022-12-12 DIAGNOSIS — F43.10 PTSD (POST-TRAUMATIC STRESS DISORDER): ICD-10-CM

## 2022-12-12 DIAGNOSIS — Z00.00 ROUTINE GENERAL MEDICAL EXAMINATION AT A HEALTH CARE FACILITY: Primary | ICD-10-CM

## 2022-12-12 DIAGNOSIS — G47.00 INSOMNIA, UNSPECIFIED TYPE: ICD-10-CM

## 2022-12-12 DIAGNOSIS — F33.3 SEVERE EPISODE OF RECURRENT MAJOR DEPRESSIVE DISORDER, WITH PSYCHOTIC FEATURES (H): ICD-10-CM

## 2022-12-12 DIAGNOSIS — H04.123 DRY EYES: ICD-10-CM

## 2022-12-12 PROCEDURE — 99396 PREV VISIT EST AGE 40-64: CPT | Performed by: FAMILY MEDICINE

## 2022-12-12 RX ORDER — NORTRIPTYLINE HCL 10 MG
10 CAPSULE ORAL
Qty: 90 CAPSULE | Refills: 1 | Status: SHIPPED | OUTPATIENT
Start: 2022-12-12 | End: 2022-12-12

## 2022-12-12 RX ORDER — MIRTAZAPINE 7.5 MG/1
7.5 TABLET, FILM COATED ORAL AT BEDTIME
Qty: 90 TABLET | Refills: 1 | Status: SHIPPED | OUTPATIENT
Start: 2022-12-12 | End: 2023-03-08

## 2022-12-12 RX ORDER — POLYVINYL ALCOHOL, POVIDONE .5; .6 G/100ML; G/100ML
2 LIQUID OPHTHALMIC PRN
Qty: 30 ML | Refills: 3 | Status: SHIPPED | OUTPATIENT
Start: 2022-12-12 | End: 2024-01-17

## 2022-12-12 RX ORDER — NORTRIPTYLINE HCL 10 MG
10 CAPSULE ORAL AT BEDTIME
Qty: 90 CAPSULE | Refills: 1 | Status: SHIPPED | OUTPATIENT
Start: 2022-12-12 | End: 2022-12-12

## 2022-12-12 RX ORDER — MIRTAZAPINE 7.5 MG/1
7.5 TABLET, FILM COATED ORAL AT BEDTIME
Qty: 90 TABLET | Refills: 1 | Status: SHIPPED | OUTPATIENT
Start: 2022-12-12 | End: 2022-12-12

## 2022-12-12 ASSESSMENT — ENCOUNTER SYMPTOMS
HEARTBURN: 0
SHORTNESS OF BREATH: 1
FEVER: 0
ARTHRALGIAS: 1
EYE PAIN: 0
WEAKNESS: 1
FREQUENCY: 1
BREAST MASS: 0
DYSURIA: 0
PARESTHESIAS: 0
HEMATOCHEZIA: 0
HEADACHES: 0
ABDOMINAL PAIN: 0
DIARRHEA: 0
JOINT SWELLING: 1
SORE THROAT: 0
NAUSEA: 1
PALPITATIONS: 0
MYALGIAS: 1
CHILLS: 0
CONSTIPATION: 1
NERVOUS/ANXIOUS: 1
DIZZINESS: 0
COUGH: 1
HEMATURIA: 0

## 2022-12-12 ASSESSMENT — PATIENT HEALTH QUESTIONNAIRE - PHQ9
SUM OF ALL RESPONSES TO PHQ QUESTIONS 1-9: 8
10. IF YOU CHECKED OFF ANY PROBLEMS, HOW DIFFICULT HAVE THESE PROBLEMS MADE IT FOR YOU TO DO YOUR WORK, TAKE CARE OF THINGS AT HOME, OR GET ALONG WITH OTHER PEOPLE: NOT DIFFICULT AT ALL
SUM OF ALL RESPONSES TO PHQ QUESTIONS 1-9: 8

## 2022-12-12 NOTE — PROGRESS NOTES
SUBJECTIVE:   CC: Silvino is an 42 year old who presents for preventive health visit.   Patient has been advised of split billing requirements and indicates understanding: Yes  Healthy Habits:     Getting at least 3 servings of Calcium per day:  Yes    Bi-annual eye exam:  Yes    Dental care twice a year:  NO    Sleep apnea or symptoms of sleep apnea:  None    Diet:  Regular (no restrictions)    Frequency of exercise:  None    Taking medications regularly:  Yes    Medication side effects:  None    PHQ-2 Total Score: 2    Additional concerns today:  Yes    Sleep is poor - she does not sleep well.   She does take the medicine for sleep.     Today's PHQ-2 Score:   PHQ-2 ( 1999 Pfizer) 12/12/2022   Q1: Little interest or pleasure in doing things 1   Q2: Feeling down, depressed or hopeless 1   PHQ-2 Score 2   PHQ-2 Total Score (12-17 Years)- Positive if 3 or more points; Administer PHQ-A if positive -   Q1: Little interest or pleasure in doing things Several days   Q2: Feeling down, depressed or hopeless Several days   PHQ-2 Score 2           Social History     Tobacco Use     Smoking status: Never     Smokeless tobacco: Current     Tobacco comments:     Tobacco with betel nut   Substance Use Topics     Alcohol use: No     If you drink alcohol do you typically have >3 drinks per day or >7 drinks per week? No    Alcohol Use 12/12/2022   Prescreen: >3 drinks/day or >7 drinks/week? No   Prescreen: >3 drinks/day or >7 drinks/week? -   No flowsheet data found.    Reviewed orders with patient.  Reviewed health maintenance and updated orders accordingly - Yes  Labs reviewed in EPIC    Breast Cancer Screening:  Any new diagnosis of family breast, ovarian, or bowel cancer? No    FHS-7: No flowsheet data found.      Pertinent mammograms are reviewed under the imaging tab.    History of abnormal Pap smear: NO - age 30- 65 PAP every 3 years recommended  PAP / HPV Latest Ref Rng & Units 9/1/2020   PAP Negative for squamous  "intraepithelial lesion or malignancy. Negative for squamous intraepithelial lesion or malignancy  Electronically signed by Marilyn Cassidy CT (ASCP) on 9/11/2020 at  1:46 PM     HPV16 NEG Negative   HPV18 NEG Negative   HRHPV NEG Negative     Reviewed and updated as needed this visit by clinical staff   Tobacco  Allergies  Meds  Problems  Med Hx  Surg Hx  Fam Hx          Reviewed and updated as needed this visit by Provider   Tobacco  Allergies  Meds  Problems  Med Hx  Surg Hx  Fam Hx             Review of Systems   Constitutional: Negative for chills and fever.   HENT: Positive for congestion. Negative for ear pain, hearing loss and sore throat.    Eyes: Positive for visual disturbance. Negative for pain.   Respiratory: Positive for cough and shortness of breath.    Cardiovascular: Negative for chest pain, palpitations and peripheral edema.   Gastrointestinal: Positive for constipation and nausea. Negative for abdominal pain, diarrhea, heartburn and hematochezia.   Breasts:  Negative for tenderness, breast mass and discharge.   Genitourinary: Positive for frequency, urgency and vaginal discharge. Negative for dysuria, genital sores, hematuria, pelvic pain and vaginal bleeding.   Musculoskeletal: Positive for arthralgias, joint swelling and myalgias.   Skin: Positive for rash.   Neurological: Positive for weakness. Negative for dizziness, headaches and paresthesias.   Psychiatric/Behavioral: Positive for mood changes. The patient is nervous/anxious.           OBJECTIVE:   BP 96/62 (BP Location: Right arm, Patient Position: Sitting, Cuff Size: Adult Regular)   Pulse 76   Temp 98.8  F (37.1  C) (Oral)   Resp 12   Ht 1.549 m (5' 1\")   Wt 54.4 kg (120 lb)   LMP 11/23/2022 (Exact Date)   SpO2 100%   BMI 22.67 kg/m    Physical Exam  GENERAL: healthy, alert and no distress  EYES: Eyes grossly normal to inspection, PERRL and conjunctivae and sclerae normal  HENT: ear canals and TM's normal, nose and " mouth without ulcers or lesions  NECK: no adenopathy, no asymmetry, masses, or scars and thyroid normal to palpation  RESP: lungs clear to auscultation - no rales, rhonchi or wheezes  CV: regular rate and rhythm, normal S1 S2, no S3 or S4, no murmur, click or rub, no peripheral edema and peripheral pulses strong  ABDOMEN: soft, nontender, no hepatosplenomegaly, no masses and bowel sounds normal  MS: no gross musculoskeletal defects noted, no edema  SKIN: no suspicious lesions or rashes  NEURO: Normal strength and tone, mentation intact and speech normal  PSYCH: mentation appears normal, affect normal/bright    Diagnostic Test Results:  Labs reviewed in Epic    ASSESSMENT/PLAN:   Ten was seen today for physical.    Diagnoses and all orders for this visit:    Routine general medical examination at a health care facility    Severe episode of recurrent major depressive disorder, with psychotic features (H)  PTSD (post-traumatic stress disorder)  ROS reviewed with patient and she is very inconsistent with her symptom reporting.   Denies any suicidal ideations, she is unsure if medicine is helping. phq score 8, previously 17.   - continue citalopram  - discontinue trazodone (she doesn't think it helps but takes it almost every day)  - start mirtazapine and titrate up for appetite    Insomnia, unspecified type  Patient has tried mirtazapine before but was unclear why she stopped - maybe due to drowsiness. Take at night, increase as tolerated. Goal is to use it for appetite and not sleep, so titrating to higher dose is desirable.   -     mirtazapine (REMERON) 7.5 MG tablet; Take 1 tablet (7.5 mg) by mouth At Bedtime    Dry eyes  -     Polyvinyl Alcohol-Povidone (ARTIFICIAL TEARS) 5-6 MG/ML SOLN; Apply 2 drops to eye as needed (dry eyes)    Poor appetite  -     mirtazapine (REMERON) 7.5 MG tablet; Take 1 tablet (7.5 mg) by mouth At Bedtime        Mail and transportation keeps going to her old address.   She has her new  address in her chart, new address is the one on [facility]. The other one was on [place].       COUNSELING:  Reviewed preventive health counseling, as reflected in patient instructions        She reports that she has never smoked. She uses smokeless tobacco.      John Perales MD  [facility]

## 2023-03-08 ENCOUNTER — OFFICE VISIT (OUTPATIENT)
Dept: FAMILY MEDICINE | Facility: CLINIC | Age: 43
End: 2023-03-08
Payer: COMMERCIAL

## 2023-03-08 VITALS
HEART RATE: 64 BPM | OXYGEN SATURATION: 100 % | DIASTOLIC BLOOD PRESSURE: 60 MMHG | RESPIRATION RATE: 16 BRPM | SYSTOLIC BLOOD PRESSURE: 110 MMHG | TEMPERATURE: 98.5 F

## 2023-03-08 DIAGNOSIS — G47.00 INSOMNIA, UNSPECIFIED TYPE: ICD-10-CM

## 2023-03-08 DIAGNOSIS — R51.9 NONINTRACTABLE EPISODIC HEADACHE, UNSPECIFIED HEADACHE TYPE: Primary | ICD-10-CM

## 2023-03-08 DIAGNOSIS — R63.0 POOR APPETITE: ICD-10-CM

## 2023-03-08 PROCEDURE — 99214 OFFICE O/P EST MOD 30 MIN: CPT | Performed by: FAMILY MEDICINE

## 2023-03-08 RX ORDER — MIRTAZAPINE 7.5 MG/1
7.5 TABLET, FILM COATED ORAL AT BEDTIME
Qty: 30 TABLET | Refills: 11 | Status: SHIPPED | OUTPATIENT
Start: 2023-03-08 | End: 2024-01-17

## 2023-03-08 RX ORDER — IBUPROFEN 600 MG/1
600 TABLET, FILM COATED ORAL EVERY 8 HOURS PRN
Qty: 50 TABLET | Refills: 3 | Status: SHIPPED | OUTPATIENT
Start: 2023-03-08 | End: 2024-01-17

## 2023-03-08 ASSESSMENT — PATIENT HEALTH QUESTIONNAIRE - PHQ9
SUM OF ALL RESPONSES TO PHQ QUESTIONS 1-9: 4
10. IF YOU CHECKED OFF ANY PROBLEMS, HOW DIFFICULT HAVE THESE PROBLEMS MADE IT FOR YOU TO DO YOUR WORK, TAKE CARE OF THINGS AT HOME, OR GET ALONG WITH OTHER PEOPLE: SOMEWHAT DIFFICULT
SUM OF ALL RESPONSES TO PHQ QUESTIONS 1-9: 4

## 2023-03-08 ASSESSMENT — ENCOUNTER SYMPTOMS: HEADACHES: 1

## 2023-03-08 NOTE — PROGRESS NOTES
Assessment & Plan     Nonintractable episodic headache, unspecified headache type    Seems associated with lack of sleep at times.    - ibuprofen (ADVIL/MOTRIN) 600 MG tablet  Dispense: 50 tablet; Refill: 3    Insomnia, unspecified type    - mirtazapine (REMERON) 7.5 MG tablet  Dispense: 30 tablet; Refill: 11    Poor appetite    - mirtazapine (REMERON) 7.5 MG tablet  Dispense: 30 tablet; Refill: 11    I called her pharmacy to explain that she will need a 2-month supply of medications for her trip.    34 minutes spent on the date of the encounter doing chart review and patient visit regarding headache, insomnia.           Rian Dodson MD  [facility] is a 43 year old, presenting for the following health issues:  Headache, Derm Problem, and SORE ON LIPS      Headache          Right temple headache.  Began 2 months ago.  A few times per month, and before period, if poor sleep.  Tylenol sometimes helps.  She works nights, sleeps 7 am - 1 pm.    She will travel to [facility] for 2 months on 3/20/23.    Current Outpatient Medications   Medication Sig Dispense Refill     citalopram (CELEXA) 20 MG tablet Take 1 tablet (20 mg) by mouth every morning 30 tablet 3     diclofenac (VOLTAREN) 1 % topical gel Apply 2 g topically 4 times daily as needed for moderate pain (right ankle pain) 350 g 3     hydrOXYzine (ATARAX) 25 MG tablet Take 1 tablet (25 mg) by mouth 3 times daily as needed for itching 60 tablet 3     ibuprofen (ADVIL/MOTRIN) 600 MG tablet Take 1 tablet (600 mg) by mouth every 8 hours as needed for moderate pain (4-6) 50 tablet 3     mirtazapine (REMERON) 7.5 MG tablet Take 1 tablet (7.5 mg) by mouth At Bedtime 30 tablet 11     Polyvinyl Alcohol-Povidone (ARTIFICIAL TEARS) 5-6 MG/ML SOLN Apply 2 drops to eye as needed (dry eyes) 30 mL 3         Review of Systems   Neurological: Positive for headaches.            Objective    /60   Pulse 64   Temp 98.5  F (36.9  C)    Resp 16   LMP  (LMP Unknown)   SpO2 100%   There is no height or weight on file to calculate BMI.  Physical Exam   Heart normal  Lungs normal  Eyes normal  Ears normal  Neck normal

## 2023-06-30 ENCOUNTER — NURSE TRIAGE (OUTPATIENT)
Dept: NURSING | Facility: CLINIC | Age: 43
End: 2023-06-30
Payer: COMMERCIAL

## 2023-06-30 ENCOUNTER — OFFICE VISIT (OUTPATIENT)
Dept: FAMILY MEDICINE | Facility: CLINIC | Age: 43
End: 2023-06-30
Payer: COMMERCIAL

## 2023-06-30 VITALS
HEART RATE: 69 BPM | SYSTOLIC BLOOD PRESSURE: 125 MMHG | TEMPERATURE: 97.9 F | DIASTOLIC BLOOD PRESSURE: 81 MMHG | RESPIRATION RATE: 16 BRPM | OXYGEN SATURATION: 100 %

## 2023-06-30 DIAGNOSIS — R39.9 UTI SYMPTOMS: Primary | ICD-10-CM

## 2023-06-30 LAB
ALBUMIN UR-MCNC: NEGATIVE MG/DL
APPEARANCE UR: CLEAR
BACTERIA #/AREA URNS HPF: ABNORMAL /HPF
BILIRUB UR QL STRIP: NEGATIVE
CLUE CELLS: ABNORMAL
COLOR UR AUTO: YELLOW
GLUCOSE UR STRIP-MCNC: NEGATIVE MG/DL
HGB UR QL STRIP: ABNORMAL
KETONES UR STRIP-MCNC: NEGATIVE MG/DL
LEUKOCYTE ESTERASE UR QL STRIP: ABNORMAL
NITRATE UR QL: NEGATIVE
PH UR STRIP: 6 [PH] (ref 5–8)
RBC #/AREA URNS AUTO: ABNORMAL /HPF
SP GR UR STRIP: 1.01 (ref 1–1.03)
SQUAMOUS #/AREA URNS AUTO: ABNORMAL /LPF
TRICHOMONAS, WET PREP: ABNORMAL
UROBILINOGEN UR STRIP-ACNC: 0.2 E.U./DL
WBC #/AREA URNS AUTO: ABNORMAL /HPF
WBC'S/HIGH POWER FIELD, WET PREP: ABNORMAL
YEAST, WET PREP: ABNORMAL

## 2023-06-30 PROCEDURE — 99214 OFFICE O/P EST MOD 30 MIN: CPT | Performed by: PHYSICIAN ASSISTANT

## 2023-06-30 PROCEDURE — 87088 URINE BACTERIA CULTURE: CPT | Performed by: PHYSICIAN ASSISTANT

## 2023-06-30 PROCEDURE — 81001 URINALYSIS AUTO W/SCOPE: CPT

## 2023-06-30 PROCEDURE — 87210 SMEAR WET MOUNT SALINE/INK: CPT

## 2023-06-30 PROCEDURE — 87086 URINE CULTURE/COLONY COUNT: CPT | Performed by: PHYSICIAN ASSISTANT

## 2023-06-30 RX ORDER — NITROFURANTOIN 25; 75 MG/1; MG/1
100 CAPSULE ORAL 2 TIMES DAILY
Qty: 10 CAPSULE | Refills: 0 | Status: SHIPPED | OUTPATIENT
Start: 2023-06-30 | End: 2023-07-05

## 2023-06-30 NOTE — PROGRESS NOTES
Assessment & Plan     UTI symptoms  Pt has irritative voiding sx.    Her UA is not suggestive of uti but her sx are.    Will culture urine.    Offered tx awaiting culture and pt would like this.    Her wet prep was unremarkable.    She denies concern for STI.    Will be contacted if change of abx needed, but if no growth and sx persist should be rechecked    Pt agreeable with plan.      - Wet prep - Clinic Collect  - UA Macroscopic with reflex to Microscopic and Culture  - UA Microscopic with Reflex to Culture  - Urine Culture Aerobic Bacterial - lab collect  - nitroFURantoin macrocrystal-monohydrate (MACROBID) 100 MG capsule  Dispense: 10 capsule; Refill: 0  - Urine Culture Aerobic Bacterial - lab collect       Patty Sampson PA-C  [facility]     Silvino is a 43 year old female who presents to clinic today for the following health issues:  Chief Complaint   Patient presents with     Urinary Problem     Pain right before the last drop when urinating. Burning sensation. X 2 days. Itchy.      HPI    2 day hx of dysuria, frequency, urgency.    No fevers, chills, nausea, vomiting or abdomen pain.    No back pain. No discharge.  No vaginal irritation, itching, or change of discharge.  .   No hx of uti.    No concern for STI.        Review of Systems  Constitutional, HEENT, cardiovascular, pulmonary, gi and gu systems are negative, except as otherwise noted.      Patient Active Problem List   Diagnosis     Insomnia, unspecified type     Chronic viral hepatitis B without delta agent and without coma (H)     Hyperlipidemia     Vitamin D deficiency     Severe episode of recurrent major depressive disorder, with psychotic features (H)     PTSD (post-traumatic stress disorder)     Persistent depressive disorder with anxious distress, currently moderate     Chronic pain of both shoulders     Gastroesophageal reflux disease without esophagitis     Itching     Social isolation     Current  Outpatient Medications   Medication     nitroFURantoin macrocrystal-monohydrate (MACROBID) 100 MG capsule     citalopram (CELEXA) 20 MG tablet     diclofenac (VOLTAREN) 1 % topical gel     hydrOXYzine (ATARAX) 25 MG tablet     ibuprofen (ADVIL/MOTRIN) 600 MG tablet     mirtazapine (REMERON) 7.5 MG tablet     Polyvinyl Alcohol-Povidone (ARTIFICIAL TEARS) 5-6 MG/ML SOLN     No current facility-administered medications for this visit.       Objective    /81 (BP Location: Right arm, Patient Position: Sitting, Cuff Size: Adult Regular)   Pulse 69   Temp 97.9  F (36.6  C) (Oral)   Resp 16   SpO2 100%   Physical Exam   Patient is in no acute distress and appears well.  No costovertebral angle tenderness is present.  Abdomen is soft nontender to light or deep palpation no masses or hepatosplenomegaly present.  Results for orders placed or performed in visit on 06/30/23   UA Macroscopic with reflex to Microscopic and Culture     Status: Abnormal    Specimen: Urine, Clean Catch   Result Value Ref Range    Color Urine Yellow Colorless, Straw, Light Yellow, Yellow    Appearance Urine Clear Clear    Glucose Urine Negative Negative mg/dL    Bilirubin Urine Negative Negative    Ketones Urine Negative Negative mg/dL    Specific Gravity Urine 1.010 1.005 - 1.030    Blood Urine Small (A) Negative    pH Urine 6.0 5.0 - 8.0    Protein Albumin Urine Negative Negative mg/dL    Urobilinogen Urine 0.2 0.2, 1.0 E.U./dL    Nitrite Urine Negative Negative    Leukocyte Esterase Urine Trace (A) Negative   UA Microscopic with Reflex to Culture     Status: Abnormal   Result Value Ref Range    Bacteria Urine Few (A) None Seen /HPF    RBC Urine 0-2 0-2 /HPF /HPF    WBC Urine 0-5 0-5 /HPF /HPF    Squamous Epithelials Urine Few (A) None Seen /LPF    Narrative    Urine Culture not indicated   Wet prep - Clinic Collect     Status: Abnormal    Specimen: Vagina; Swab   Result Value Ref Range    Trichomonas Absent Absent    Yeast Absent Absent     Clue Cells Absent Absent    WBCs/high power field 1+ (A) None

## 2023-06-30 NOTE — TELEPHONE ENCOUNTER
"Second Level Triage    Situation: UTI symptoms for 2 days    Assessment:   Pt is reporting pain with urination, burning pain. Pt rates pain \"7\". Pt is able to urinate, and is drinking plenty of fluids. Pt denies any fever or vomiting, or any flank pain, blood in her urine, or foul smelling urine.      Protocol Recommended Disposition:  See in Office Today.      Recommendation:   Per protocol, pt should be evaluated today in the clinic. Pt was advised if she is unable to get an appointment in the clinic for today, she will need to go to the Urgent Care. Pt was advised to call back if symptoms worsen.    Pt verbalized understanding.    Javy Peralta RN on 6/30/2023 at 2:35 PM      Reason for Disposition    Urinating more frequently than usual (i.e., frequency)    Additional Information    Negative: Shock suspected (e.g., cold/pale/clammy skin, too weak to stand, low BP, rapid pulse)    Negative: Sounds like a life-threatening emergency to the triager    Negative: Followed a female genital area injury (e.g., vagina, vulva)    Negative: Followed a male genital area injury (penis, scrotum)    Negative: Vaginal discharge    Negative: Pus (white, yellow) or bloody discharge from end of penis    Negative: Pain or burning with passing urine (urination) and pregnant    Negative: Pain or burning with passing urine (urination) and female    Negative: Pain or burning with passing urine (urination) and male    Negative: Pain or itching in the vulvar area    Negative: Pain in scrotum is main symptom    Negative: Blood in the urine is main symptom    Negative: Symptoms arising from use of a urinary catheter (e.g., coude, Naik)    Negative: Unable to urinate (or only a few drops) > 4 hours and bladder feels very full (e.g., palpable bladder or strong urge to urinate)    Negative: Decreased urination and drinking very little and dehydration suspected (e.g., dark urine, no urine > 12 hours, very dry mouth, very lightheaded)    " Negative: Patient sounds very sick or weak to the triager    Negative: Fever > 100.4 F  (38.0 C)    Negative: Side (flank) or lower back pain present    Negative: Can't control passage of urine (i.e., urinary incontinence) and new-onset (< 2 weeks) or worsening    Protocols used: URINARY SYMPTOMS-A-OH

## 2023-07-02 ENCOUNTER — TELEPHONE (OUTPATIENT)
Dept: FAMILY MEDICINE | Facility: CLINIC | Age: 43
End: 2023-07-02
Payer: COMMERCIAL

## 2023-07-02 DIAGNOSIS — N39.0 GROUP B STREPTOCOCCAL UTI: Primary | ICD-10-CM

## 2023-07-02 DIAGNOSIS — B95.1 GROUP B STREPTOCOCCAL UTI: Primary | ICD-10-CM

## 2023-07-02 LAB
BACTERIA UR CULT: ABNORMAL
BACTERIA UR CULT: ABNORMAL

## 2023-07-02 RX ORDER — AMOXICILLIN 500 MG/1
500 CAPSULE ORAL 2 TIMES DAILY
Qty: 20 CAPSULE | Refills: 0 | Status: SHIPPED | OUTPATIENT
Start: 2023-07-02 | End: 2023-07-12

## 2023-07-02 NOTE — TELEPHONE ENCOUNTER
Patient called regarding her positive urine culture results.  See result note for 6/30/2023  urine culture.  Amoxicillin ordered and sent to [facility] as noted to treat urine Group B strep infection, patient was symptomatic at time UA/urine culture completed.      Called patient via  line-phone went to voicemail-the  line left a message that the patient's urine test showed that she had an infection and needed a new antibiotic,   Amoxicillin and that medication was sent to the [facility] for her to .    Malathi Osborne MD      Addendum    Patient is currently on nitrofurantoin.  I sent a message as noted below  to the [facility] walk in care support  pool to call patient     Please call patient via a Yareli  on 7/3/2023 and verify that the patient received the message. In addition she needs to be instructed to stop the nitrofurantoin  which she is taking at present and will not treat the bacteria that was noted on her urine culture.   She needs to  th e amoxicillin  that was sent to the [facility] on 7/2/2023 and take it 2 times a day for 10 days to treat her urine infection.      Thank you,  Malathi Osborne MD

## 2024-01-15 ENCOUNTER — OFFICE VISIT (OUTPATIENT)
Dept: FAMILY MEDICINE | Facility: CLINIC | Age: 44
End: 2024-01-15
Payer: COMMERCIAL

## 2024-01-15 VITALS
HEART RATE: 71 BPM | OXYGEN SATURATION: 99 % | DIASTOLIC BLOOD PRESSURE: 71 MMHG | WEIGHT: 128 LBS | HEIGHT: 61 IN | BODY MASS INDEX: 24.17 KG/M2 | RESPIRATION RATE: 16 BRPM | TEMPERATURE: 98.3 F | SYSTOLIC BLOOD PRESSURE: 108 MMHG

## 2024-01-15 DIAGNOSIS — N89.8 VAGINAL DISCHARGE: Primary | ICD-10-CM

## 2024-01-15 DIAGNOSIS — B18.1 HEPATITIS B CARRIER (H): ICD-10-CM

## 2024-01-15 DIAGNOSIS — K59.00 CONSTIPATION, UNSPECIFIED CONSTIPATION TYPE: ICD-10-CM

## 2024-01-15 DIAGNOSIS — R35.0 FREQUENT URINATION: ICD-10-CM

## 2024-01-15 DIAGNOSIS — R11.0 NAUSEA: ICD-10-CM

## 2024-01-15 LAB
ALBUMIN UR-MCNC: NEGATIVE MG/DL
APPEARANCE UR: CLEAR
BACTERIA #/AREA URNS HPF: ABNORMAL /HPF
BASOPHILS # BLD AUTO: 0 10E3/UL (ref 0–0.2)
BASOPHILS NFR BLD AUTO: 0 %
BILIRUB UR QL STRIP: NEGATIVE
CLUE CELLS: ABNORMAL
COLOR UR AUTO: YELLOW
EOSINOPHIL # BLD AUTO: 0.2 10E3/UL (ref 0–0.7)
EOSINOPHIL NFR BLD AUTO: 2 %
ERYTHROCYTE [DISTWIDTH] IN BLOOD BY AUTOMATED COUNT: 13 % (ref 10–15)
GLUCOSE UR STRIP-MCNC: NEGATIVE MG/DL
HCG UR QL: NEGATIVE
HCT VFR BLD AUTO: 38.1 % (ref 35–47)
HGB BLD-MCNC: 12.6 G/DL (ref 11.7–15.7)
HGB UR QL STRIP: NEGATIVE
IMM GRANULOCYTES # BLD: 0 10E3/UL
IMM GRANULOCYTES NFR BLD: 0 %
KETONES UR STRIP-MCNC: ABNORMAL MG/DL
LEUKOCYTE ESTERASE UR QL STRIP: ABNORMAL
LYMPHOCYTES # BLD AUTO: 2.1 10E3/UL (ref 0.8–5.3)
LYMPHOCYTES NFR BLD AUTO: 31 %
MCH RBC QN AUTO: 31 PG (ref 26.5–33)
MCHC RBC AUTO-ENTMCNC: 33.1 G/DL (ref 31.5–36.5)
MCV RBC AUTO: 94 FL (ref 78–100)
MONOCYTES # BLD AUTO: 0.6 10E3/UL (ref 0–1.3)
MONOCYTES NFR BLD AUTO: 9 %
MUCOUS THREADS #/AREA URNS LPF: PRESENT /LPF
NEUTROPHILS # BLD AUTO: 3.9 10E3/UL (ref 1.6–8.3)
NEUTROPHILS NFR BLD AUTO: 57 %
NITRATE UR QL: NEGATIVE
PH UR STRIP: 5.5 [PH] (ref 5–7)
PLATELET # BLD AUTO: 176 10E3/UL (ref 150–450)
RBC # BLD AUTO: 4.06 10E6/UL (ref 3.8–5.2)
RBC #/AREA URNS AUTO: ABNORMAL /HPF
SP GR UR STRIP: 1.02 (ref 1–1.03)
SQUAMOUS #/AREA URNS AUTO: ABNORMAL /LPF
TRICHOMONAS, WET PREP: ABNORMAL
UROBILINOGEN UR STRIP-ACNC: 0.2 E.U./DL
WBC # BLD AUTO: 6.8 10E3/UL (ref 4–11)
WBC #/AREA URNS AUTO: ABNORMAL /HPF
WBC'S/HIGH POWER FIELD, WET PREP: ABNORMAL
YEAST, WET PREP: ABNORMAL

## 2024-01-15 PROCEDURE — 81001 URINALYSIS AUTO W/SCOPE: CPT | Performed by: FAMILY MEDICINE

## 2024-01-15 PROCEDURE — 82105 ALPHA-FETOPROTEIN SERUM: CPT | Mod: 90 | Performed by: FAMILY MEDICINE

## 2024-01-15 PROCEDURE — 91320 SARSCV2 VAC 30MCG TRS-SUC IM: CPT | Performed by: FAMILY MEDICINE

## 2024-01-15 PROCEDURE — 87350 HEPATITIS BE AG IA: CPT | Mod: 90 | Performed by: FAMILY MEDICINE

## 2024-01-15 PROCEDURE — 81025 URINE PREGNANCY TEST: CPT | Performed by: FAMILY MEDICINE

## 2024-01-15 PROCEDURE — 90480 ADMN SARSCOV2 VAC 1/ONLY CMP: CPT | Performed by: FAMILY MEDICINE

## 2024-01-15 PROCEDURE — 99000 SPECIMEN HANDLING OFFICE-LAB: CPT | Performed by: FAMILY MEDICINE

## 2024-01-15 PROCEDURE — 90686 IIV4 VACC NO PRSV 0.5 ML IM: CPT | Performed by: FAMILY MEDICINE

## 2024-01-15 PROCEDURE — 99213 OFFICE O/P EST LOW 20 MIN: CPT | Mod: 25 | Performed by: FAMILY MEDICINE

## 2024-01-15 PROCEDURE — 85025 COMPLETE CBC W/AUTO DIFF WBC: CPT | Performed by: FAMILY MEDICINE

## 2024-01-15 PROCEDURE — 36415 COLL VENOUS BLD VENIPUNCTURE: CPT | Performed by: FAMILY MEDICINE

## 2024-01-15 PROCEDURE — 87210 SMEAR WET MOUNT SALINE/INK: CPT | Performed by: FAMILY MEDICINE

## 2024-01-15 PROCEDURE — 80053 COMPREHEN METABOLIC PANEL: CPT | Performed by: FAMILY MEDICINE

## 2024-01-15 PROCEDURE — 90471 IMMUNIZATION ADMIN: CPT | Performed by: FAMILY MEDICINE

## 2024-01-15 RX ORDER — POLYETHYLENE GLYCOL 3350 17 G/17G
1 POWDER, FOR SOLUTION ORAL DAILY
Qty: 850 G | Refills: 0 | Status: SHIPPED | OUTPATIENT
Start: 2024-01-15 | End: 2024-01-17

## 2024-01-15 ASSESSMENT — PATIENT HEALTH QUESTIONNAIRE - PHQ9
SUM OF ALL RESPONSES TO PHQ QUESTIONS 1-9: 2
SUM OF ALL RESPONSES TO PHQ QUESTIONS 1-9: 2
10. IF YOU CHECKED OFF ANY PROBLEMS, HOW DIFFICULT HAVE THESE PROBLEMS MADE IT FOR YOU TO DO YOUR WORK, TAKE CARE OF THINGS AT HOME, OR GET ALONG WITH OTHER PEOPLE: SOMEWHAT DIFFICULT

## 2024-01-15 NOTE — PROGRESS NOTES
Assessment & Plan     Frequent urination  UA negative for infection, from history this symptom is not new, but UA collected upon rooming  - UA with Microscopic reflex to Culture - Clinic Collect  - UA Microscopic with Reflex to Culture    Vaginal discharge  Wet prep collected at end of visit and results will be called to patient  - Wet prep - Clinic Collect    Nausea  UPT negative. Labs as below. Chronic hep B, no [facility] notes since 2022, not clear if patient has seen them since 2022. Will obtain labs as below. Referral back to [facility] for chronic hep B and new nausea.   - HCG qualitative urine  - Comprehensive metabolic panel (BMP + Alb, Alk Phos, ALT, AST, Total. Bili, TP)  - CBC with platelets and differential  - Helicobacter pylori Antigen Stool  - polyethylene glycol (MIRALAX) 17 GM/Dose powder  Dispense: 850 g; Refill: 0    Addendum 1/16 - attempted to add hep B labs that she is due for, but unable to add Hep Be Ab and HBV quant DNA to specimen. Referring to [facility] as above.     Constipation  Could be a factor in her nausea. Previously miralax was recommended by [facility]. Recommend starting 1 capful daily.   - polyethylene glycol (MIRALAX) 17 GM/Dose powder  Dispense: 850 g; Refill: 0                   Yessenia Narayanan MD  [facility]   Silvino is a 43 year old, presenting for the following health issues:  Nausea and Vaginal Problem (Frequent urination and vaginal itching)        1/15/2024    12:47 PM   Additional Questions   Roomed by Rogelio MUNOZ     After voiding, vaginal itching. No dysuria. Possible urinary frequency.  Sometimes white vaginal discharge.     Not pregnant or breast feeding.  Menses monthly. Not sexually active.     Nausea x several months. Occasional vomiting (rare - maybe once/month). Constipation x 1-2 months. Bms daily but stool is hard. Poor appetite - does not feel like eating in morning, drinks only warm water. Eats at 1-2pm. Only eating once daily.  "    Chronic hep B - last [facility] visit \"last year\" per patient, last notes from 2022    Cannot name her medications, but takes prn med \"for itching\" (suspect hydroxyzine)    Was taking med for appetite (suspect mirtazapine) but ran out (only filled once per med fill history)    Celexa on med list but old? Last 2022 for 30 tabs 3 refills (no recent fills)                Review of Systems         Objective    /71   Pulse 71   Temp 98.3  F (36.8  C) (Oral)   Resp 16   Ht 1.549 m (5' 1\")   Wt 58.1 kg (128 lb)   LMP 12/14/2023   SpO2 99%   BMI 24.19 kg/m    Body mass index is 24.19 kg/m .  Physical Exam   GENERAL: healthy, alert and no distress  EYES: Eyes grossly normal to inspection, PERRL and conjunctivae and sclerae normal  HENT: mouth without ulcers or lesions  NECK: no adenopathy  RESP: lungs clear to auscultation - no rales, rhonchi or wheezes  CV: regular rate and rhythm, normal S1 S2, no S3 or S4, no murmur, click or rub, no peripheral edema   ABDOMEN: soft, nontender, not distended  MS: no gross musculoskeletal defects noted, no edema  Genitalia: normal external genitalia without swelling, erythema, or lesions. No discharge noted.                       "

## 2024-01-16 LAB
ALBUMIN SERPL BCG-MCNC: 4.1 G/DL (ref 3.5–5.2)
ALP SERPL-CCNC: 43 U/L (ref 40–150)
ALT SERPL W P-5'-P-CCNC: 18 U/L (ref 0–50)
ANION GAP SERPL CALCULATED.3IONS-SCNC: 9 MMOL/L (ref 7–15)
AST SERPL W P-5'-P-CCNC: 28 U/L (ref 0–45)
BILIRUB SERPL-MCNC: 0.2 MG/DL
BUN SERPL-MCNC: 11.1 MG/DL (ref 6–20)
CALCIUM SERPL-MCNC: 9.1 MG/DL (ref 8.6–10)
CHLORIDE SERPL-SCNC: 103 MMOL/L (ref 98–107)
CREAT SERPL-MCNC: 0.81 MG/DL (ref 0.51–0.95)
DEPRECATED HCO3 PLAS-SCNC: 26 MMOL/L (ref 22–29)
EGFRCR SERPLBLD CKD-EPI 2021: >90 ML/MIN/1.73M2
GLUCOSE SERPL-MCNC: 89 MG/DL (ref 70–99)
POTASSIUM SERPL-SCNC: 3.8 MMOL/L (ref 3.4–5.3)
PROT SERPL-MCNC: 7.2 G/DL (ref 6.4–8.3)
SODIUM SERPL-SCNC: 138 MMOL/L (ref 135–145)

## 2024-01-16 PROCEDURE — 87338 HPYLORI STOOL AG IA: CPT | Performed by: FAMILY MEDICINE

## 2024-01-17 ENCOUNTER — TELEPHONE (OUTPATIENT)
Dept: FAMILY MEDICINE | Facility: CLINIC | Age: 44
End: 2024-01-17

## 2024-01-17 ENCOUNTER — PATIENT OUTREACH (OUTPATIENT)
Dept: CARE COORDINATION | Facility: CLINIC | Age: 44
End: 2024-01-17

## 2024-01-17 ENCOUNTER — OFFICE VISIT (OUTPATIENT)
Dept: FAMILY MEDICINE | Facility: CLINIC | Age: 44
End: 2024-01-17
Payer: COMMERCIAL

## 2024-01-17 VITALS
RESPIRATION RATE: 16 BRPM | WEIGHT: 126.25 LBS | OXYGEN SATURATION: 99 % | SYSTOLIC BLOOD PRESSURE: 96 MMHG | TEMPERATURE: 98 F | HEART RATE: 73 BPM | HEIGHT: 61 IN | BODY MASS INDEX: 23.84 KG/M2 | DIASTOLIC BLOOD PRESSURE: 66 MMHG

## 2024-01-17 DIAGNOSIS — Z60.4 SOCIAL ISOLATION: ICD-10-CM

## 2024-01-17 DIAGNOSIS — L29.9 PRURITIC DISORDER: ICD-10-CM

## 2024-01-17 DIAGNOSIS — F43.10 PTSD (POST-TRAUMATIC STRESS DISORDER): ICD-10-CM

## 2024-01-17 DIAGNOSIS — F33.3 SEVERE EPISODE OF RECURRENT MAJOR DEPRESSIVE DISORDER, WITH PSYCHOTIC FEATURES (H): ICD-10-CM

## 2024-01-17 DIAGNOSIS — K59.00 CONSTIPATION, UNSPECIFIED CONSTIPATION TYPE: ICD-10-CM

## 2024-01-17 DIAGNOSIS — Z00.00 ROUTINE GENERAL MEDICAL EXAMINATION AT A HEALTH CARE FACILITY: Primary | ICD-10-CM

## 2024-01-17 DIAGNOSIS — K21.9 GASTROESOPHAGEAL REFLUX DISEASE WITHOUT ESOPHAGITIS: ICD-10-CM

## 2024-01-17 DIAGNOSIS — R35.0 URINARY FREQUENCY: ICD-10-CM

## 2024-01-17 DIAGNOSIS — B18.1 CHRONIC VIRAL HEPATITIS B WITHOUT DELTA AGENT AND WITHOUT COMA (H): ICD-10-CM

## 2024-01-17 DIAGNOSIS — N89.8 VAGINAL ITCHING: ICD-10-CM

## 2024-01-17 DIAGNOSIS — G47.00 INSOMNIA, UNSPECIFIED TYPE: ICD-10-CM

## 2024-01-17 DIAGNOSIS — L30.9 DERMATITIS: ICD-10-CM

## 2024-01-17 PROBLEM — E78.49 OTHER HYPERLIPIDEMIA: Status: ACTIVE | Noted: 2017-03-14

## 2024-01-17 PROBLEM — F34.1 PERSISTENT DEPRESSIVE DISORDER WITH ANXIOUS DISTRESS, CURRENTLY MODERATE: Status: RESOLVED | Noted: 2019-05-17 | Resolved: 2024-01-17

## 2024-01-17 PROCEDURE — 99214 OFFICE O/P EST MOD 30 MIN: CPT | Mod: 25 | Performed by: FAMILY MEDICINE

## 2024-01-17 PROCEDURE — 99396 PREV VISIT EST AGE 40-64: CPT | Performed by: FAMILY MEDICINE

## 2024-01-17 RX ORDER — HYDROXYZINE HYDROCHLORIDE 25 MG/1
25 TABLET, FILM COATED ORAL 3 TIMES DAILY PRN
Qty: 60 TABLET | Refills: 3 | Status: SHIPPED | OUTPATIENT
Start: 2024-01-17 | End: 2024-01-17 | Stop reason: DRUGHIGH

## 2024-01-17 RX ORDER — TRIAMCINOLONE ACETONIDE 1 MG/G
CREAM TOPICAL 2 TIMES DAILY
Qty: 80 G | Refills: 3 | Status: SHIPPED | OUTPATIENT
Start: 2024-01-17 | End: 2024-01-17

## 2024-01-17 RX ORDER — CETIRIZINE HYDROCHLORIDE 10 MG/1
10 TABLET ORAL AT BEDTIME
Qty: 90 TABLET | Refills: 3 | Status: SHIPPED | OUTPATIENT
Start: 2024-01-17

## 2024-01-17 RX ORDER — HYDROCORTISONE 25 MG/G
OINTMENT TOPICAL 2 TIMES DAILY
Qty: 30 G | Refills: 1 | Status: SHIPPED | OUTPATIENT
Start: 2024-01-17

## 2024-01-17 SDOH — SOCIAL STABILITY - SOCIAL INSECURITY: SOCIAL EXCLUSION AND REJECTION: Z60.4

## 2024-01-17 ASSESSMENT — ENCOUNTER SYMPTOMS
CONSTIPATION: 1
NAUSEA: 1
HEARTBURN: 0
ABDOMINAL PAIN: 0
DIARRHEA: 0
PARESTHESIAS: 1
SORE THROAT: 0
FREQUENCY: 0
SHORTNESS OF BREATH: 0
BREAST MASS: 0
HEMATURIA: 0
HEADACHES: 0
NERVOUS/ANXIOUS: 0
JOINT SWELLING: 0
DIZZINESS: 0
HEMATOCHEZIA: 0
EYE PAIN: 0
COUGH: 0
PALPITATIONS: 0
MYALGIAS: 0
ARTHRALGIAS: 0
DYSURIA: 0
WEAKNESS: 0
CHILLS: 0
FEVER: 0

## 2024-01-17 NOTE — PROGRESS NOTES
Preventive Care Visit  [facility] JAREN Perales MD, Family Medicine  2024       SUBJECTIVE:   Silvino is a 43 year old, presenting for the following:  Prenatal Care        2024     9:42 AM   Additional Questions   Roomed by Melanie MONTANO     Healthy Habits:     Getting at least 3 servings of Calcium per day:  NO    Bi-annual eye exam:  NO    Dental care twice a year:  Yes    Sleep apnea or symptoms of sleep apnea:  None    Diet:  Regular (no restrictions)    Frequency of exercise:  None    Additional concerns today:  No        Social History     Tobacco Use    Smoking status: Never     Passive exposure: Never    Smokeless tobacco: Current    Tobacco comments:     Tobacco with betel nut   Substance Use Topics    Alcohol use: No             2024     9:57 AM   Alcohol Use   Prescreen: >3 drinks/day or >7 drinks/week? Not Applicable          No data to display              Reviewed orders with patient.  Reviewed health maintenance and updated orders accordingly - Yes      Breast Cancer Screenin/12/2022     9:16 AM   Breast CA Risk Assessment (FHS-7)   Do you have a family history of breast, colon, or ovarian cancer? No / Unknown           Pertinent mammograms are reviewed under the imaging tab.    History of abnormal Pap smear: NO - age 30-65 PAP every 5 years with negative HPV co-testing recommended      Latest Ref Rng & Units 2020    10:01 AM   PAP / HPV   PAP Negative for squamous intraepithelial lesion or malignancy. Negative for squamous intraepithelial lesion or malignancy  Electronically signed by Marilyn Cassidy CT (ASCP) on 2020 at  1:46 PM      HPV 16 DNA NEG Negative    HPV 18 DNA NEG Negative    Other HR HPV NEG Negative      Reviewed and updated as needed this visit by clinical staff   Tobacco  Allergies  Meds  Problems  Med Hx  Surg Hx  Fam Hx          Reviewed and updated as needed this visit by Provider   Tobacco  Allergies  Meds   "Problems  Med Hx  Surg Hx  Fam Hx              OBJECTIVE:   BP 96/66   Pulse 73   Temp 98  F (36.7  C) (Oral)   Resp 16   Ht 1.54 m (5' 0.63\")   Wt 57.3 kg (126 lb 4 oz)   LMP 12/04/2023 (Approximate)   SpO2 99%   BMI 24.15 kg/m     Estimated body mass index is 24.15 kg/m  as calculated from the following:    Height as of this encounter: 1.54 m (5' 0.63\").    Weight as of this encounter: 57.3 kg (126 lb 4 oz).  Review of Systems   Constitutional:  Negative for chills and fever.   HENT:  Negative for congestion, ear pain, hearing loss and sore throat.    Eyes:  Negative for pain and visual disturbance.   Respiratory:  Negative for cough and shortness of breath.    Cardiovascular:  Negative for chest pain and palpitations.   Gastrointestinal:  Positive for constipation and nausea. Negative for abdominal pain and diarrhea.   Genitourinary:  Negative for dysuria, frequency, genital sores, hematuria, pelvic pain, urgency, vaginal bleeding and vaginal discharge.   Musculoskeletal:  Negative for arthralgias, joint swelling and myalgias.   Skin:  Negative for rash.   Neurological:  Negative for dizziness, weakness and headaches.   Psychiatric/Behavioral:  The patient is not nervous/anxious.        Physical Exam  GENERAL: alert and no distress  NECK: no adenopathy, no asymmetry, masses, or scars  RESP: lungs clear to auscultation - no rales, rhonchi or wheezes  CV: regular rate and rhythm, normal S1 S2, no S3 or S4, no murmur, click or rub, no peripheral edema  ABDOMEN: soft, nontender, no hepatosplenomegaly, no masses and bowel sounds normal  MS: no gross musculoskeletal defects noted, no edema    Diagnostic Test Results:  Labs reviewed in Epic  No results found for this or any previous visit (from the past 24 hour(s)).  No results found for any visits on 01/17/24.    ASSESSMENT/PLAN:   Routine general medical examination at a health care facility  Up to date on health maintenance.     Severe episode of " recurrent major depressive disorder, with psychotic features (H)  Social isolation  PTSD (post-traumatic stress disorder)  Insomnia, unspecified type  Stopped her citalopram and mirtazapine a long time ago, does not think it helps. Declined anything for her sleep or mental health at this time. Denies any suicidal or homicidal ideations.     Gastroesophageal reflux disease without esophagitis  Declines any medications.     Chronic viral hepatitis B without delta agent and without coma (H)  Labs checked, needs to follow up with [facility].   - has referral, needs help making appt.     Constipation, unspecified constipation type  Declined miralax - says her constipation is no longer that bad and she does not need it.     Pruritic disorder  Dermatitis  Vaginal itching  Itching all the time, worse after her periods. No odor, just itching. Then gets slightly better. Topical only. Suspicous for BV, but symptoms come and go with her period.   - cetirizine (ZYRTEC) 10 MG tablet  Dispense: 90 tablet; Refill: 3  - hydrocortisone 2.5 % ointment  Dispense: 30 g; Refill: 1    Urinary frequency  UA bland last visit (earlier this week), has been 1-2 years of waking up at night to pee. Does not drink excessive amounts of water. Could be due to vaginal irritation. Will trial topical steroids for external itching. No signs of yeast on wet prep from 2 days ago.   - refer to urology        Counseling  Reviewed preventive health counseling, as reflected in patient instructions        She reports that she has never smoked. She has never been exposed to tobacco smoke. She uses smokeless tobacco.        Signed Electronically by: John Perales MD

## 2024-01-17 NOTE — TELEPHONE ENCOUNTER
----- Message from Yessenia Narayanan MD sent at 1/17/2024 10:04 AM CST -----  Please call w results: no urinary or vaginal infection found. Blood tests look good. I have referred to [facility] (the liver doctor) for nausea and her chronic hepatitis B, since she appears to be due for follow up there.

## 2024-01-17 NOTE — PROGRESS NOTES
"Clinic Care Coordination Contact  Community Health Worker Initial Outreach    CHW Initial Information Gathering:  Referral Source: PCP  Preferred Hospital: [facility]  [phone]  Preferred Urgent Care: [facility] [place], [phone]  Current living arrangement:: I live in a private home with family  Type of residence:: Private home - stairs  Community Resources: None  Supplies Currently Used at Home: None  Equipment Currently Used at Home: none  Informal Support system:: Family  No PCP office visit in Past Year: No  Transportation means:: Friend, Medical transport  CHW Additional Questions  If ED/Hospital discharge, follow-up appointment scheduled as recommended?: N/A  Medication changes made following ED/Hospital discharge?: N/A  MyChart active?: No  Patient agreeable to assistance with activating MyChart?: No    Patient accepts CC: No, patient does not want CCC services. Patient will be sent Care Coordination introduction letter for future reference.     Reason(s) for the referral from PCP, \"  I think patient used to have an [facility] worker? She would benefit from one with her specialty appts coming up. She also stops taking her medications a lot. I have not seen her in a year, stopped all her meds like 9 months ago   Patient stated she's taking her only one medication and does not need [facility] worker and she has her friend who can take her to future appointments. 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 CCC will no further do outreach and PCP feel free to place new referral if need(s) identify.     "

## 2024-01-17 NOTE — TELEPHONE ENCOUNTER
"Writer called patient with the help of a \"Yareli\"  regarding provider's message below. Provider message relayed to patient.    Patient verbalizes understanding, agrees with plan and has no further questions.    Closing encounter.    JASON WeemsN, RN   [facility]    "

## 2024-01-18 LAB
AFP SERPL-MCNC: 2.8 NG/ML
H PYLORI AG STL QL IA: NEGATIVE
HBV E AG SERPL QL IA: NEGATIVE

## 2024-02-02 ENCOUNTER — TRANSFERRED RECORDS (OUTPATIENT)
Dept: HEALTH INFORMATION MANAGEMENT | Facility: CLINIC | Age: 44
End: 2024-02-02
Payer: COMMERCIAL

## 2024-02-05 ENCOUNTER — PRE VISIT (OUTPATIENT)
Dept: UROLOGY | Facility: CLINIC | Age: 44
End: 2024-02-05
Payer: COMMERCIAL

## 2024-02-05 NOTE — TELEPHONE ENCOUNTER
Reason for Visit: Consult on Urinary frequency    Diagnosis: Urinary frequency    Relevant information: FP Referral by John Jimenez MD, on 01/17/2024    Records/imaging/labs/orders: All available in Epic    Pt called: No need for a call    Rooming Requirements: UA Dip/PVR/Collect Urine    Agus Greco MA  2/5/2024  12:09 PM

## 2024-02-23 NOTE — TELEPHONE ENCOUNTER
MEDICAL RECORDS REQUEST   [facility]  Urology Clinic  [address] [place] [address]  PHONE: [phone]  Fax: [phone]        FUTURE VISIT INFORMATION                                                   [place], : 1980 scheduled for future visit at [facility]    APPOINTMENT INFORMATION:  Date: 2024  Provider:  Do Arciniega MD  Reason for Visit/Diagnosis: Urinary frequency    REFERRAL INFORMATION:  Ref[facility] provider[facility]  [facility]    RECORDS REQUESTED FOR VISIT                                                     NOTES  STATUS/DETAILS   OFFICE NOTE from referring provider  yes, 2024 -- John Perales MD in [facility]   OFFICE NOTE from other specialist  yes   MEDICATION LIST  yes   LABS     URINALYSIS (UA)  yes     PRE-VISIT CHECKLIST      Joint diagnostic appointment coordinated correctly          (ensure right order & amount of time) Yes   RECORD COLLECTION COMPLETE Yes

## 2024-03-22 ENCOUNTER — PRE VISIT (OUTPATIENT)
Dept: UROLOGY | Facility: CLINIC | Age: 44
End: 2024-03-22

## 2024-04-17 PROBLEM — L29.9 ITCHING: Status: RESOLVED | Noted: 2019-12-06 | Resolved: 2024-04-17

## 2024-12-18 ENCOUNTER — PATIENT OUTREACH (OUTPATIENT)
Dept: CARE COORDINATION | Facility: CLINIC | Age: 44
End: 2024-12-18

## 2025-01-01 ENCOUNTER — PATIENT OUTREACH (OUTPATIENT)
Dept: CARE COORDINATION | Facility: CLINIC | Age: 45
End: 2025-01-01